# Patient Record
Sex: FEMALE | Race: WHITE | NOT HISPANIC OR LATINO | Employment: FULL TIME | URBAN - METROPOLITAN AREA
[De-identification: names, ages, dates, MRNs, and addresses within clinical notes are randomized per-mention and may not be internally consistent; named-entity substitution may affect disease eponyms.]

---

## 2017-03-16 ENCOUNTER — THERAPY VISIT (OUTPATIENT)
Dept: CHIROPRACTIC MEDICINE | Facility: CLINIC | Age: 47
End: 2017-03-16
Payer: COMMERCIAL

## 2017-03-16 DIAGNOSIS — M54.2 CERVICALGIA: Primary | ICD-10-CM

## 2017-03-16 DIAGNOSIS — M99.02 THORACIC SEGMENT DYSFUNCTION: ICD-10-CM

## 2017-03-16 PROCEDURE — 99203 OFFICE O/P NEW LOW 30 MIN: CPT | Mod: 25 | Performed by: CHIROPRACTOR

## 2017-03-16 PROCEDURE — 98941 CHIROPRACT MANJ 3-4 REGIONS: CPT | Mod: AT | Performed by: CHIROPRACTOR

## 2017-03-16 PROCEDURE — 97810 ACUP 1/> WO ESTIM 1ST 15 MIN: CPT | Performed by: CHIROPRACTOR

## 2017-03-16 PROCEDURE — 97110 THERAPEUTIC EXERCISES: CPT | Performed by: CHIROPRACTOR

## 2017-03-16 NOTE — PROGRESS NOTES
Chiropractic Clinic Visit    PCP: Unknown, Provider    Laya Zuleta is a 46 year old female who is seen  as a self referral presenting with chronic on/off neck pain for 5+ years. States 2 weeks ago having an increase in neck pain after exercising and lifting weights . Patient reports that the onset was gradual and does not recall any trauma or injury.  When asked, patient denies:, falling, slipping, bending and reaching or sleeping awkwardly. Patient reports she can have occasional tingling into her hands, pain radiates to the right upper trapezius area. States having frequent mild headaches.  Prior to onset, the patient was able to drive without neck pain for and sit at computer 2 hours with increasing neck. Patient notes that due to symptoms, they can only sit at computer an hour. Laya Zuleta notes   computer work 4/10 painful and prior to this incident it was 1/10.    Injury: No history trauma    Location of Pain: right lower to upper cervical spine, mid to upper thoracic spine at the following level(s) C5 , C6 , T2 , T3  and T4   Duration of Pain: 5+ years, worse over last 2 weeks  Rating of Pain at worst: 6/10  Rating of Pain Currently: 4/10  Symptoms are better with: Rest  Symptoms are worse with: weight lifting, prolonged sitting, computer work  Additional Features: denies UE radicular weakness.       Health History  as reported by the patient:    How does the patient rate their own health:   Good    Current or past medical history:   No red flags identified    Medical allergies  None    Past Traumas/Surgeries  Other:  Appendix, sinus    Family History  This patient has no significant family history    Medications:  None    Occupation:      Primary job tasks:   Computer work and Prolonged sitting    Barriers as home/work:   none    Additional health Issues:     NA      Review of Systems  Musculoskeletal: as above  Remainder of review of systems is negative including  constitutional, CV, pulmonary, GI, Skin and Neurologic except as noted in HPI or medical history.    Past Medical History   Diagnosis Date     Acne 11/29/2010     CARDIOVASCULAR SCREENING; LDL GOAL LESS THAN 160 11/29/2010     Past Surgical History   Procedure Laterality Date     Sinus surgery       1/10     Laparoscopic appendectomy child       1994       Objective  There were no vitals taken for this visit.    GENERAL APPEARANCE: healthy, alert and no distress   GAIT: NORMAL  SKIN: no suspicious lesions or rashes  NEURO: Normal strength and tone, mentation intact and speech normal  PSYCH:  mentation appears normal and affect normal/bright    Laya was asked to complete the Neck Disability Index, today in the office. NDI Disability score: 28%; pain severity scale: 4/10..    Cervical Spine Exam    Range of Motion:         Slight reduction in active and passive ROM forward flexion, extension, lateral rotation, lateral flexion. Neck pain on flexion and right rotation, lateral flexion.    Inspection:         No visible deformity        normal lordotic curvature maintained    Tender:        upper border of trapezius       scalenes    Non-Tender:        remainder of cervical spine area    Muscle strength:       C5 (shoulder abduction) symmetric 5/5       C6 (elbow flexion) symmetric 5/5       C7 (elbow extension) symmetric 5/5       C8 (finger abduction, thumb flexion) symmetric 5/5    Reflexes:        C5 (biceps) symmetric normal       C6 (supinator) symmetric normal       C7 (triceps) symmetric normal    Sensation:       grossly intact througout bilateral upper extremities    Special Tests:       Cervical compression-Neg, foraminal compression right-produced neck pain, left-Neg  Distraction - negative and Singleton Chavez - negative    Lymphatics:        no edema noted in the upper extremities       Segmental spinal dysfunction/restrictions found at:  :  C5 Right rotation restricted  C6 Right rotation restricted  T3 Right  rotation restricted  T4 Right rotation restricted.      The following soft tissue hypotonicities were observed:Traps: ache and stiff, referred pain: no  Sub-occiput: stiff, referred pain: no    Trigger points were found in:Sub-occipital and Traps    Muscle spasm found in:Sub-occipital, T-spine paraspinal and Traps      Radiology:  none    Assessment:    No diagnosis found.    RX ordered/plan of care  Anticipated outcomes  Possible risks and side effects    After discussing the risk and benefits of care, patient consented to treatment    Patient's condition:  Patient had restrictions pre-manipulation and Patient had decreased motion prior to manipulation    Treatment effectiveness:  Post manipulation there is better intersegmental movement and Patient claims to feel looser post manipulation    Plan:    Procedures:    Evaluation and Management:  83293 Moderate level exam 30 min    CMT:  17844 Chiropractic manipulative treatment 1-2 regions performed   Cervical: Diversified, C5 , C6, Supine  Thoracic: Diversified, T4, T5, Prone    Modalities:  97291: Acupuncture:  Points: for neck and upper back pain-patient prone 15 min    Therapeutic procedures:  Stretches - Pictures and instructions for home exercise program as well as in-office session of a minimum of 8 minutes of the stretching was done today.   Wall stretch, pectoralis stretch, cat/cow, scapular retraction    Response to Treatment  Patient tolerated the treatment well today.    Prognosis: Good      Treatment plan and goals:  Goals:  DRIVE: the patient specific goal is to attain pre-inury status of driving for  2 hours comfortably  USING A COMPUTER: the patient specific goals is to attain his pre-injury status of 2 hours    Frequency of care  Duration of care is estimated to be 6 weeks, from the initial treatment.  It is estimated that the patient will need a total of 3-5 visits to resolve this episode.  For the initial therapeutic trial of care, the frequency is  recommended at 2-3 X a month, once daily.  A reevaluation would be clinically appropriate in 6 visits, to determine progress and further course of care.    In-Office Treatment  Evaluation  Spinal Chiropractic Manipulative Therapy:  C5, C6, T4, T5  Acupuncture:  For chronic neck and upper to mid-back pain-patient prone-gown, 15 min prone.  Therapeutic Exercises: cat/cow, pectoralis stretch, wall-posture stretch, scapular retraction      Recommendations:    Instructions:mindful of good posture    Follow-up:  Return to care in 1-2 weeks.     Disclaimer: This note consists of symbols derived from keyboarding, dictation and/or voice recognition software. As a result, there may be errors in the script that have gone undetected. Please consider this when interpreting information found in this chart.

## 2017-03-31 ENCOUNTER — THERAPY VISIT (OUTPATIENT)
Dept: CHIROPRACTIC MEDICINE | Facility: CLINIC | Age: 47
End: 2017-03-31
Payer: COMMERCIAL

## 2017-03-31 DIAGNOSIS — M99.02 THORACIC SEGMENT DYSFUNCTION: ICD-10-CM

## 2017-03-31 DIAGNOSIS — M99.03 SOMATIC DYSFUNCTION OF LUMBAR REGION: ICD-10-CM

## 2017-03-31 DIAGNOSIS — M54.2 CERVICALGIA: Primary | ICD-10-CM

## 2017-03-31 PROCEDURE — 98941 CHIROPRACT MANJ 3-4 REGIONS: CPT | Mod: AT | Performed by: CHIROPRACTOR

## 2017-03-31 PROCEDURE — 97810 ACUP 1/> WO ESTIM 1ST 15 MIN: CPT | Performed by: CHIROPRACTOR

## 2017-03-31 NOTE — PROGRESS NOTES
Visit #:  2 of 6 based on treatment plan 4-6    Subjective:  Laya Zuleta is a 46 year old female who is seen in f/u up for:     Data Unavailable.     Since last visit on 3/16/2017,  Laya Zuleta reports the following changes: Pain immediately after last treatment: 4/10 and their pain level today 4/10. Sitting rated at a 4/10 painful, difficult and prior to initial visit 5/10 and prior to this incident it was 0/10. Patient returned from traveling and states sleeping in a different bed and the flight has made her more stiff/achy in the last 1-2 days. Lower back is achy/sore today after her flight.     Area of chief complaint:  Cervical and Thoracic :  Symptoms are graded at 4/10. The quality is described as stiff, achey, dull.  Motion has remained about the same, no improvement. Patient feels that they have not improved and feel the same.     Since last visit the patient feels that they are 0-10 percent  improved from last visit.       Objective:  The following was observed:    P: pain elicited on palpation, C1, C6, T5    A: static palpation demonstrates intersegmental asymmetry , C0, C1, C6, T4    R: motion palpation notes restricted motion    T: muscle spasm at level(s): Lumbar erector spine, Sub-occipital, T-spine paraspinal and Traps Bilaterally      Assessment:    Segmental spinal dysfunction/restrictions found at:  Occiput  C1   C5   C6   T5  T6  L5  Sacrum    Diagnoses:    No diagnosis found.    Patient's condition:  Patient had restrictions pre-manipulation and Patient had decreased motion prior to manipulation    Treatment effectiveness:  Post manipulation there is better intersegmental movement and Patient claims to feel looser post manipulation      Procedures:  CMT:  84428 Chiropractic manipulative treatment 3-4 regions performed   Cervical: Diversified, C1 , C6, Supine  Thoracic: Diversified, T5, Prone  Lumbar: Diversified, L1, L2, Prone    Modalities:  15301: Acupuncture, for 15 minutes:   Points: for neck pain, upper back, and lower back pts-patient prone15 min    Therapeutic procedures:  None      Prognosis: Good    Progress towards Goals: Patient has not made progress towards goal of DRIVE: the patient specific goal is to attain pre-inury status of driving for  2 hours comfortably  USING A COMPUTER: the patient specific goals is to attain his pre-injury status of 2 hours.     Response to Treatment:   Patient tolerated the treatment well today.      Recommendations:    Instructions:ice 20 minutes every other hour as needed    Follow-up:  Return to care in 1-2 weeks.

## 2017-04-21 ENCOUNTER — OFFICE VISIT (OUTPATIENT)
Dept: PEDIATRICS | Facility: CLINIC | Age: 47
End: 2017-04-21
Payer: COMMERCIAL

## 2017-04-21 VITALS
OXYGEN SATURATION: 98 % | TEMPERATURE: 98.6 F | SYSTOLIC BLOOD PRESSURE: 120 MMHG | DIASTOLIC BLOOD PRESSURE: 68 MMHG | HEIGHT: 68 IN | HEART RATE: 72 BPM | WEIGHT: 181.1 LBS | BODY MASS INDEX: 27.45 KG/M2

## 2017-04-21 DIAGNOSIS — M25.522 LEFT ELBOW PAIN: ICD-10-CM

## 2017-04-21 DIAGNOSIS — Z01.818 PREOP GENERAL PHYSICAL EXAM: Primary | ICD-10-CM

## 2017-04-21 DIAGNOSIS — Z23 NEED FOR VACCINATION: ICD-10-CM

## 2017-04-21 LAB — BETA HCG QUAL IFA URINE: NEGATIVE

## 2017-04-21 PROCEDURE — 99214 OFFICE O/P EST MOD 30 MIN: CPT | Mod: 25 | Performed by: NURSE PRACTITIONER

## 2017-04-21 PROCEDURE — 90715 TDAP VACCINE 7 YRS/> IM: CPT | Performed by: NURSE PRACTITIONER

## 2017-04-21 PROCEDURE — 84703 CHORIONIC GONADOTROPIN ASSAY: CPT | Performed by: NURSE PRACTITIONER

## 2017-04-21 PROCEDURE — 90471 IMMUNIZATION ADMIN: CPT | Performed by: NURSE PRACTITIONER

## 2017-04-21 NOTE — MR AVS SNAPSHOT
After Visit Summary   4/21/2017    Laya Zuleta    MRN: [id number]           Patient Information     Date Of Birth          1970        Visit Information        Provider Department      4/21/2017 10:00 AM Suzanna Smith APRN CNP [facility]        Today's Diagnoses     Preop general physical exam    -  1    Need for vaccination        Left elbow pain          Care Instructions      Before Your Surgery      Call your surgeon if there is any change in your health. This includes signs of a cold or flu (such as a sore throat, runny nose, cough, rash or fever).    Do not smoke, drink alcohol or take over the counter medicine (unless your surgeon or primary care doctor tells you to) for the 24 hours before and after surgery.    If you take prescribed drugs: Follow your doctor s orders about which medicines to take and which to stop until after surgery.    Eating and drinking prior to surgery: follow the instructions from your surgeon    Take a shower or bath the night before surgery. Use the soap your surgeon gave you to gently clean your skin. If you do not have soap from your surgeon, use your regular soap. Do not shave or scrub the surgery site.  Wear clean pajamas and have clean sheets on your bed.         Follow-ups after your visit        Who to contact     If you have questions or need follow up information about today's clinic visit or your schedule please contact [facility] directly at [phone].  Normal or non-critical lab and imaging results will be communicated to you by MyChart, letter or phone within 4 business days after the clinic has received the results. If you do not hear from us within 7 days, please contact the clinic through MyChart or phone. If you have a critical or abnormal lab result, we will notify you by phone as soon as possible.  Submit refill requests through [employer] or call your pharmacy and they will forward the refill request to  "us. Please allow 3 business days for your refill to be completed.          Additional Information About Your Visit        [employer] Information     [employer] gives you secure access to your electronic health record. If you see a primary care provider, you can also send messages to your care team and make appointments. If you have questions, please call your primary care clinic.  If you do not have a primary care provider, please call [phone] and they will assist you.        Care EveryWhere ID     This is your Care EveryWhere ID. This could be used by other organizations to access your [facility] medical records  [id number]        Your Vitals Were     Pulse Temperature Height Pulse Oximetry BMI (Body Mass Index)       72 98.6  F (37  C) (Tympanic) 5' 7.75\" (1.721 m) 98% 27.74 kg/m2        Blood Pressure from Last 3 Encounters:   04/21/17 120/68   06/23/16 108/80   02/17/16 112/72    Weight from Last 3 Encounters:   04/21/17 181 lb 1.6 oz (82.1 kg)   06/23/16 173 lb 6.4 oz (78.7 kg)   02/17/16 180 lb 9.6 oz (81.9 kg)              We Performed the Following     ADMIN 1st VACCINE     Beta HCG qual IFA urine     TDAP VACCINE (ADACEL)          Today's Medication Changes          These changes are accurate as of: 4/21/17 11:35 AM.  If you have any questions, ask your nurse or doctor.               Start taking these medicines.        Dose/Directions    order for DME   Used for:  Left elbow pain   Started by:  Suzanna Smith APRN CNP        Equipment being ordered: tennis elbow brace   Quantity:  1 each   Refills:  0            Where to get your medicines      Some of these will need a paper prescription and others can be bought over the counter.  Ask your nurse if you have questions.     Bring a paper prescription for each of these medications     order for DME                Primary Care Provider Office Phone # Fax #    FLORENCE Zhu [phone]       [facility] [address] [place] [address]        Thank you!     Thank you for choosing [facility]  for your care. Our goal is always to provide you with excellent care. Hearing back from our patients is one way we can continue to improve our services. Please take a few minutes to complete the written survey that you may receive in the mail after your visit with us. Thank you!             Your Updated Medication List - Protect others around you: Learn how to safely use, store and throw away your medicines at www.disposemymeds.org.          This list is accurate as of: 4/21/17 11:35 AM.  Always use your most recent med list.                   Brand Name Dispense Instructions for use    buPROPion 150 MG 24 hr tablet    WELLBUTRIN XL    90 tablet    Take 1 tablet (150 mg) by mouth every morning       calcium 600 + D 600-400 MG-UNIT per tablet   Generic drug:  calcium-vitamin D     100 tablet    Take 1 tablet by mouth 2 times daily.       citalopram 10 MG tablet    celeXA    90 tablet    Take 1 tablet (10 mg) by mouth daily       ibuprofen 400 MG tablet    ADVIL/MOTRIN    120 tablet    Take 800 mg by mouth every 6 hours as needed for moderate pain Reported on 4/21/2017       order for DME     1 each    Equipment being ordered: tennis elbow brace       SPIRONOLACTONE PO      Take 75 mg by mouth

## 2017-04-21 NOTE — NURSING NOTE
"Chief Complaint   Patient presents with     Pre-Op Exam     cataract surg       Initial /68 (Cuff Size: Adult Regular)  Pulse 72  Temp 98.6  F (37  C) (Tympanic)  Ht 5' 7.75\" (1.721 m)  Wt 181 lb 1.6 oz (82.1 kg)  SpO2 98%  BMI 27.74 kg/m2 Estimated body mass index is 27.74 kg/(m^2) as calculated from the following:    Height as of this encounter: 5' 7.75\" (1.721 m).    Weight as of this encounter: 181 lb 1.6 oz (82.1 kg).  Medication Reconciliation: complete   Alexa Schwarz CMA    "

## 2017-04-21 NOTE — PROGRESS NOTES
[facility]  [address]  [address]  [place] [address] [phone]  Dept: [phone]    PRE-OP EVALUATION:  Today's date: 2017    Laya Zuleta (: 1970) presents for pre-operative evaluation assessment as requested by Dr. Tonio Del Rio.  She requires evaluation and anesthesia risk assessment prior to undergoing surgery/procedure for treatment of cataracts .  Proposed procedure: cataract removal -right.    Date of Surgery/ Procedure: 17  Time of Surgery/ Procedure: [facility]  Hospital/Surgical Facility: [facility]   Fax number for surgical facility: [phone]  Primary Physician: Suzanna Smith  Type of Anesthesia Anticipated: to be determined    Patient has a Health Care Directive or Living Will:  YES - not on file with [facility]    Preop Questions 2017   1.  Do you have a history of heart attack, stroke, stent, bypass or surgery on an artery in the head, neck, heart or legs? No   2.  Do you ever have any pain or discomfort in your chest? No   3.  Do you have a history of  Heart Failure? No   4.   Are you troubled by shortness of breath when:  walking on a level surface, or up a slight hill, or at night? No   5.  Do you currently have a cold, bronchitis or other respiratory infection? No   6.  Do you have a cough, shortness of breath, or wheezing? No   7.  Do you sometimes get pains in the calves of your legs when you walk? No   8. Do you or anyone in your family have previous history of blood clots? No   9.  Do you or does anyone in your family have a serious bleeding problem such as prolonged bleeding following surgeries or cuts? No   10. Have you ever had problems with anemia or been told to take iron pills? No   11. Have you had any abnormal blood loss such as black, tarry or bloody stools, or abnormal vaginal bleeding? No   12. Have you ever had a blood transfusion? No   13. Have you or any of your relatives ever had problems  with anesthesia? No   14. Do you have sleep apnea, excessive snoring or daytime drowsiness? No   15. Do you have any prosthetic heart valves? No   16. Do you have prosthetic joints? No   17. Is there any chance that you may be pregnant? No         HPI:                                                      Brief HPI related to upcoming procedure: Cataract    Also reports unknown duration of discomfort to left elbow. Has been doing exercises at home that she researched on the internet with mild improvement. Believes it is related to overuse secondary to consistent left-handed computer mouse use. Patient is a  and uses computer mouse all day with left hand. She trained herself to use mouse left-handed because of carpal tunnel in her right extremity.     See problem list for active medical problems.  Problems all longstanding and stable, except as noted/documented.  See ROS for pertinent symptoms related to these conditions.                                                                                                  .    MEDICAL HISTORY:                                                      Patient Active Problem List    Diagnosis Date Noted     Family history of breast cancer 04/03/2015     Priority: Medium     CARDIOVASCULAR SCREENING; LDL GOAL LESS THAN 160 11/29/2010     Priority: Medium     Chronic maxillary sinusitis 11/29/2010     Priority: Medium     Acne 11/29/2010     Priority: Medium      Past Medical History:   Diagnosis Date     Acne 11/29/2010     CARDIOVASCULAR SCREENING; LDL GOAL LESS THAN 160 11/29/2010     Past Surgical History:   Procedure Laterality Date     LAPAROSCOPIC APPENDECTOMY CHILD      1994     SINUS SURGERY      1/10     Current Outpatient Prescriptions   Medication Sig Dispense Refill     buPROPion (WELLBUTRIN XL) 150 MG 24 hr tablet Take 1 tablet (150 mg) by mouth every morning 90 tablet 3     citalopram (CELEXA) 10 MG tablet Take 1 tablet (10 mg) by mouth daily 90  "tablet 3     SPIRONOLACTONE PO Take 75 mg by mouth       Calcium Carbonate-Vitamin D (CALCIUM 600 + D) 600-400 MG-UNIT tablet Take 1 tablet by mouth 2 times daily. 100 tablet 3     ibuprofen (ADVIL,MOTRIN) 400 MG tablet Take 800 mg by mouth every 6 hours as needed for moderate pain Reported on 4/21/2017 120 tablet      OTC products: None, except as noted above and herbals and vitamins - Biotin    No Known Allergies   Latex Allergy: NO    Social History   Substance Use Topics     Smoking status: Never Smoker     Smokeless tobacco: Never Used     Alcohol use Yes      Comment: 2 glasses of wine weekly     History   Drug Use No       REVIEW OF SYSTEMS:                                                    C: NEGATIVE for fever, chills, change in weight  I: NEGATIVE for worrisome rashes, moles or lesions  E: NEGATIVE for vision changes or irritation other than affected eye.  E/M: NEGATIVE mouth and throat problems. Recent fullness and itchiness felt in right ear.  R: NEGATIVE for significant cough or SOB  CV: NEGATIVE for chest pain, palpitations or peripheral edema  GI: NEGATIVE for nausea, abdominal pain, heartburn, or change in bowel habits  : NEGATIVE for frequency, dysuria, or hematuria  M: NEGATIVE for significant arthralgias or myalgia  N: NEGATIVE for weakness, dizziness or paresthesias  E: NEGATIVE for temperature intolerance, skin/hair changes  H: NEGATIVE for bleeding problems  P: NEGATIVE for changes in mood or affect    EXAM:                                                    /68 (Cuff Size: Adult Regular)  Pulse 72  Temp 98.6  F (37  C) (Tympanic)  Ht 5' 7.75\" (1.721 m)  Wt 181 lb 1.6 oz (82.1 kg)  SpO2 98%  BMI 27.74 kg/m2  GENERAL APPEARANCE: healthy, alert and no distress  HENT: ear canals and TM's normal and nose and mouth without ulcers or lesions  RESP: lungs clear to auscultation - no rales, rhonchi or wheezes  CV: regular rate and rhythm, normal S1 S2, no S3 or S4 and no murmur, click or " rub   ABDOMEN: soft, nontender, no HSM or masses and bowel sounds normal  MSK: full ROM noted to left elbow. Tenderness on palpation to left lateral epicondyle and extensor carpi radialis brevis tendon.   NEURO: Normal strength and tone, sensory exam grossly normal, mentation intact and speech normal    DIAGNOSTICS:                                                    No labs or EKG required for low risk surgery (cataract, skin procedure, breast biopsy, etc)    Recent Labs   Lab Test  02/04/16   1118  05/13/13   0916  01/15/13   1500   HGB  14.8   --    --    POTASSIUM   --   4.4  4.1        IMPRESSION:                                                    Reason for surgery/procedure: Cataract    The proposed surgical procedure is considered LOW risk.    REVISED CARDIAC RISK INDEX  The patient has the following serious cardiovascular risks for perioperative complications such as (MI, PE, VFib and 3  AV Block):  No serious cardiac risks    The patient has the following additional risks for perioperative complications:  No identified additional risks      ICD-10-CM    1. Preop general physical exam Z01.818    2. Need for vaccination Z23 TDAP VACCINE (ADACEL)     ADMIN 1st VACCINE       RECOMMENDATIONS:                                                      (Z01.818) Preop general physical exam  (primary encounter diagnosis)  Comment: Patient is to take all scheduled medications on the day of surgery EXCEPT for modifications listed below. APPROVAL GIVEN to proceed with proposed procedure, without further diagnostic evaluation  Plan:        - Beta HCG qual IFA urine, negative            (Z23) Need for vaccination  Comment: routine  Plan:        - TDAP VACCINE (ADACEL), ADMIN 1st VACCINE         (M25.522) Left elbow pain  Comment: reports ongoing left elbow pain with an unknown duration. This is likely a result of overuse from consistently using computer mouse with left hand as . Has tried home exercises  found on internet with mild effect. Consider history and physical exam, I suspect this is tennis elbow and recommend wearing a brace consistently for a few weeks. If no improvement, will consider referral to PT. Educated patient on some helpful home exercises and strengthening she can try.  Plan:        - order for DME       - Strengthening exercises at home.       - Wear brace 10 hrs/day, may remove at night.    Follow-up: return to clinic if symptoms fail to improve or worsen with above treatment plan.     Su De La O RN, FNP-DNP Student  [facility]       Signed Electronically by: FLORENCE Zhu CNP    Copy of this evaluation report is provided to requesting physician.    [facility] Preop Guidelines

## 2017-06-14 DIAGNOSIS — F32.81 PMDD (PREMENSTRUAL DYSPHORIC DISORDER): ICD-10-CM

## 2017-06-14 NOTE — TELEPHONE ENCOUNTER
CITALOPRAM 10MG     Last Written Prescription Date: 6/23/2016  Last Fill Quantity: 90, # refills: 3  Last Office Visit with G primary care provider:  4/21/2017        Last PHQ-9 score on record= No flowsheet data found.

## 2017-06-16 RX ORDER — CITALOPRAM HYDROBROMIDE 10 MG/1
10 TABLET ORAL DAILY
Qty: 90 TABLET | Refills: 0 | Status: SHIPPED | OUTPATIENT
Start: 2017-06-16 | End: 2017-07-13

## 2017-06-16 NOTE — TELEPHONE ENCOUNTER
Medication is being filled for 1 time refill only due to:  Patient needs to be seen because due for annual physical.       Prescription approved per [facility] Refill Protocol.    Lary MARQUEZ RN, BSN, PHN  [facility] RN

## 2017-06-16 NOTE — TELEPHONE ENCOUNTER
LM on VM to call back. Needs to schedule for annual physical.     Lary MARQUEZ RN, BSN, PHN  [facility] RN

## 2017-07-03 DIAGNOSIS — F32.81 PMDD (PREMENSTRUAL DYSPHORIC DISORDER): ICD-10-CM

## 2017-07-03 NOTE — TELEPHONE ENCOUNTER
buPROPion (WELLBUTRIN XL) 150 MG 24 hr tablet       Last Written Prescription Date: 6/23/2016  Last Fill Quantity: 90; # refills: 3  Last Office Visit with [facility], [facility] or [facility] prescribing provider:  4/21/2017   Next 5 appointments (look out 90 days)     Jul 13, 2017 11:20 AM CDT   PHYSICAL with FLORENCE Zhu CNP   [facility] ([facility]    [address] [place] [address]  [address] [place] [id number] [phone]                   Last PHQ-9 score on record= No flowsheet data found.    No results found for: AST  No results found for: ALT

## 2017-07-05 RX ORDER — BUPROPION HYDROCHLORIDE 150 MG/1
150 TABLET ORAL EVERY MORNING
Qty: 30 TABLET | Refills: 0 | Status: SHIPPED | OUTPATIENT
Start: 2017-07-05 | End: 2017-07-13

## 2017-07-05 NOTE — TELEPHONE ENCOUNTER
Routing refill request to provider for review/approval because:  R/t diagnosis and no phq9 on file

## 2017-07-13 ENCOUNTER — OFFICE VISIT (OUTPATIENT)
Dept: PEDIATRICS | Facility: CLINIC | Age: 47
End: 2017-07-13
Payer: COMMERCIAL

## 2017-07-13 VITALS
DIASTOLIC BLOOD PRESSURE: 62 MMHG | TEMPERATURE: 97.6 F | SYSTOLIC BLOOD PRESSURE: 102 MMHG | BODY MASS INDEX: 27.89 KG/M2 | OXYGEN SATURATION: 99 % | HEIGHT: 68 IN | HEART RATE: 79 BPM | WEIGHT: 184 LBS

## 2017-07-13 DIAGNOSIS — Z00.00 ROUTINE HEALTH MAINTENANCE: Primary | ICD-10-CM

## 2017-07-13 DIAGNOSIS — F32.81 PMDD (PREMENSTRUAL DYSPHORIC DISORDER): ICD-10-CM

## 2017-07-13 PROCEDURE — 99396 PREV VISIT EST AGE 40-64: CPT | Performed by: NURSE PRACTITIONER

## 2017-07-13 PROCEDURE — G0145 SCR C/V CYTO,THINLAYER,RESCR: HCPCS | Performed by: NURSE PRACTITIONER

## 2017-07-13 PROCEDURE — 87624 HPV HI-RISK TYP POOLED RSLT: CPT | Performed by: NURSE PRACTITIONER

## 2017-07-13 RX ORDER — BUPROPION HYDROCHLORIDE 150 MG/1
150 TABLET ORAL EVERY MORNING
Qty: 90 TABLET | Refills: 3 | Status: SHIPPED | OUTPATIENT
Start: 2017-07-13 | End: 2018-08-28

## 2017-07-13 RX ORDER — CITALOPRAM HYDROBROMIDE 10 MG/1
10 TABLET ORAL DAILY
Qty: 90 TABLET | Refills: 3 | Status: SHIPPED | OUTPATIENT
Start: 2017-07-13 | End: 2018-08-25

## 2017-07-13 NOTE — NURSING NOTE
"Chief Complaint   Patient presents with     Physical       Initial /62 (Cuff Size: Adult Large)  Pulse 79  Temp 97.6  F (36.4  C) (Tympanic)  Ht 5' 7.75\" (1.721 m)  Wt 184 lb (83.5 kg)  SpO2 99%  BMI 28.18 kg/m2 Estimated body mass index is 28.18 kg/(m^2) as calculated from the following:    Height as of this encounter: 5' 7.75\" (1.721 m).    Weight as of this encounter: 184 lb (83.5 kg).  Medication Reconciliation: complete   Alexa Schwarz CMA    "

## 2017-07-13 NOTE — PATIENT INSTRUCTIONS
-Mammogram  -Fasting lab appointment.         Preventive Health Recommendations  Female Ages 40 to 49    Yearly exam:     See your health care provider every year in order to  1. Review health changes.   2. Discuss preventive care.    3. Review your medicines if your doctor prescribed any.      Get a Pap test every three years (unless you have an abnormal result and your provider advises testing more often).      If you get Pap tests with HPV test, you only need to test every 5 years, unless you have an abnormal result. You do not need a Pap test if your uterus was removed (hysterectomy) and you have not had cancer.      You should be tested each year for STDs (sexually transmitted diseases), if you're at risk.       Ask your doctor if you should have a mammogram.      Have a colonoscopy (test for colon cancer) if someone in your family has had colon cancer or polyps before age 50.       Have a cholesterol test every 5 years.       Have a diabetes test (fasting glucose) after age 45. If you are at risk for diabetes, you should have this test every 3 years.    Shots: Get a flu shot each year. Get a tetanus shot every 10 years.     Nutrition:     Eat at least 5 servings of fruits and vegetables each day.    Eat whole-grain bread, whole-wheat pasta and brown rice instead of white grains and rice.    Talk to your provider about Calcium and Vitamin D.     Lifestyle    Exercise at least 150 minutes a week (an average of 30 minutes a day, 5 days a week). This will help you control your weight and prevent disease.    Limit alcohol to one drink per day.    No smoking.     Wear sunscreen to prevent skin cancer.    See your dentist every six months for an exam and cleaning.

## 2017-07-13 NOTE — MR AVS SNAPSHOT
After Visit Summary   7/13/2017    Laya Zuleta    MRN: [id number]           Patient Information     Date Of Birth          1970        Visit Information        Provider Department      7/13/2017 11:20 AM Suzanna Smith APRN [facility] [place]        Today's Diagnoses     Routine health maintenance    -  1    PMDD (premenstrual dysphoric disorder)          Care Instructions    -Mammogram  -Fasting lab appointment.         Preventive Health Recommendations  Female Ages 40 to 49    Yearly exam:     See your health care provider every year in order to  1. Review health changes.   2. Discuss preventive care.    3. Review your medicines if your doctor prescribed any.      Get a Pap test every three years (unless you have an abnormal result and your provider advises testing more often).      If you get Pap tests with HPV test, you only need to test every 5 years, unless you have an abnormal result. You do not need a Pap test if your uterus was removed (hysterectomy) and you have not had cancer.      You should be tested each year for STDs (sexually transmitted diseases), if you're at risk.       Ask your doctor if you should have a mammogram.      Have a colonoscopy (test for colon cancer) if someone in your family has had colon cancer or polyps before age 50.       Have a cholesterol test every 5 years.       Have a diabetes test (fasting glucose) after age 45. If you are at risk for diabetes, you should have this test every 3 years.    Shots: Get a flu shot each year. Get a tetanus shot every 10 years.     Nutrition:     Eat at least 5 servings of fruits and vegetables each day.    Eat whole-grain bread, whole-wheat pasta and brown rice instead of white grains and rice.    Talk to your provider about Calcium and Vitamin D.     Lifestyle    Exercise at least 150 minutes a week (an average of 30 minutes a day, 5 days a week). This will help you control your weight and prevent  "disease.    Limit alcohol to one drink per day.    No smoking.     Wear sunscreen to prevent skin cancer.    See your dentist every six months for an exam and cleaning.          Follow-ups after your visit        Future tests that were ordered for you today     Open Future Orders        Priority Expected Expires Ordered    *MA Screening Digital Bilateral Routine  7/13/2018 7/13/2017    GLUCOSE Routine  7/13/2018 7/13/2017    Lipid panel reflex to direct LDL Routine  7/13/2018 7/13/2017            Who to contact     If you have questions or need follow up information about today's clinic visit or your schedule please contact [facility] directly at [phone].  Normal or non-critical lab and imaging results will be communicated to you by Casual Stepshart, letter or phone within 4 business days after the clinic has received the results. If you do not hear from us within 7 days, please contact the clinic through Zave Networkst or phone. If you have a critical or abnormal lab result, we will notify you by phone as soon as possible.  Submit refill requests through GroundMetrics or call your pharmacy and they will forward the refill request to us. Please allow 3 business days for your refill to be completed.          Additional Information About Your Visit        Casual StepsharMeeVee Information     GroundMetrics gives you secure access to your electronic health record. If you see a primary care provider, you can also send messages to your care team and make appointments. If you have questions, please call your primary care clinic.  If you do not have a primary care provider, please call [phone] and they will assist you.        Care EveryWhere ID     This is your Care EveryWhere ID. This could be used by other organizations to access your [facility] medical records  [id number]        Your Vitals Were     Pulse Temperature Height Pulse Oximetry BMI (Body Mass Index)       79 97.6  F (36.4  C) (Tympanic) 5' 7.75\" (1.721 m) 99% 28.18 kg/m2        " Blood Pressure from Last 3 Encounters:   07/13/17 102/62   04/21/17 120/68   06/23/16 108/80    Weight from Last 3 Encounters:   07/13/17 184 lb (83.5 kg)   04/21/17 181 lb 1.6 oz (82.1 kg)   06/23/16 173 lb 6.4 oz (78.7 kg)              We Performed the Following     HPV High Risk Types DNA Cervical     Pap imaged thin layer screen with HPV - recommended age 30 - 65          Where to get your medicines      These medications were sent to [facility] - [place], [place] - [address]  [address], [facility] [address]     Phone:  [phone]     buPROPion 150 MG 24 hr tablet    citalopram 10 MG tablet          Primary Care Provider Office Phone # Fax #    FLORENCE Zhu [facility] [phone]       [facility] [address] [place] [address] [address] [place] [address]        Equal Access to Services     RICH SPENCER : Hadii ashley ku hadasho Soomaali, waaxda luqadaha, qaybta kaalmada adeegyada, waxay beccain haycecilia aburto . So [facility] [phone].    ATENCIÓN: Si habla español, tiene a chambers disposición servicios gratuitos de asistencia lingüística. Llame al [phone].    We comply with applicable federal civil rights laws and [place] laws. We do not discriminate on the basis of race, color, national origin, age, disability sex, sexual orientation or gender identity.            Thank you!     Thank you for choosing [facility]  for your care. Our goal is always to provide you with excellent care. Hearing back from our patients is one way we can continue to improve our services. Please take a few minutes to complete the written survey that you may receive in the mail after your visit with us. Thank you!             Your Updated Medication List - Protect others around you: Learn how to safely use, store and throw away your medicines at www.disposemymeds.org.          This list is accurate as of: 7/13/17 11:49 AM.  Always use your most recent med  list.                   Brand Name Dispense Instructions for use Diagnosis    buPROPion 150 MG 24 hr tablet    WELLBUTRIN XL    90 tablet    Take 1 tablet (150 mg) by mouth every morning    PMDD (premenstrual dysphoric disorder)       calcium 600 + D 600-400 MG-UNIT per tablet   Generic drug:  calcium-vitamin D     100 tablet    Take 1 tablet by mouth 2 times daily.        citalopram 10 MG tablet    celeXA    90 tablet    Take 1 tablet (10 mg) by mouth daily    PMDD (premenstrual dysphoric disorder)       ibuprofen 400 MG tablet    ADVIL/MOTRIN    120 tablet    Take 800 mg by mouth every 6 hours as needed for moderate pain Reported on 4/21/2017        order for DME     1 each    Equipment being ordered: tennis elbow brace    Left elbow pain       SPIRONOLACTONE PO      Take 75 mg by mouth

## 2017-07-13 NOTE — PROGRESS NOTES
SUBJECTIVE:   CC: Laya Zuleta is an 47 year old woman who presents for preventive health visit.     Physical   Annual:     Getting at least 3 servings of Calcium per day::  Yes    Bi-annual eye exam::  Yes    Dental care twice a year::  Yes    Sleep apnea or symptoms of sleep apnea::  None    Diet::  Regular (no restrictions)    Frequency of exercise::  2-3 days/week    Duration of exercise::  30-45 minutes    Taking medications regularly::  Yes    Medication side effects::  None    Additional concerns today::  No    Concerns today: med review of Celexa, Wellbutrin  Has not had a period since last summer    -------------------------------------    Today's PHQ-2 Score:   PHQ-2 ( 1999 Pfizer) 7/13/2017   Q1: Little interest or pleasure in doing things Not at all   Q2: Feeling down, depressed or hopeless Not at all   PHQ-2 Score 0       Abuse: Current or Past(Physical, Sexual or Emotional)- No  Do you feel safe in your environment - Yes    Social History   Substance Use Topics     Smoking status: Never Smoker     Smokeless tobacco: Never Used     Alcohol use Yes      Comment: 2 times per week, 2 per time     The patient does not drink >3 drinks per day nor >7 drinks per week.    Reviewed orders with patient.  Reviewed health maintenance and updated orders accordingly - Yes  Labs reviewed in EPIC    Patient under age 50, mutual decision reflected in health maintenance.      Pertinent mammograms are reviewed under the imaging tab.  History of abnormal Pap smear: NO - age 30-65 PAP every 5 years with negative HPV co-testing recommended    Reviewed and updated as needed this visit by clinical staff  Tobacco  Allergies  Meds  Med Hx  Surg Hx  Fam Hx  Soc Hx        Reviewed and updated as needed this visit by Provider            ROS:  C: NEGATIVE for fever, chills, change in weight  I: NEGATIVE for worrisome rashes, moles or lesions  E: NEGATIVE for vision changes or irritation  ENT: NEGATIVE for ear, mouth  "and throat problems  R: NEGATIVE for significant cough or SOB  B: NEGATIVE for masses, tenderness or discharge  CV: NEGATIVE for chest pain, palpitations or peripheral edema  GI: NEGATIVE for nausea, abdominal pain, heartburn, or change in bowel habits   female: Periods are irregular. No period since last August.   M: NEGATIVE for significant arthralgias or myalgia  N: NEGATIVE for weakness, dizziness or paresthesias  P: NEGATIVE for changes in mood or affect     OBJECTIVE:   /62 (Cuff Size: Adult Large)  Pulse 79  Temp 97.6  F (36.4  C) (Tympanic)  Ht 5' 7.75\" (1.721 m)  Wt 184 lb (83.5 kg)  SpO2 99%  BMI 28.18 kg/m2  EXAM:  GENERAL APPEARANCE: healthy, alert and no distress  EYES: Eyes grossly normal to inspection, PERRL and conjunctivae and sclerae normal  HENT: ear canals and TM's normal, nose and mouth without ulcers or lesions, oropharynx clear and oral mucous membranes moist  NECK: no adenopathy, no asymmetry, masses, or scars and thyroid normal to palpation  RESP: lungs clear to auscultation - no rales, rhonchi or wheezes  BREAST: normal without masses, tenderness or nipple discharge and no palpable axillary masses or adenopathy  CV: regular rate and rhythm, normal S1 S2, no S3 or S4, no murmur, click or rub, no peripheral edema and peripheral pulses strong  ABDOMEN: soft, nontender, no hepatosplenomegaly, no masses and bowel sounds normal   (female): normal female external genitalia, normal urethral meatus, vaginal mucosal atrophy noted, normal cervix, adnexae, and uterus without masses or abnormal discharge  MS: no musculoskeletal defects are noted and gait is age appropriate without ataxia  SKIN: no suspicious lesions or rashes  NEURO: Normal strength and tone, sensory exam grossly normal, mentation intact and speech normal  PSYCH: mentation appears normal and affect normal/bright    ASSESSMENT/PLAN:   1. Routine health maintenance  - GLUCOSE; Future  - Pap imaged thin layer screen with " "HPV - recommended age 30 - 65  - HPV High Risk Types DNA Cervical  - Lipid panel reflex to direct LDL; Future  - *MA Screening Digital Bilateral; Future    2. PMDD (premenstrual dysphoric disorder)  - buPROPion (WELLBUTRIN XL) 150 MG 24 hr tablet; Take 1 tablet (150 mg) by mouth every morning  Dispense: 90 tablet; Refill: 3  - citalopram (CELEXA) 10 MG tablet; Take 1 tablet (10 mg) by mouth daily  Dispense: 90 tablet; Refill: 3    COUNSELING:  Reviewed preventive health counseling, as reflected in patient instructions       reports that she has never smoked. She has never used smokeless tobacco.    Estimated body mass index is 28.18 kg/(m^2) as calculated from the following:    Height as of this encounter: 5' 7.75\" (1.721 m).    Weight as of this encounter: 184 lb (83.5 kg).   Weight management plan: Discussed healthy diet and exercise guidelines and patient will follow up in 12 months in clinic to re-evaluate.    Counseling Resources:  ATP IV Guidelines  Pooled Cohorts Equation Calculator  Breast Cancer Risk Calculator  FRAX Risk Assessment  ICSI Preventive Guidelines  Dietary Guidelines for Americans, 2010  USDA's MyPlate  ASA Prophylaxis  Lung CA Screening    FLORENCE Zhu [facility]  "

## 2017-07-18 LAB
COPATH REPORT: NORMAL
PAP: NORMAL

## 2017-07-20 LAB
FINAL DIAGNOSIS: NORMAL
HPV HR 12 DNA CVX QL NAA+PROBE: NEGATIVE
HPV16 DNA SPEC QL NAA+PROBE: NEGATIVE
HPV18 DNA SPEC QL NAA+PROBE: NEGATIVE
SPECIMEN DESCRIPTION: NORMAL

## 2017-08-23 ENCOUNTER — RADIANT APPOINTMENT (OUTPATIENT)
Dept: MAMMOGRAPHY | Facility: CLINIC | Age: 47
End: 2017-08-23
Payer: COMMERCIAL

## 2017-08-23 ENCOUNTER — DOCUMENTATION ONLY (OUTPATIENT)
Dept: PEDIATRICS | Facility: CLINIC | Age: 47
End: 2017-08-23

## 2017-08-23 DIAGNOSIS — Z00.00 ROUTINE HEALTH MAINTENANCE: ICD-10-CM

## 2017-08-23 LAB
CHOLEST SERPL-MCNC: 240 MG/DL
GLUCOSE SERPL-MCNC: 91 MG/DL (ref 70–99)
HDLC SERPL-MCNC: 53 MG/DL
LDLC SERPL CALC-MCNC: 150 MG/DL
NONHDLC SERPL-MCNC: 187 MG/DL
TRIGL SERPL-MCNC: 184 MG/DL

## 2017-08-23 PROCEDURE — G0202 SCR MAMMO BI INCL CAD: HCPCS | Mod: TC

## 2017-08-23 PROCEDURE — 77063 BREAST TOMOSYNTHESIS BI: CPT | Mod: TC

## 2017-08-23 PROCEDURE — 80061 LIPID PANEL: CPT | Performed by: NURSE PRACTITIONER

## 2017-08-23 PROCEDURE — 82947 ASSAY GLUCOSE BLOOD QUANT: CPT | Performed by: NURSE PRACTITIONER

## 2017-08-23 PROCEDURE — 36415 COLL VENOUS BLD VENIPUNCTURE: CPT | Performed by: NURSE PRACTITIONER

## 2017-08-23 NOTE — PROGRESS NOTES
Reason for Call:  Form, our goal is to have forms completed with 72 hours, however, some forms may require a visit or additional information.    Type of letter, form or note:  employer    Who is the form from?: Insurance comp    Where did the form come from: Patient or family brought in       What clinic location was the form placed at?: [place]    Where the form was placed: 's Box    What number is listed as a contact on the form?: [phone]       Additional comments: the pt would like it faxed to  [phone]     Call taken on 8/23/2017 at 9:38 AM by Heike Stewart

## 2017-08-24 NOTE — PROGRESS NOTES
Completed form faxed to [facility] [phone].  Called and spoke with patient - notified form was faxed.  Alexa Schwarz, CMA

## 2017-09-25 ENCOUNTER — OFFICE VISIT (OUTPATIENT)
Dept: PEDIATRICS | Facility: CLINIC | Age: 47
End: 2017-09-25
Payer: COMMERCIAL

## 2017-09-25 VITALS
HEIGHT: 68 IN | SYSTOLIC BLOOD PRESSURE: 110 MMHG | OXYGEN SATURATION: 99 % | TEMPERATURE: 98.1 F | BODY MASS INDEX: 28.81 KG/M2 | RESPIRATION RATE: 16 BRPM | WEIGHT: 190.1 LBS | DIASTOLIC BLOOD PRESSURE: 80 MMHG | HEART RATE: 75 BPM

## 2017-09-25 DIAGNOSIS — J01.01 ACUTE RECURRENT MAXILLARY SINUSITIS: ICD-10-CM

## 2017-09-25 DIAGNOSIS — J02.9 ACUTE PHARYNGITIS, UNSPECIFIED ETIOLOGY: Primary | ICD-10-CM

## 2017-09-25 LAB
BACTERIA SPEC CULT: NORMAL
DEPRECATED S PYO AG THROAT QL EIA: NORMAL
SPECIMEN SOURCE: NORMAL
SPECIMEN SOURCE: NORMAL

## 2017-09-25 PROCEDURE — 99213 OFFICE O/P EST LOW 20 MIN: CPT | Performed by: PHYSICIAN ASSISTANT

## 2017-09-25 PROCEDURE — 87081 CULTURE SCREEN ONLY: CPT | Performed by: PHYSICIAN ASSISTANT

## 2017-09-25 PROCEDURE — 87880 STREP A ASSAY W/OPTIC: CPT | Performed by: PHYSICIAN ASSISTANT

## 2017-09-25 NOTE — MR AVS SNAPSHOT
"              After Visit Summary   9/25/2017    Laya Zuleta    MRN: [id number]           Patient Information     Date Of Birth          1970        Visit Information        Provider Department      9/25/2017 1:50 PM Trey Alvarez PA-C [facility]        Today's Diagnoses     Acute pharyngitis, unspecified etiology    -  1    Acute recurrent maxillary sinusitis          Care Instructions    Begin antibiotics   Take with food            Follow-ups after your visit        Who to contact     If you have questions or need follow up information about today's clinic visit or your schedule please contact [facility] at [phone].  Normal or non-critical lab and imaging results will be communicated to you by MyChart, letter or phone within 4 business days after the clinic has received the results. If you do not hear from us within 7 days, please contact the clinic through Kudaromhart or phone. If you have a critical or abnormal lab result, we will notify you by phone as soon as possible.  Submit refill requests through PointsHound or call your pharmacy and they will forward the refill request to us. Please allow 3 business days for your refill to be completed.          Additional Information About Your Visit        MyChart Information     PointsHound gives you secure access to your electronic health record. If you see a primary care provider, you can also send messages to your care team and make appointments. If you have questions, please call your primary care clinic.  If you do not have a primary care provider, please call [phone] and they will assist you.        Care EveryWhere ID     This is your Care EveryWhere ID. This could be used by other organizations to access your [facility] medical records  [id number]        Your Vitals Were     Pulse Temperature Respirations Height Pulse Oximetry BMI (Body Mass Index)    75 98.1  F (36.7  C) (Oral) 16 5' 7.75\" (1.721 m) 99% " 29.12 kg/m2       Blood Pressure from Last 3 Encounters:   09/25/17 110/80   07/13/17 102/62   04/21/17 120/68    Weight from Last 3 Encounters:   09/25/17 190 lb 1.6 oz (86.2 kg)   07/13/17 184 lb (83.5 kg)   04/21/17 181 lb 1.6 oz (82.1 kg)              We Performed the Following     Beta strep group A culture     Strep, Rapid Screen          Today's Medication Changes          These changes are accurate as of: 9/25/17  2:28 PM.  If you have any questions, ask your nurse or doctor.               Start taking these medicines.        Dose/Directions    amoxicillin-clavulanate 875-125 MG per tablet   Commonly known as:  AUGMENTIN   Used for:  Acute recurrent maxillary sinusitis   Started by:  Trey Alvarez PA-C        Dose:  1 tablet   Take 1 tablet by mouth 2 times daily   Quantity:  20 tablet   Refills:  0            Where to get your medicines      These medications were sent to [facility] [place] - [address]   [address], [place] [address]     Phone:  [phone]     amoxicillin-clavulanate 875-125 MG per tablet                Primary Care Provider Office Phone # Fax #    FLORENCE Zhu [facility] [phone]       [facility] [place] [address]        Equal Access to Services     [facility] AH: Hadii aad ku hadasho Soomaali, waaxda luqadaha, qaybta kaalmada adeegyada, walter ansari. So [facility] [phone].    ATENCIÓN: Si habla español, tiene a chambers disposición servicios gratuitos de asistencia lingüística. Zohra al [phone].    We comply with applicable federal civil rights laws and [place] laws. We do not discriminate on the basis of race, color, national origin, age, disability sex, sexual orientation or gender identity.            Thank you!     Thank you for choosing [facility]  for your care. Our goal is always to provide you with excellent care. Hearing  back from our patients is one way we can continue to improve our services. Please take a few minutes to complete the written survey that you may receive in the mail after your visit with us. Thank you!             Your Updated Medication List - Protect others around you: Learn how to safely use, store and throw away your medicines at www.disposemymeds.org.          This list is accurate as of: 9/25/17  2:28 PM.  Always use your most recent med list.                   Brand Name Dispense Instructions for use Diagnosis    amoxicillin-clavulanate 875-125 MG per tablet    AUGMENTIN    20 tablet    Take 1 tablet by mouth 2 times daily    Acute recurrent maxillary sinusitis       buPROPion 150 MG 24 hr tablet    WELLBUTRIN XL    90 tablet    Take 1 tablet (150 mg) by mouth every morning    PMDD (premenstrual dysphoric disorder)       calcium 600 + D 600-400 MG-UNIT per tablet   Generic drug:  calcium-vitamin D     100 tablet    Take 1 tablet by mouth 2 times daily.        citalopram 10 MG tablet    celeXA    90 tablet    Take 1 tablet (10 mg) by mouth daily    PMDD (premenstrual dysphoric disorder)       ibuprofen 400 MG tablet    ADVIL/MOTRIN    120 tablet    Take 800 mg by mouth every 6 hours as needed for moderate pain Reported on 4/21/2017        order for DME     1 each    Equipment being ordered: tennis elbow brace    Left elbow pain       SPIRONOLACTONE PO      Take 75 mg by mouth

## 2017-09-25 NOTE — PROGRESS NOTES
"  SUBJECTIVE:   Laya Zuleta is a 47 year old female who presents to clinic today for the following health issues:    Acute Illness   Acute illness concerns: Sore throat, sinus headache  Onset: x 7 days    Fever: YES- low grade    Chills/Sweats: YES- chills body aches    Headache (location?): YES- sinus    Sinus Pressure:YES--max    PND present    Conjunctivitis:  no    Ear Pain: no    Rhinorrhea: YES    Congestion: YES    Sore Throat: YES     Cough: YES-non-productive    Wheeze: no     Decreased Appetite: no     Nausea: no     Vomiting: no     Diarrhea:  no     Dysuria/Freq.: no     Fatigue/Achiness: YES    Sick/Strep Exposure: YES - kids     Therapies Tried and outcome: dayquil helps with the fever, mucinex and tylenol helps a little bit.  S/p sinus surgery 2011.     ROS:  ROS otherwise negative    OBJECTIVE:                                                    /80 (BP Location: Right arm, Patient Position: Chair, Cuff Size: Adult Regular)  Pulse 75  Temp 98.1  F (36.7  C) (Oral)  Resp 16  Ht 5' 7.75\" (1.721 m)  Wt 190 lb 1.6 oz (86.2 kg)  SpO2 99%  BMI 29.12 kg/m2  Body mass index is 29.12 kg/(m^2).   GENERAL: alert, no distress  HENT: ear canals- normal; TMs- normal; Nose- normal; Mouth- no ulcers, no lesions' max sinus tenderness  NECK: no tenderness, no adenopathy  RESP: lungs clear to auscultation - no rales, no rhonchi, no wheezes  CV: regular rates and rhythm, normal S1 S2, no S3 or S4 and no murmur, no click or rub  ABDOMEN: soft, no tenderness  SKIN: no suspicious lesions, no rashes    Diagnostic test results:  Results for orders placed or performed in visit on 09/25/17 (from the past 24 hour(s))   Strep, Rapid Screen   Result Value Ref Range    Specimen Description Throat     Rapid Strep A Screen       NEGATIVE: No Group A streptococcal antigen detected by immunoassay, await culture report.          ASSESSMENT/PLAN:                                                    (J02.9) Acute " pharyngitis, unspecified etiology  (primary encounter diagnosis)  Comment: TC pending. Continue with symptomatic treatment.   Plan: Strep, Rapid Screen, Beta strep group A culture            (J01.01) Acute recurrent maxillary sinusitis  Comment: begin antibiotics. Increase fluid intake.  Plan: amoxicillin-clavulanate (AUGMENTIN) 875-125 MG         per tablet            See Patient Instructions    Trey Alvarez PA-C  [facility]AN

## 2017-09-25 NOTE — NURSING NOTE
"Chief Complaint   Patient presents with     Pharyngitis       Initial /80 (BP Location: Right arm, Patient Position: Chair, Cuff Size: Adult Regular)  Pulse 75  Temp 98.1  F (36.7  C) (Oral)  Resp 16  Ht 5' 7.75\" (1.721 m)  Wt 190 lb 1.6 oz (86.2 kg)  SpO2 99%  BMI 29.12 kg/m2 Estimated body mass index is 29.12 kg/(m^2) as calculated from the following:    Height as of this encounter: 5' 7.75\" (1.721 m).    Weight as of this encounter: 190 lb 1.6 oz (86.2 kg).  Medication Reconciliation: complete   Allie Hughes CMA (AAMA)    "

## 2018-01-15 ENCOUNTER — VIRTUAL VISIT (OUTPATIENT)
Dept: FAMILY MEDICINE | Facility: OTHER | Age: 48
End: 2018-01-15

## 2018-01-15 NOTE — PROGRESS NOTES
"Date:   Clinician: Ketan De Los Santos  Clinician NPI: [id number]  Patient: Laya Zuleta  Patient : 1970  Patient Address: [address] [place] [id number]  Patient Phone: [phone]  Visit Protocol: URI  Patient Summary:  Laya is a 47 year old ( : 1970 ) female who initiated a Visit for cold, sinus infection, or influenza. When asked the question \"Please sign me up to receive news, health information and promotions from [employer].\", Laya responded \"No\".    Laya states her symptoms started 1-2 days ago.   Her symptoms consist of myalgia, a headache, rhinitis, a sore throat, facial pain or pressure, nasal congestion, enlarged lymph nodes, malaise, chills, and a cough. Laya also feels feverish.   Symptom Details     Nasal secretions: The color of her mucus is green and yellow.    Cough: Laya coughs a few times an hour and her cough is not more bothersome at night. Phlegm comes into her throat when she coughs. She believes the phlegm causes the cough. The color of the phlegm is green.     Sore throat: Laya reports having moderate throat pain, does not have exudate on her tonsils, and is able to swallow liquids. The lymph nodes in her neck are enlarged. She states that rashes have not appeared on the skin since the sore throat started.     Temperature: Her current temperature is 100.6 degrees Fahrenheit. Laya has had a temperature over 100 degrees Fahrenheit for 1-2 days.     Facial pain or pressure: The facial pain or pressure feels worse when bending over or leaning forward.     Headache: She states the headache is moderate.      Laya denies having teeth pain, wheezing, dyspnea, and ear pain. She also denies taking antibiotic medication for the symptoms and having recent facial or sinus surgery in the past 60 days.   Within the past week, Laya has not been exposed to someone with strep throat. She has not recently been exposed to someone " with influenza. Laya has not been in close contact with any high risk individuals.   Laya had 3 sinus infections within the past year.   Weight: 180 lbs   Laya does not smoke or use smokeless tobacco.   She denies pregnancy and denies breastfeeding. She does not menstruate.   MEDICATIONS:  Acetaminophen (Tylenol), ibuprofen (Advil, Motrin), and guaifenesin + dextromethorphan (Robitussin DM, Mytussin DM, Mucinex DM)   Patient free text response: Spirnolactine 75mg for acne  , ALLERGIES:  NKDA   Clinician Response:  Dear Laya,  Based on the information you have provided, you likely have influenza, otherwise known as 'the flu.'  I am prescribing oseltamivir (Tamiflu) 75 mg. Take one tablet by mouth 2 times a day for 5 days. There are no refills with this prescription.   Because the flu is easily spread, other members of your family or close personal contacts should consider taking steps to prevent it. The best way to protect yourself and others from the flu is to get a flu shot before each flu season.  You will feel better faster if you take care of yourself by getting more rest and drinking plenty of liquids, especially water.  Remember to wash your hands often and stay home while you are sick to decrease the chance you will spread your infection to others.  Try the following to help with your throat pain and discomfort:     Use throat lozenges    Gargle with warm salt water (1/4 teaspoon of salt per 8 ounce glass of water)    Suck on frozen items such as popsicles or ice cubes     Call 911 or go to the emergency room if you feel that your throat is closing off, you suddenly develop a rash, you are unable to swallow fluids, you are drooling, or you are having difficulty breathing.  Follow up with your primary care provider if your symptoms are not improving in 3-4 days.   Diagnosis: Influenza  Diagnosis ICD: J11.1  Prescription: oseltamivir (Tamiflu) 75 mg oral tablet 10 tablets, 5 days supply.  Take one tablet by mouth 2 times a day for 5 days. Refills: 0, Refill as needed: no, Allow substitutions: yes  Pharmacy: [id number] - [phone] - [address] [place] [address]

## 2018-08-23 ENCOUNTER — OFFICE VISIT (OUTPATIENT)
Dept: PEDIATRICS | Facility: CLINIC | Age: 48
End: 2018-08-23
Payer: COMMERCIAL

## 2018-08-23 VITALS
OXYGEN SATURATION: 98 % | DIASTOLIC BLOOD PRESSURE: 76 MMHG | HEIGHT: 68 IN | SYSTOLIC BLOOD PRESSURE: 114 MMHG | WEIGHT: 187.9 LBS | BODY MASS INDEX: 28.48 KG/M2 | TEMPERATURE: 98.2 F | HEART RATE: 73 BPM

## 2018-08-23 DIAGNOSIS — K92.1 BLOOD IN STOOL: ICD-10-CM

## 2018-08-23 DIAGNOSIS — Z00.00 HEALTH CARE MAINTENANCE: Primary | ICD-10-CM

## 2018-08-23 PROCEDURE — 99396 PREV VISIT EST AGE 40-64: CPT | Performed by: NURSE PRACTITIONER

## 2018-08-23 ASSESSMENT — ENCOUNTER SYMPTOMS
DIZZINESS: 0
WEAKNESS: 0
HEMATURIA: 0
BREAST MASS: 0
SORE THROAT: 0
HEARTBURN: 0
ABDOMINAL PAIN: 0
FREQUENCY: 0
JOINT SWELLING: 0
MYALGIAS: 0
HEMATOCHEZIA: 1
PARESTHESIAS: 0
CONSTIPATION: 0
HEADACHES: 0
PALPITATIONS: 0
COUGH: 0
EYE PAIN: 0
NERVOUS/ANXIOUS: 0
DIARRHEA: 0
SHORTNESS OF BREATH: 0
DYSURIA: 0
NAUSEA: 0
CHILLS: 0
ARTHRALGIAS: 0
FEVER: 0

## 2018-08-23 NOTE — PROGRESS NOTES
SUBJECTIVE:   CC: Laya Zuleta is an 48 year old woman who presents for preventive health visit.     Physical   Annual:     Getting at least 3 servings of Calcium per day:  Yes    Bi-annual eye exam:  Yes    Dental care twice a year:  Yes    Sleep apnea or symptoms of sleep apnea:  None    Diet:  Regular (no restrictions)    Frequency of exercise:  2-3 days/week    Duration of exercise:  30-45 minutes    Taking medications regularly:  Yes    Medication side effects:  None    Additional concerns today:  YES    Concerns today: blood in stool last week - wants colonoscopy - dad had colon cancer  Weight loss  Mammogram scheduled for tomorrow    Today's PHQ-2 Score:   PHQ-2 ( 1999 [employer]) 8/23/2018   Q1: Little interest or pleasure in doing things 0   Q2: Feeling down, depressed or hopeless 0   PHQ-2 Score 0   Q1: Little interest or pleasure in doing things Not at all   Q2: Feeling down, depressed or hopeless Not at all   PHQ-2 Score 0     Abuse: Current or Past(Physical, Sexual or Emotional)- No  Do you feel safe in your environment - Yes    Social History   Substance Use Topics     Smoking status: Never Smoker     Smokeless tobacco: Never Used     Alcohol use Yes      Comment: 2 times per week, 2 per time     Alcohol Use 8/23/2018   If you drink alcohol do you typically have greater than 3 drinks per day OR greater than 7 drinks per week? No   No flowsheet data found.    Reviewed orders with patient.  Reviewed health maintenance and updated orders accordingly - Yes  Labs reviewed in EPIC    Patient under age 50, mutual decision reflected in health maintenance.      Pertinent mammograms are reviewed under the imaging tab.  History of abnormal Pap smear: NO - age 30-65 PAP every 5 years with negative HPV co-testing recommended  PAP / HPV Latest Ref Rng & Units 7/13/2017 6/12/2014   PAP - NIL NIL   HPV 16 DNA NEG Negative -   HPV 18 DNA NEG Negative -   OTHER HR HPV NEG Negative -     Reviewed and updated as  "needed this visit by clinical staff  Tobacco  Allergies  Meds  Med Hx  Surg Hx  Fam Hx  Soc Hx        Reviewed and updated as needed this visit by Provider            Review of Systems   Constitutional: Negative for chills and fever.   HENT: Negative for congestion, ear pain, hearing loss and sore throat.    Eyes: Negative for pain and visual disturbance.   Respiratory: Negative for cough and shortness of breath.    Cardiovascular: Negative for chest pain, palpitations and peripheral edema.   Gastrointestinal: Positive for hematochezia. Negative for abdominal pain, constipation, diarrhea, heartburn and nausea.   Breasts:  Negative for tenderness, breast mass and discharge.   Genitourinary: Negative for dysuria, frequency, genital sores, hematuria, pelvic pain, urgency, vaginal bleeding and vaginal discharge.   Musculoskeletal: Negative for arthralgias, joint swelling and myalgias.   Skin: Negative for rash.   Neurological: Negative for dizziness, weakness, headaches and paresthesias.   Psychiatric/Behavioral: Negative for mood changes. The patient is not nervous/anxious.           OBJECTIVE:   /76 (BP Location: Right arm, Cuff Size: Adult Regular)  Pulse 73  Temp 98.2  F (36.8  C) (Tympanic)  Ht 5' 7.6\" (1.717 m)  Wt 187 lb 14.4 oz (85.2 kg)  SpO2 98%  BMI 28.91 kg/m2  Physical Exam  GENERAL: healthy, alert and no distress  EYES: Eyes grossly normal to inspection, PERRL and conjunctivae and sclerae normal  HENT: ear canals and TM's normal, nose and mouth without ulcers or lesions  NECK: no adenopathy, no asymmetry, masses, or scars and thyroid normal to palpation  RESP: lungs clear to auscultation - no rales, rhonchi or wheezes  BREAST: normal without masses, tenderness or nipple discharge and no palpable axillary masses or adenopathy  CV: regular rate and rhythm, normal S1 S2, no S3 or S4, no murmur, click or rub, no peripheral edema and peripheral pulses strong  ABDOMEN: soft, nontender, no " "hepatosplenomegaly, no masses and bowel sounds normal  MS: no gross musculoskeletal defects noted, no edema  SKIN: no suspicious lesions or rashes  NEURO: Normal strength and tone, mentation intact and speech normal  PSYCH: mentation appears normal, affect normal/bright    Diagnostic Test Results:  none     ASSESSMENT/PLAN:   1. Health care maintenance  - Lipid panel reflex to direct LDL Fasting  - Glucose  - CBC with platelets differential  - GASTROENTEROLOGY ADULT REF PROCEDURE ONLY    2. Blood in stool  Patient with 1 episode of blood in her stool last week, not associated with weight loss or abdominal pain. Patient with father having colon CA < age 60 so she qualifies for a screening colonoscopy. Asked her to do this within the next 2 weeks.   - GASTROENTEROLOGY ADULT REF PROCEDURE ONLY    COUNSELING:  Reviewed preventive health counseling, as reflected in patient instructions    BP Readings from Last 1 Encounters:   08/23/18 114/76     Estimated body mass index is 28.91 kg/(m^2) as calculated from the following:    Height as of this encounter: 5' 7.6\" (1.717 m).    Weight as of this encounter: 187 lb 14.4 oz (85.2 kg).      Weight management plan: Discussed healthy diet and exercise guidelines and patient will follow up in 12 months in clinic to re-evaluate.     reports that she has never smoked. She has never used smokeless tobacco.      Counseling Resources:  ATP IV Guidelines  Pooled Cohorts Equation Calculator  Breast Cancer Risk Calculator  FRAX Risk Assessment  ICSI Preventive Guidelines  Dietary Guidelines for Americans, 2010  USDA's MyPlate  ASA Prophylaxis  Lung CA Screening    Suzanna Smith, [place] [facility] CARLOS  "

## 2018-08-23 NOTE — MR AVS SNAPSHOT
After Visit Summary   8/23/2018    Laya Zuleta    MRN: [id number]           Patient Information     Date Of Birth          1970        Visit Information        Provider Department      8/23/2018 10:20 AM Suzanna Smith APRN [facility]    -  1    Blood in stool          Care Instructions    1. Schedule lab visit fasting (8-10 hours)  2. Colonoscopy people will call you        Preventive Health Recommendations  Female Ages 40 to 49    Yearly exam:     See your health care provider every year in order to  1. Review health changes.   2. Discuss preventive care.    3. Review your medicines if your doctor prescribed any.      Get a Pap test every three years (unless you have an abnormal result and your provider advises testing more often).      If you get Pap tests with HPV test, you only need to test every 5 years, unless you have an abnormal result. You do not need a Pap test if your uterus was removed (hysterectomy) and you have not had cancer.      You should be tested each year for STDs (sexually transmitted diseases), if you're at risk.     Ask your doctor if you should have a mammogram.      Have a colonoscopy (test for colon cancer) if someone in your family has had colon cancer or polyps before age 50.       Have a cholesterol test every 5 years.       Have a diabetes test (fasting glucose) after age 45. If you are at risk for diabetes, you should have this test every 3 years.    Shots: Get a flu shot each year. Get a tetanus shot every 10 years.     Nutrition:     Eat at least 5 servings of fruits and vegetables each day.    Eat whole-grain bread, whole-wheat pasta and brown rice instead of white grains and rice.    Get adequate Calcium and Vitamin D.      Lifestyle    Exercise at least 150 minutes a week (an average of 30 minutes a day, 5 days a week). This will help you control your weight and prevent  "disease.    Limit alcohol to one drink per day.    No smoking.     Wear sunscreen to prevent skin cancer.    See your dentist every six months for an exam and cleaning.          Follow-ups after your visit        Additional Services     GASTROENTEROLOGY ADULT REF PROCEDURE ONLY       Last Lab Result: No results found for: CR  Body mass index is 28.91 kg/(m^2).      Patient will be contacted to schedule procedure.     Please be aware that coverage of these services is subject to the terms and limitations of your health insurance plan.  Call member services at your health plan with any benefit or coverage questions.  Any procedures must be performed at a [place] facility OR coordinated by your clinic's referral office.    Please bring the following with you to your appointment:    (1) Any X-Rays, CTs or MRIs which have been performed.  Contact the facility where they were done to arrange for  prior to your scheduled appointment.    (2) List of current medications   (3) This referral request   (4) Any documents/labs given to you for this referral                  Your next 10 appointments already scheduled     Aug 24, 2018 11:00 AM CDT   MA SCREENING BILATERAL W/ EMILIE with EAMA1   [facility] ([facility])    [address] ,[address]  [facility] [place] [facility] [address] [phone]           Three-dimensional (3D) mammograms are available at [place] locations in [facility], [facility], [facility], and [place]. [facility] locations include [place] and [facility] in [place]. Benefits of 3D mammograms include: - Improved rate of cancer detection - Decreases your chance of having to go back for more tests, which means fewer: - \"False-positive\" results (This means that there is an abnormal area but it isn't cancer.) - Invasive testing procedures, such as a biopsy or surgery - Can provide clearer images of the breast if you have dense " "breast tissue. 3D mammography is an optional exam that anyone can have with a 2D mammogram. It doesn't replace or take the place of a 2D mammogram. 2D mammograms remain an effective screening test for all women.  Not all insurance companies cover the cost of a 3D mammogram. Check with your insurance.              Who to contact     If you have questions or need follow up information about today's clinic visit or your schedule please contact [facility] [place] directly at [phone].  Normal or non-critical lab and imaging results will be communicated to you by [employer], letter or phone within 4 business days after the clinic has received the results. If you do not hear from us within 7 days, please contact the clinic through [employer] or phone. If you have a critical or abnormal lab result, we will notify you by phone as soon as possible.  Submit refill requests through [employer] or call your pharmacy and they will forward the refill request to us. Please allow 3 business days for your refill to be completed.          Additional Information About Your Visit        [employer] Information     [employer] gives you secure access to your electronic health record. If you see a primary care provider, you can also send messages to your care team and make appointments. If you have questions, please call your primary care clinic.  If you do not have a primary care provider, please call [phone] and they will assist you.        Care EveryWhere ID     This is your Care EveryWhere ID. This could be used by other organizations to access your [facility] medical records  [id number]        Your Vitals Were     Pulse Temperature Height Pulse Oximetry BMI (Body Mass Index)       73 98.2  F (36.8  C) (Tympanic) 5' 7.6\" (1.717 m) 98% 28.91 kg/m2        Blood Pressure from Last 3 Encounters:   08/23/18 114/76   09/25/17 110/80   07/13/17 102/62    Weight from Last 3 Encounters:   08/23/18 187 lb 14.4 oz (85.2 kg)   09/25/17 190 lb 1.6 oz " (86.2 kg)   07/13/17 184 lb (83.5 kg)              We Performed the Following     CBC with platelets differential     GASTROENTEROLOGY ADULT REF PROCEDURE ONLY     Glucose     Lipid panel reflex to direct LDL Fasting        Primary Care Provider Office Phone # Fax #    Suzanna Smith APRLIBRA OSORIO [phone] [address] [place] [address]        Equal Access to Services     [facility]: Hadii aad ku hadasho Soomaali, waaxda luqadaha, qaybta kaalmada adeegyada, waxay idiin hayaan adeeg kharash la'aan . So [facility] [phone].    ATENCIÓN: Si habla español, tiene a chambers disposición servicios gratuitos de asistencia lingüística. [facility] [phone].    We comply with applicable federal civil rights laws and [place] laws. We do not discriminate on the basis of race, color, national origin, age, disability, sex, sexual orientation, or gender identity.            Thank you!     [facility]. Our goal is always to provide you with excellent care. Hearing back from our patients is one way we can continue to improve our services. Please take a few minutes to complete the written survey that you may receive in the mail after your visit with us. Thank you!             Your Updated Medication List - Protect others around you: Learn how to safely use, store and throw away your medicines at www.disposemymeds.org.          This list is accurate as of 8/23/18 11:00 AM.  Always use your most recent med list.                   Brand Name Dispense Instructions for use Diagnosis    BIOTIN PO           buPROPion 150 MG 24 hr tablet    WELLBUTRIN XL    90 tablet    Take 1 tablet (150 mg) by mouth every morning    PMDD (premenstrual dysphoric disorder)       calcium 600 + D 600-400 MG-UNIT per tablet   Generic drug:  calcium-vitamin D     100 tablet    Take 1 tablet by mouth 2 times daily.        citalopram 10 MG tablet    celeXA    90 tablet    Take  1 tablet (10 mg) by mouth daily    PMDD (premenstrual dysphoric disorder)       ibuprofen 400 MG tablet    ADVIL/MOTRIN    120 tablet    Take 800 mg by mouth every 6 hours as needed for moderate pain Reported on 4/21/2017        PROBIOTIC PO           SPIRONOLACTONE PO      Take 75 mg by mouth

## 2018-08-23 NOTE — PATIENT INSTRUCTIONS
1. Schedule lab visit fasting (8-10 hours)  2. Colonoscopy people will call you        Preventive Health Recommendations  Female Ages 40 to 49    Yearly exam:     See your health care provider every year in order to  1. Review health changes.   2. Discuss preventive care.    3. Review your medicines if your doctor prescribed any.      Get a Pap test every three years (unless you have an abnormal result and your provider advises testing more often).      If you get Pap tests with HPV test, you only need to test every 5 years, unless you have an abnormal result. You do not need a Pap test if your uterus was removed (hysterectomy) and you have not had cancer.      You should be tested each year for STDs (sexually transmitted diseases), if you're at risk.     Ask your doctor if you should have a mammogram.      Have a colonoscopy (test for colon cancer) if someone in your family has had colon cancer or polyps before age 50.       Have a cholesterol test every 5 years.       Have a diabetes test (fasting glucose) after age 45. If you are at risk for diabetes, you should have this test every 3 years.    Shots: Get a flu shot each year. Get a tetanus shot every 10 years.     Nutrition:     Eat at least 5 servings of fruits and vegetables each day.    Eat whole-grain bread, whole-wheat pasta and brown rice instead of white grains and rice.    Get adequate Calcium and Vitamin D.      Lifestyle    Exercise at least 150 minutes a week (an average of 30 minutes a day, 5 days a week). This will help you control your weight and prevent disease.    Limit alcohol to one drink per day.    No smoking.     Wear sunscreen to prevent skin cancer.    See your dentist every six months for an exam and cleaning.

## 2018-08-24 ENCOUNTER — RADIANT APPOINTMENT (OUTPATIENT)
Dept: MAMMOGRAPHY | Facility: CLINIC | Age: 48
End: 2018-08-24
Attending: NURSE PRACTITIONER
Payer: COMMERCIAL

## 2018-08-24 DIAGNOSIS — Z12.31 VISIT FOR SCREENING MAMMOGRAM: ICD-10-CM

## 2018-08-24 PROCEDURE — 77067 SCR MAMMO BI INCL CAD: CPT | Mod: TC

## 2018-08-24 PROCEDURE — 77063 BREAST TOMOSYNTHESIS BI: CPT | Mod: TC

## 2018-08-25 DIAGNOSIS — F32.81 PMDD (PREMENSTRUAL DYSPHORIC DISORDER): ICD-10-CM

## 2018-08-28 RX ORDER — CITALOPRAM HYDROBROMIDE 10 MG/1
TABLET ORAL
Qty: 90 TABLET | Refills: 3 | Status: SHIPPED | OUTPATIENT
Start: 2018-08-28 | End: 2019-09-19

## 2018-08-28 RX ORDER — BUPROPION HYDROCHLORIDE 150 MG/1
150 TABLET ORAL EVERY MORNING
Qty: 90 TABLET | Refills: 3 | Status: SHIPPED | OUTPATIENT
Start: 2018-08-28 | End: 2019-09-27

## 2018-08-28 NOTE — TELEPHONE ENCOUNTER
PHQ-9 score:  No flowsheet data found.  Taking for: PMDD (premenstrual dysphoric disorder) [F32.81]    Last physical on 8/23/18    Prescription approved per [facility] Refill Protocol.  Ludivina Howard RN  Message handled by Nurse Triage.

## 2018-09-17 DIAGNOSIS — Z00.00 HEALTH CARE MAINTENANCE: ICD-10-CM

## 2018-09-17 LAB
BASOPHILS # BLD AUTO: 0.1 10E9/L (ref 0–0.2)
BASOPHILS NFR BLD AUTO: 1.7 %
DIFFERENTIAL METHOD BLD: NORMAL
EOSINOPHIL # BLD AUTO: 0.3 10E9/L (ref 0–0.7)
EOSINOPHIL NFR BLD AUTO: 3.5 %
ERYTHROCYTE [DISTWIDTH] IN BLOOD BY AUTOMATED COUNT: 12.8 % (ref 10–15)
HCT VFR BLD AUTO: 42.9 % (ref 35–47)
HGB BLD-MCNC: 14.1 G/DL (ref 11.7–15.7)
LYMPHOCYTES # BLD AUTO: 2.2 10E9/L (ref 0.8–5.3)
LYMPHOCYTES NFR BLD AUTO: 28.8 %
MCH RBC QN AUTO: 30.1 PG (ref 26.5–33)
MCHC RBC AUTO-ENTMCNC: 32.9 G/DL (ref 31.5–36.5)
MCV RBC AUTO: 92 FL (ref 78–100)
MONOCYTES # BLD AUTO: 0.6 10E9/L (ref 0–1.3)
MONOCYTES NFR BLD AUTO: 7.7 %
NEUTROPHILS # BLD AUTO: 4.5 10E9/L (ref 1.6–8.3)
NEUTROPHILS NFR BLD AUTO: 58.3 %
PLATELET # BLD AUTO: 269 10E9/L (ref 150–450)
RBC # BLD AUTO: 4.69 10E12/L (ref 3.8–5.2)
WBC # BLD AUTO: 7.8 10E9/L (ref 4–11)

## 2018-09-17 PROCEDURE — 36415 COLL VENOUS BLD VENIPUNCTURE: CPT | Performed by: NURSE PRACTITIONER

## 2018-09-17 PROCEDURE — 82947 ASSAY GLUCOSE BLOOD QUANT: CPT | Performed by: NURSE PRACTITIONER

## 2018-09-17 PROCEDURE — 80061 LIPID PANEL: CPT | Performed by: NURSE PRACTITIONER

## 2018-09-17 PROCEDURE — 85025 COMPLETE CBC W/AUTO DIFF WBC: CPT | Performed by: NURSE PRACTITIONER

## 2018-09-18 LAB
CHOLEST SERPL-MCNC: 197 MG/DL
GLUCOSE SERPL-MCNC: 91 MG/DL (ref 70–99)
HDLC SERPL-MCNC: 43 MG/DL
LDLC SERPL CALC-MCNC: 107 MG/DL
NONHDLC SERPL-MCNC: 154 MG/DL
TRIGL SERPL-MCNC: 233 MG/DL

## 2018-11-19 ENCOUNTER — HOSPITAL ENCOUNTER (OUTPATIENT)
Facility: CLINIC | Age: 48
Discharge: HOME | End: 2018-11-19
Attending: INTERNAL MEDICINE | Admitting: INTERNAL MEDICINE
Payer: COMMERCIAL

## 2018-11-19 ENCOUNTER — SURGERY (OUTPATIENT)
Age: 48
End: 2018-11-19

## 2018-11-19 VITALS
OXYGEN SATURATION: 95 % | SYSTOLIC BLOOD PRESSURE: 118 MMHG | WEIGHT: 183 LBS | DIASTOLIC BLOOD PRESSURE: 83 MMHG | RESPIRATION RATE: 16 BRPM | BODY MASS INDEX: 27.74 KG/M2 | HEIGHT: 68 IN

## 2018-11-19 DIAGNOSIS — Z80.0 FAMILY HISTORY OF COLON CANCER: Primary | ICD-10-CM

## 2018-11-19 LAB — COLONOSCOPY: NORMAL

## 2018-11-19 PROCEDURE — G0500 MOD SEDAT ENDO SERVICE >5YRS: HCPCS | Performed by: INTERNAL MEDICINE

## 2018-11-19 PROCEDURE — G0121 COLON CA SCRN NOT HI RSK IND: HCPCS | Mod: 74 | Performed by: INTERNAL MEDICINE

## 2018-11-19 PROCEDURE — 45378 DIAGNOSTIC COLONOSCOPY: CPT | Performed by: INTERNAL MEDICINE

## 2018-11-19 PROCEDURE — 25000128 H RX IP 250 OP 636: Performed by: INTERNAL MEDICINE

## 2018-11-19 PROCEDURE — G0105 COLORECTAL SCRN; HI RISK IND: HCPCS | Mod: 74 | Performed by: INTERNAL MEDICINE

## 2018-11-19 RX ORDER — NALOXONE HYDROCHLORIDE 0.4 MG/ML
.1-.4 INJECTION, SOLUTION INTRAMUSCULAR; INTRAVENOUS; SUBCUTANEOUS
Status: DISCONTINUED | OUTPATIENT
Start: 2018-11-19 | End: 2018-11-19 | Stop reason: HOSPADM

## 2018-11-19 RX ORDER — ONDANSETRON 2 MG/ML
4 INJECTION INTRAMUSCULAR; INTRAVENOUS
Status: DISCONTINUED | OUTPATIENT
Start: 2018-11-19 | End: 2018-11-19 | Stop reason: HOSPADM

## 2018-11-19 RX ORDER — FLUMAZENIL 0.1 MG/ML
0.2 INJECTION, SOLUTION INTRAVENOUS
Status: DISCONTINUED | OUTPATIENT
Start: 2018-11-19 | End: 2018-11-19 | Stop reason: HOSPADM

## 2018-11-19 RX ORDER — FENTANYL CITRATE 50 UG/ML
INJECTION, SOLUTION INTRAMUSCULAR; INTRAVENOUS PRN
Status: DISCONTINUED | OUTPATIENT
Start: 2018-11-19 | End: 2018-11-19 | Stop reason: HOSPADM

## 2018-11-19 RX ORDER — ONDANSETRON 4 MG/1
4 TABLET, ORALLY DISINTEGRATING ORAL EVERY 6 HOURS PRN
Status: DISCONTINUED | OUTPATIENT
Start: 2018-11-19 | End: 2018-11-19 | Stop reason: HOSPADM

## 2018-11-19 RX ORDER — ONDANSETRON 2 MG/ML
4 INJECTION INTRAMUSCULAR; INTRAVENOUS EVERY 6 HOURS PRN
Status: DISCONTINUED | OUTPATIENT
Start: 2018-11-19 | End: 2018-11-19 | Stop reason: HOSPADM

## 2018-11-19 RX ORDER — LIDOCAINE 40 MG/G
CREAM TOPICAL
Status: DISCONTINUED | OUTPATIENT
Start: 2018-11-19 | End: 2018-11-19 | Stop reason: HOSPADM

## 2018-11-19 RX ADMIN — FENTANYL CITRATE 50 MCG: 50 INJECTION, SOLUTION INTRAMUSCULAR; INTRAVENOUS at 09:36

## 2018-11-19 RX ADMIN — MIDAZOLAM 1 MG: 1 INJECTION INTRAMUSCULAR; INTRAVENOUS at 09:32

## 2018-11-19 RX ADMIN — MIDAZOLAM 1 MG: 1 INJECTION INTRAMUSCULAR; INTRAVENOUS at 09:34

## 2018-11-19 RX ADMIN — MIDAZOLAM 2 MG: 1 INJECTION INTRAMUSCULAR; INTRAVENOUS at 09:28

## 2018-11-19 RX ADMIN — FENTANYL CITRATE 100 MCG: 50 INJECTION, SOLUTION INTRAMUSCULAR; INTRAVENOUS at 09:27

## 2018-11-19 NOTE — PROGRESS NOTES
PRE-PROCEDURE H&P    CHIEF COMPLAINT / REASON FOR PROCEDURE:  screening    PERTINENT HISTORY :    Past Medical History:   Diagnosis Date     Acne 2010     CARDIOVASCULAR SCREENING; LDL GOAL LESS THAN 160 2010      Past Surgical History:   Procedure Laterality Date     LAPAROSCOPIC APPENDECTOMY CHILD      1994     SINUS SURGERY      1/10         Bleeding tendencies:  No    Relevant Family History:  NONE     Relevant Social History:  NONE      A relevant review of systems was performed and was negative    Current symptoms include: none    ALLERGIES/SENSITIVITIES: No Known Allergies    CURRENT MEDICATIONS:   Prior to Admission Medications   Prescriptions Last Dose Informant Patient Reported? Taking?   BIOTIN PO Past Week  Yes Yes   Calcium Carbonate-Vitamin D (CALCIUM 600 + D) 600-400 MG-UNIT tablet   Yes No   Sig: Take 1 tablet by mouth 2 times daily.   Probiotic Product (PROBIOTIC PO) Past Week  Yes Yes   SPIRONOLACTONE PO 2018  Yes Yes   Sig: Take 75 mg by mouth   buPROPion (WELLBUTRIN XL) 150 MG 24 hr tablet 2018  No Yes   Sig: Take 1 tablet (150 mg) by mouth every morning   citalopram (CELEXA) 10 MG tablet 2018  No Yes   Sig: TAKE 1 TABLET BY MOUTH EVERY DAY   ibuprofen (ADVIL,MOTRIN) 400 MG tablet   Yes No   Sig: Take 800 mg by mouth every 6 hours as needed for moderate pain Reported on 2017      Facility-Administered Medications: None        PRE-SEDATION ASSESSMENT:    Lung Exam:  normal  Heart Exam:  normal  Airway Exam: normal  Previous reaction to anesthesia/sedation:   No  Sedation plan based on assessment: moderate sedation  ASA Classification:  2 - Mild systemic disease    Comments: father  from colon cancer, sister with polyps    IMPRESSION:  screening    PLAN:  colonoscopy     Stacia Cm MD  [facility]  Office: [phone]

## 2018-11-19 NOTE — LETTER
August 27, 2018      Laya Zuleta  [address]  [place] [address]        Thank you for choosing [facility]. You are scheduled for the following service.     Date:  9/19/2018 Wednesday             Procedure:  COLONOSCOPY  Doctor:        Dr. Tonio Vu   Arrival Time:  8:30 AM  *Check in at Emergency/Endoscopy desk*  Procedure Time:  9:00 AM    Location:   [facility]        Endoscopy Department, First Floor (Enter through ER Doors) *        [address] [place], [place] [address]      [phone] or [phone] () to reschedule      MIRALAX -GATORADE  PREP  Colonoscopy is the most accurate test to detect colon polyps and colon cancer; and the only test where polyps can be removed. During this procedure, a doctor examines the lining of your large intestine and rectum through a flexible tube.     Transportation  Arrange for a ride for the day of your procedure with a responsible adult.  A taxi ride is not an option unless you are accompanied by a responsible adult. If you fail to arrange transportation with a responsible adult, your procedure will be cancelled and rescheduled.    Purchase the  following supplies at your local pharmacy:  - 2 (two) bisacodyl tablets: each tablet contains 5 mg.  (Dulcolax  laxative NOT Dulcolax  stool softener)   - 1 (one) 8.3 oz bottle of Polyethylene Glycol (PEG) 3350 Powder   (MiraLAX , Smooth LAX , ClearLAX  or equivalent)  - 64 oz Gatorade    Regular Gatorade, Gatorade G2 , Powerade , Powerade Zero  or Pedialyte  is acceptable. Red colored flavors are not allowed; all other colors (yellow, green, orange, purple and blue) are okay. It is also okay to buy two 2.12 oz packets of powdered Gatorade that can be mixed with water to a total volume of 64 oz of liquid.  - 1 (one) 10 oz bottle of Magnesium Citrate (Red colored flavors are not allowed)  It is also okay for you to use a 0.5 oz package of powdered  magnesium citrate (17 g) mixed with 10 oz of water.  *Do not purchase or take the Magnesium Citrate if you have history of kidney disease.    PREPARATION FOR COLONOSCOPY    7 days before:    Discontinue fiber supplements and medications containing iron. This includes Metamucil  and Fibercon ; and multivitamins with iron.  3 days before:    Begin a low-fiber diet. A low-fiber diet helps making the cleanout more effective.     Examples of a low-fiber diet include (but are not limited to): white bread, white rice, pasta, crackers, fish, chicken, eggs, ground beef, creamy peanut butter, cooked/steamed/boiled vegetables, canned fruit, bananas, melons, milk, plain yogurt cheese, salad dressing and other condiments.     The following are not allowed on a low-fiber diet: seeds, nuts, popcorn, bran, whole wheat, corn, quinoa, raw fruits and vegetables, berries and dried fruit, beans and lentils.    For additional details on low-fiber diet, please refer to the table on the last page.  2 days before:    Continue the low-fiber diet.     Drink at least 8 glasses of water throughout the day.     Stop eating solid foods at 11:45 pm.  1 day before:    In the morning: begin a clear liquid diet (liquids you can see through).     Examples of a clear liquid diet include: water, clear broth or bouillon, Gatorade, Pedialyte or Powerade, carbonated and non-carbonated soft drinks (Sprite , 7-Up , ginger ale), strained fruit juices without pulp (apple, white grape, white cranberry), Jell-O  and popsicles.     The following are not allowed on a clear liquid diet: red liquids, alcoholic beverages, dairy products (milk, creamer, and yogurt), protein shakes, creamy broths, juice with pulp and chewing tobacco.    At noon: take 2 (two) bisacodyl tablets     At 4 (and no later than 6pm): start drinking the Miralax-Gatorade preparation (8.3 oz of Miralax mixed with 64 oz of Gatorade in a large pitcher). Drink 1(one) 8 oz glass every 15 minutes  thereafter, until the mixture is gone.    COLON CLEANSING TIPS: drink adequate amounts of fluids before and after your colon cleansing to prevent dehydration. Stay near a toilet because you will have diarrhea. Even if you are sitting on the toilet, continue to drink the cleansing solution every 15 minutes. If you feel nauseous or vomit, rinse your mouth with water, take a 15 to 30-minute-break and then continue drinking the solution. You will be uncomfortable until the stool has flushed from your colon (in about 2 to 4 hours). You may feel chilled.    Day of your procedure  You may take all of your morning medications including blood pressure medications, blood thinners (if you have not been instructed to stop these by our office), methadone, anti-seizure medications with sips of water 3 hours prior to your procedure or earlier. Do not take insulin or vitamins prior to your procedure. Continue the clear liquid diet.   4 hours prior: drink 10 oz of magnesium citrate. It may be easier to drink it with a straw.    STOP consuming all liquids after that.     Do not take anything by mouth during this time.     Allow extra time to travel to your procedure as you may need to stop and use a restroom along the way.  You are ready for the procedure, if you followed all instructions and your stool is no longer formed, but clear or yellow liquid. If you are unsure whether your colon is clean, please call our office at [phone] before you leave for your appointment.  Bring the following to your procedure:  - Insurance Card/Photo ID.   - List of current medications including over-the-counter medications and supplements.   - Your rescue inhaler if you currently use one to control asthma.      Canceling or rescheduling your appointment:   If you must cancel or reschedule your appointment, please call [phone] as soon as possible.      COLONOSCOPY PRE-PROCEDURE CHECKLIST  If you have diabetes, ask your regular doctor for diet  and medication restrictions.  If you take an anticoagulant or anti-platelet medication (such as Coumadin , Lovenox , Pradaxa , Xarelto , Eliquis , etc.), please call your primary doctor for advice on holding this medication.  If you take aspirin you may continue to do so.  If you are or may be pregnant, please discuss the risks and benefits of this procedure with your doctor.          What happens during a colonoscopy?    Plan to spend up to two hours, starting at registration time, at the endoscopy center the day of your procedure. The colonoscopy takes an average of 15 to 30 minutes. Recovery time is about 30 minutes.    Before the exam:    You will change into a gown.    Your medical history and medication list will be reviewed with you, unless that has been done over the phone prior to the procedure.     A nurse will insert an intravenous (IV) line into your hand or arm.    The doctor will meet with you and will give you a consent form to sign.    During the exam:     Medicine will be given through the IV line to help you relax.     Your heart rate and oxygen levels will be monitored. If your blood pressure is low, you may be given fluids through the IV line.     The doctor will insert a flexible hollow tube, called a colonoscope, into your rectum. The scope will be advanced slowly through the large intestine (colon).    You may have a feeling of fullness or pressure.     If an abnormal tissue or a polyp is found, the doctor may remove it through the endoscope for closer examination, or biopsy. Tissue removal is painless    After the exam:           Any tissue samples removed during the exam will be sent to a lab for evaluation. It may take 5-7 working days for you to be notified of the results.     A nurse will provide you with complete discharge instructions before you leave the endoscopy center. Be sure to ask the nurse for specific instructions if you take blood thinners such as Aspirin, Coumadin or Plavix.      The doctor will prepare a full report for you and for the physician who referred you for the procedure.     Your doctor will talk with you about the initial results of your exam.      Medication given during the exam will prohibit you from driving for the rest of the day.     Following the exam, you may resume your normal diet. Your first meal should be light, no greasy foods. Avoid alcohol until the next day.     You may resume your regular activities the day after the procedure.     LOW-FIBER DIET    Foods RECOMMENDED Foods to AVOID   Breads, Cereal, Rice and Pasta:   White bread, rolls, biscuits, croissant and rocio toast.   Waffles, Arabic toast and pancakes.   White rice, noodles, pasta, macaroni and peeled cooked potatoes.   Plain crackers and saltines.   Cooked cereals: farina, cream of rice.   Cold cereals: Puffed Rice , Rice Krispies , Corn Flakes  and Special K    Breads, Cereal, Rice and Pasta:   Breads or rolls with nuts, seeds or fruit.   Whole wheat, pumpernickel, rye breads and cornbread.   Potatoes with skin, brown or wild rice, and kasha (buckwheat).     Vegetables:   Tender cooked and canned vegetables without seeds: carrots, asparagus tips, green or wax beans, pumpkin, spinach, lima beans. Vegetables:   Raw or steamed vegetables.   Vegetables with seeds.   Sauerkraut.   Winter squash, peas, broccoli, Brussel sprouts, cabbage, onions, cauliflower, baked beans, peas and corn.   Fruits:   Strained fruit juice.   Canned fruit, except pineapple.   Ripe bananas and melon. Fruits:   Prunes and prune juice.   Raw fruits.   Dried fruits: figs, dates and raisins.   Milk/Dairy:   Milk: plain or flavored.   Yogurt, custard and ice cream.   Cheese and cottage cheese Milk/Dairy:     Meat and other proteins:   ground, well-cooked tender beef, lamb, ham, veal, pork, fish, poultry and organ meats.   Eggs.   Peanut butter without nuts. Meat and other proteins:   Tough, fibrous meats with gristle.   Dry beans,  peas and lentils.   Peanut butter with nuts.   Tofu.   Fats, Snack, Sweets, Condiments and Beverages:   Margarine, butter, oils, mayonnaise, sour cream and salad dressing, plain gravy.   Sugar, hard candy, clear jelly, honey and syrup.   Spices, cooked herbs, bouillon, broth and soups made with allowed vegetable, ketchup and mustard.   Coffee, tea and carbonated drinks.   Plain cakes, cookies and pretzels.   Gelatin, plain puddings, custard, ice cream, sherbet and popsicles. Fats, Snack, Sweets, Condiments and Beverages:   Nuts, seeds and coconut.   Jam, marmalade and preserves.   Pickles, olives, relish and horseradish.   All desserts containing nuts, seeds, dried fruit and coconut; or made from whole grains or bran.   Candy made with nuts or seeds.   Popcorn.                     DIRECTIONS TO THE ENDOSCOPY DEPARTMENT     From the north ([facility])  Take [address] South, exit on [address] [address]. Get into the left hand delmy, turn left (east), go one-half mile to [address] and turn left. Go north to the first stoplight, take a right on [address] and follow it to the Emergency entrance.    From the south ([facility])  Take 35N to the 35E split and exit on [address] [address]. On [address] , turn left (west) to [address]. Turn right (north) on [address]. Go north to the first stoplight, take a right on [address] and follow it to the Emergency entrance.    From the east via 35E ([facility])  Take [address] south to [address] [address] exit. Turn right on [address] . Go west to [address]. Turn right (north) on [address]. Go to the first stoplight, take a right and follow on [address] to the Emergency entrance.    From the east via [address] ([facility])  Take [address] to [address]. Turn left (south) on [address] to [address]. Turn left (east) on [address] and follow it to the Emergency entrance.    From the west via  [address] ([place], [place])  Take [address] east to [address]. Turn right (south) on [address] to [facility]. Turn left (east) on [facility] and follow it to the Emergency entrance.

## 2018-12-04 ENCOUNTER — MYC MEDICAL ADVICE (OUTPATIENT)
Dept: PEDIATRICS | Facility: CLINIC | Age: 48
End: 2018-12-04

## 2018-12-04 NOTE — TELEPHONE ENCOUNTER
Discussed with AM-colonoscopy with sedation best route but follow up with GI for their recommendations.  [place] Martínez RN

## 2018-12-21 ENCOUNTER — OFFICE VISIT (OUTPATIENT)
Dept: PEDIATRICS | Facility: CLINIC | Age: 48
End: 2018-12-21
Payer: COMMERCIAL

## 2018-12-21 VITALS
HEIGHT: 69 IN | DIASTOLIC BLOOD PRESSURE: 66 MMHG | TEMPERATURE: 97.6 F | OXYGEN SATURATION: 98 % | SYSTOLIC BLOOD PRESSURE: 108 MMHG | WEIGHT: 186.1 LBS | BODY MASS INDEX: 27.56 KG/M2 | HEART RATE: 78 BPM

## 2018-12-21 DIAGNOSIS — R39.89 PAIN IN URETHRA: ICD-10-CM

## 2018-12-21 DIAGNOSIS — N89.8 VAGINAL ITCHING: Primary | ICD-10-CM

## 2018-12-21 LAB
ALBUMIN UR-MCNC: NEGATIVE MG/DL
APPEARANCE UR: CLEAR
BILIRUB UR QL STRIP: NEGATIVE
COLOR UR AUTO: YELLOW
GLUCOSE UR STRIP-MCNC: NEGATIVE MG/DL
HGB UR QL STRIP: ABNORMAL
KETONES UR STRIP-MCNC: NEGATIVE MG/DL
LEUKOCYTE ESTERASE UR QL STRIP: NEGATIVE
NITRATE UR QL: NEGATIVE
NON-SQ EPI CELLS #/AREA URNS LPF: NORMAL /LPF
PH UR STRIP: 5.5 PH (ref 5–7)
RBC #/AREA URNS AUTO: NORMAL /HPF
SOURCE: ABNORMAL
SP GR UR STRIP: 1.01 (ref 1–1.03)
SPECIMEN SOURCE: NORMAL
UROBILINOGEN UR STRIP-ACNC: 0.2 EU/DL (ref 0.2–1)
WBC #/AREA URNS AUTO: NORMAL /HPF
WET PREP SPEC: NORMAL

## 2018-12-21 PROCEDURE — 87210 SMEAR WET MOUNT SALINE/INK: CPT | Performed by: NURSE PRACTITIONER

## 2018-12-21 PROCEDURE — 81001 URINALYSIS AUTO W/SCOPE: CPT | Performed by: NURSE PRACTITIONER

## 2018-12-21 PROCEDURE — 99213 OFFICE O/P EST LOW 20 MIN: CPT | Performed by: NURSE PRACTITIONER

## 2018-12-21 RX ORDER — CLOBETASOL PROPIONATE 0.5 MG/G
CREAM TOPICAL 2 TIMES DAILY
Qty: 60 G | Refills: 0 | Status: SHIPPED | OUTPATIENT
Start: 2018-12-21 | End: 2019-01-20

## 2018-12-21 ASSESSMENT — MIFFLIN-ST. JEOR: SCORE: 1525.58

## 2018-12-21 NOTE — PATIENT INSTRUCTIONS
I think you may simply have contact dermatitis (irritation from tide pods, dryer sheets) or possibly early lichen sclerosis.    Please wash all underwear in dreft-no dryer sheets. Avoid scented soap, etc.    Please start using clobetasol cream liberally twice a day for the next month, then we will move to every other day. You need to see dermatology in the next few weeks.

## 2018-12-21 NOTE — PROGRESS NOTES
"  SUBJECTIVE:   Laya Zuleta is a 48 year old female who presents to clinic today for the following health issues:    Vaginal Symptoms  External - not vaginal      Duration: 2 months    Description  itching and burning - external - around urethral area    Intensity:  Moderate to severe    Accompanying signs and symptoms (fever/dysuria/abdominal or back pain): None    History  Sexually active: yes, single partner, contraception - vasectomy  Possibility of pregnancy: No  Recent antibiotic use: no     Precipitating or alleviating factors: None  Therapies tried and outcome: Monistat  - twice: more than 3 weeks ago Outcome: Symptoms not alleviated    ROS: const/derm/gu/gyn otherwise negative     OBJECTIVE:  /66 (BP Location: Right arm, Cuff Size: Adult Large)   Pulse 78   Temp 97.6  F (36.4  C) (Tympanic)   Ht 1.74 m (5' 8.5\")   Wt 84.4 kg (186 lb 1.6 oz)   SpO2 98%   BMI 27.88 kg/m    CONSTITUTIONAL: Alert, well-nourished, well-groomed, NAD  RESP: Lungs CTA. No wheeze, rhonchi, rales.  CV: HRRR S1 S2 No MRG. No peripheral edema  : Labia majora with slightly bumpy appearance, only slightly erythematous but excoriated.     ASSESSMENT/PLAN:  (N89.8) Vaginal itching  (primary encounter diagnosis)  Comment: External labial itching. Wet prep negative. No signs of candidal dermatitis. Possible lichenification but no hypopigmentation. DDX includes contact dermatitis or lichen sclerosis.   Plan: Wet prep          Patient Instructions   I think you may simply have contact dermatitis (irritation from tide pods, dryer sheets) or possibly early lichen sclerosis.    Please wash all underwear in dreft-no dryer sheets. Avoid scented soap, etc.    Please start using clobetasol cream liberally twice a day for the next month, then we will move to every other day. You need to see dermatology in the next few weeks.         Suzanna Smith, FNP-DNP.        "

## 2019-01-29 ENCOUNTER — TRANSFERRED RECORDS (OUTPATIENT)
Dept: HEALTH INFORMATION MANAGEMENT | Facility: CLINIC | Age: 49
End: 2019-01-29

## 2019-06-13 ENCOUNTER — OFFICE VISIT (OUTPATIENT)
Dept: PEDIATRICS | Facility: CLINIC | Age: 49
End: 2019-06-13
Payer: COMMERCIAL

## 2019-06-13 VITALS
HEIGHT: 69 IN | OXYGEN SATURATION: 99 % | DIASTOLIC BLOOD PRESSURE: 80 MMHG | SYSTOLIC BLOOD PRESSURE: 120 MMHG | BODY MASS INDEX: 26.96 KG/M2 | TEMPERATURE: 97.8 F | WEIGHT: 182 LBS | HEART RATE: 68 BPM

## 2019-06-13 DIAGNOSIS — R22.9 SUBCUTANEOUS MASS: Primary | ICD-10-CM

## 2019-06-13 PROCEDURE — 99213 OFFICE O/P EST LOW 20 MIN: CPT | Mod: GE | Performed by: STUDENT IN AN ORGANIZED HEALTH CARE EDUCATION/TRAINING PROGRAM

## 2019-06-13 ASSESSMENT — MIFFLIN-ST. JEOR: SCORE: 1501.99

## 2019-06-13 NOTE — PATIENT INSTRUCTIONS
As we discussed, you have a soft tissue mass most likely cyst vs lipoma.  No associated concerning signs or symptoms.  Please avoid touching except once a week and if any new concerns or change in symptoms follow up for referral for soft tissue ultrasound and possible blood work.    Brian Dewitt MD

## 2019-06-13 NOTE — PROGRESS NOTES
Subjective     Laya Zuleta is a 49 year old female who presents to clinic today for the following health issues:    HPI   Mass/ lump       Duration: noticed yesterday     Description (location/character/radiation): under right arm     Intensity:  Mild, tender to palpation, 1/10    Accompanying signs and symptoms: tender to the touch, under the skin, dime size     History (similar episodes/previous evaluation): cyst in the past- more surface     Precipitating or alleviating factors: None    Therapies tried and outcome: None     First noticed yesterday.  No additional lumps or bumps noted.  Yearly mammograms with normal findings.  No fevers, chills, night sweats or weight loss.  No abdominal pain, change in bowel movements, diarrhea, or constipation; normal colonoscopy.  No illnesses in the last couple months.  No prior trauma or surgeries.  Premenopausal.  No current smoking history, remote in 90s.  No medical history.      Patient Active Problem List   Diagnosis     CARDIOVASCULAR SCREENING; LDL GOAL LESS THAN 160     Chronic maxillary sinusitis     Acne     Family history of breast cancer     Past Surgical History:   Procedure Laterality Date     COLONOSCOPY N/A 11/19/2018    Procedure: Colonoscopy;  Surgeon: Stacia Cm MD;  Location:  GI     LAPAROSCOPIC APPENDECTOMY CHILD      1994     SINUS SURGERY      1/10       Social History     Tobacco Use     Smoking status: Never Smoker     Smokeless tobacco: Never Used   Substance Use Topics     Alcohol use: Yes     Comment: 2 times per week, 2 per time     Family History   Problem Relation Age of Onset     Colon Cancer Father      Cancer Sister 23        cervical cancer     Cancer Sister 44        lymphoma     Family History Negative Daughter      Cancer Paternal Grandmother         lymphoma         Current Outpatient Medications   Medication Sig Dispense Refill     buPROPion (WELLBUTRIN XL) 150 MG 24 hr tablet Take 1 tablet (150 mg) by mouth  "every morning 90 tablet 3     Calcium Carbonate-Vitamin D (CALCIUM 600 + D) 600-400 MG-UNIT tablet Take 1 tablet by mouth 2 times daily. 100 tablet 3     citalopram (CELEXA) 10 MG tablet TAKE 1 TABLET BY MOUTH EVERY DAY 90 tablet 3     SPIRONOLACTONE PO Take 75 mg by mouth       No Known Allergies  BP Readings from Last 3 Encounters:   06/13/19 120/80   12/21/18 108/66   11/19/18 118/83    Wt Readings from Last 3 Encounters:   06/13/19 82.6 kg (182 lb)   12/21/18 84.4 kg (186 lb 1.6 oz)   11/19/18 83 kg (183 lb)            Reviewed and updated as needed this visit by Provider         Review of Systems   ROS COMP: Constitutional, HEENT, cardiovascular, pulmonary, gi and gu systems are negative, except as otherwise noted.      Objective    /80   Pulse 68   Temp 97.8  F (36.6  C) (Tympanic)   Ht 1.74 m (5' 8.5\")   Wt 82.6 kg (182 lb)   SpO2 99%   BMI 27.27 kg/m    Body mass index is 27.27 kg/m .  Physical Exam    GENERAL: healthy, alert and slightly anxious  EYES: Eyes grossly normal to inspection, PERRL and conjunctivae and sclerae normal  NECK: no cervical, supraclavicular, axillary or inguinal lymphadenopathy  RESP: lungs clear to auscultation - no rales, rhonchi or wheezes.  Normal rate and effort.    CV: regular rate and rhythm, normal S1 S2, no S3 or S4, no murmur, click or rub, no peripheral edema and peripheral pulses strong  ABDOMEN: soft, nontender, no hepatosplenomegaly, no masses and bowel sounds present  MS: no gross musculoskeletal defects noted, no edema  SKIN: Subcutaneous 1 cm in diameter superficial soft tissue mass, mobile, tender to palpation in right axilla.  no overlying suspicious lesions or rashes    Diagnostic Test Results:  none         Assessment & Plan   1. Subcutaneous mass  Most likely simple cyst vs lipoma.  No overlying skin changes, mobile, and tender to palpation.    - watchful monitoring and follow up for soft tissue US if new symptoms or concerning    See Patient " Instructions    Return in about 1 month (around 7/11/2019), or if symptoms worsen or fail to improve.     The patient was discussed with Dr. Bowles, the attending, who agrees with the plan as stated above.      Brian Dewitt MD  [facility] [place]    ===========  STAFF NOTE:  Patient discussed with resident physician today.  We discussed sigala portions of the visit and I participated in the evaluation and management of the patient today.     Tonio Bowles MD

## 2019-09-10 ENCOUNTER — ANCILLARY PROCEDURE (OUTPATIENT)
Dept: MAMMOGRAPHY | Facility: CLINIC | Age: 49
End: 2019-09-10
Payer: COMMERCIAL

## 2019-09-10 DIAGNOSIS — Z12.31 VISIT FOR SCREENING MAMMOGRAM: ICD-10-CM

## 2019-09-10 PROCEDURE — 77067 SCR MAMMO BI INCL CAD: CPT | Mod: TC

## 2019-09-10 PROCEDURE — 77063 BREAST TOMOSYNTHESIS BI: CPT | Mod: TC

## 2019-09-19 DIAGNOSIS — F32.81 PMDD (PREMENSTRUAL DYSPHORIC DISORDER): ICD-10-CM

## 2019-09-19 NOTE — TELEPHONE ENCOUNTER
"Requested Prescriptions   Pending Prescriptions Disp Refills     citalopram (CELEXA) 10 MG tablet [Pharmacy Med Name: CITALOPRAM 10MG  Last Written Prescription Date:  8-  Last Fill Quantity: 90 tablet,  # refills: 3   Last office visit: 6/13/2019 with prescribing provider:     Future Office Visit:   Next 5 appointments (look out 90 days)    Sep 27, 2019  4:00 PM CDT  Adult physical with Suzanna Smith, APRN CNP, EA EXAM ROOM 16  [facility] ([facility] [address] [place] [address]  [address]  [facility] [id number] [phone]          TABLETS] 90 tablet 0     Sig: TAKE 1 TABLET BY MOUTH EVERY DAY       SSRIs Protocol Failed - 9/19/2019  9:05 AM        Failed - PHQ-9 score less than 5 in past 6 months     Please review last PHQ-9 score.           Passed - Medication is active on med list        Passed - Patient is age 18 or older        Passed - No active pregnancy on record        Passed - No positive pregnancy test in last 12 months        Passed - Recent (6 mo) or future (30 days) visit within the authorizing provider's specialty     Patient had office visit in the last 6 months or has a visit in the next 30 days with authorizing provider or within the authorizing provider's specialty.  See \"Patient Info\" tab in inbasket, or \"Choose Columns\" in Meds & Orders section of the refill encounter.              "

## 2019-09-22 RX ORDER — CITALOPRAM HYDROBROMIDE 10 MG/1
TABLET ORAL
Qty: 90 TABLET | Refills: 0 | Status: SHIPPED | OUTPATIENT
Start: 2019-09-22 | End: 2019-09-27

## 2019-09-22 NOTE — TELEPHONE ENCOUNTER
Medication is being filled for 1 time refill only due to:  Patient needs to be seen because it has been more than one year since last visit.   Pt is scheduled.  Alondra Osei RN

## 2019-09-27 ENCOUNTER — OFFICE VISIT (OUTPATIENT)
Dept: PEDIATRICS | Facility: CLINIC | Age: 49
End: 2019-09-27
Payer: COMMERCIAL

## 2019-09-27 DIAGNOSIS — Z00.00 ROUTINE GENERAL MEDICAL EXAMINATION AT A HEALTH CARE FACILITY: Primary | ICD-10-CM

## 2019-09-27 DIAGNOSIS — R63.5 WEIGHT GAIN: ICD-10-CM

## 2019-09-27 DIAGNOSIS — F32.81 PMDD (PREMENSTRUAL DYSPHORIC DISORDER): ICD-10-CM

## 2019-09-27 PROCEDURE — 90682 RIV4 VACC RECOMBINANT DNA IM: CPT | Performed by: NURSE PRACTITIONER

## 2019-09-27 PROCEDURE — 99396 PREV VISIT EST AGE 40-64: CPT | Mod: 25 | Performed by: NURSE PRACTITIONER

## 2019-09-27 PROCEDURE — 90471 IMMUNIZATION ADMIN: CPT | Performed by: NURSE PRACTITIONER

## 2019-09-27 RX ORDER — BUPROPION HYDROCHLORIDE 150 MG/1
300 TABLET ORAL EVERY MORNING
Qty: 180 TABLET | Refills: 3 | Status: SHIPPED | OUTPATIENT
Start: 2019-09-27 | End: 2020-01-23

## 2019-09-27 RX ORDER — BUPROPION HYDROCHLORIDE 150 MG/1
150 TABLET ORAL EVERY MORNING
Qty: 90 TABLET | Refills: 3 | Status: SHIPPED | OUTPATIENT
Start: 2019-09-27 | End: 2019-09-27

## 2019-09-27 RX ORDER — CITALOPRAM HYDROBROMIDE 10 MG/1
10 TABLET ORAL DAILY
Qty: 90 TABLET | Refills: 3 | Status: SHIPPED | OUTPATIENT
Start: 2019-09-27 | End: 2020-09-18

## 2019-09-27 RX ORDER — TOPIRAMATE 25 MG/1
TABLET, FILM COATED ORAL
Qty: 318 TABLET | Refills: 0 | Status: SHIPPED | OUTPATIENT
Start: 2019-09-27 | End: 2020-01-23

## 2019-09-27 ASSESSMENT — ENCOUNTER SYMPTOMS
COUGH: 0
EYE PAIN: 0
PARESTHESIAS: 0
SORE THROAT: 0
WEAKNESS: 0
HEMATOCHEZIA: 0
SHORTNESS OF BREATH: 0
NAUSEA: 0
HEADACHES: 0
DIARRHEA: 0
CHILLS: 0
HEARTBURN: 0
DYSURIA: 0
NERVOUS/ANXIOUS: 0
ABDOMINAL PAIN: 0
ARTHRALGIAS: 0
PALPITATIONS: 0
JOINT SWELLING: 0
FREQUENCY: 0
FEVER: 0
MYALGIAS: 0
HEMATURIA: 0
CONSTIPATION: 0
BREAST MASS: 0
DIZZINESS: 0

## 2019-09-27 ASSESSMENT — MIFFLIN-ST. JEOR: SCORE: 1523.76

## 2019-09-27 NOTE — PROGRESS NOTES
SUBJECTIVE:   CC: Laya Zuleta is an 49 year old woman who presents for preventive health visit.     Healthy Habits:     Getting at least 3 servings of Calcium per day:  Yes    Bi-annual eye exam:  Yes    Dental care twice a year:  Yes    Sleep apnea or symptoms of sleep apnea:  None    Diet:  Regular (no restrictions)    Frequency of exercise:  2-3 days/week    Duration of exercise:  45-60 minutes    Taking medications regularly:  Yes    Medication side effects:  None    PHQ-2 Total Score: 0    Additional concerns today:  No    Concerns today: asking about shingles vaccine    -------------------------------------    Today's PHQ-2 Score:   PHQ-2 ( 1999 [employer]) 9/27/2019   Q1: Little interest or pleasure in doing things 0   Q2: Feeling down, depressed or hopeless 0   PHQ-2 Score 0   Q1: Little interest or pleasure in doing things Not at all   Q2: Feeling down, depressed or hopeless Not at all   PHQ-2 Score 0     Abuse: Current or Past(Physical, Sexual or Emotional)- No  Do you feel safe in your environment? Yes    Social History     Tobacco Use     Smoking status: Never Smoker     Smokeless tobacco: Never Used   Substance Use Topics     Alcohol use: Yes     Comment: 2 times per week, 2 per time       Alcohol Use 9/27/2019   Prescreen: >3 drinks/day or >7 drinks/week? No   Prescreen: >3 drinks/day or >7 drinks/week? -     Reviewed orders with patient.  Reviewed health maintenance and updated orders accordingly - Yes  Lab work is in process    Mammogram Screening: Patient over age 50, mutual decision to screen reflected in health maintenance.    Pertinent mammograms are reviewed under the imaging tab.  History of abnormal Pap smear: NO - age 30-65 PAP every 5 years with negative HPV co-testing recommended  PAP / HPV Latest Ref Rng & Units 7/13/2017 6/12/2014   PAP - NIL NIL   HPV 16 DNA NEG Negative -   HPV 18 DNA NEG Negative -   OTHER HR HPV NEG Negative -     Reviewed and updated as needed this visit by  "clinical staff  Tobacco  Allergies  Med Hx  Surg Hx  Fam Hx  Soc Hx        Reviewed and updated as needed this visit by Provider            Review of Systems   Constitutional: Negative for chills and fever.   HENT: Negative for congestion, ear pain, hearing loss and sore throat.    Eyes: Negative for pain and visual disturbance.   Respiratory: Negative for cough and shortness of breath.    Cardiovascular: Negative for chest pain, palpitations and peripheral edema.   Gastrointestinal: Negative for abdominal pain, constipation, diarrhea, heartburn, hematochezia and nausea.   Breasts:  Negative for tenderness, breast mass and discharge.   Genitourinary: Negative for dysuria, frequency, genital sores, hematuria, pelvic pain, urgency, vaginal bleeding and vaginal discharge.   Musculoskeletal: Negative for arthralgias, joint swelling and myalgias.   Skin: Negative for rash.   Neurological: Negative for dizziness, weakness, headaches and paresthesias.   Psychiatric/Behavioral: Negative for mood changes. The patient is not nervous/anxious.         OBJECTIVE:   /68 (BP Location: Right arm, Cuff Size: Adult Large)   Pulse 88   Temp 97.6  F (36.4  C) (Tympanic)   Ht 1.461 m (4' 9.5\")   Wt 86.3 kg (190 lb 4.8 oz)   SpO2 98%   BMI 40.47 kg/m    Physical Exam  GENERAL: healthy, alert and no distress  EYES: Eyes grossly normal to inspection, PERRL and conjunctivae and sclerae normal  HENT: ear canals and TM's normal, nose and mouth without ulcers or lesions  NECK: no adenopathy, no asymmetry, masses, or scars and thyroid normal to palpation  RESP: lungs clear to auscultation - no rales, rhonchi or wheezes  BREAST: normal without masses, tenderness or nipple discharge and no palpable axillary masses or adenopathy  CV: regular rate and rhythm, normal S1 S2, no S3 or S4, no murmur, click or rub, no peripheral edema and peripheral pulses strong  ABDOMEN: soft, nontender, no hepatosplenomegaly, no masses and bowel " "sounds normal  MS: no gross musculoskeletal defects noted, no edema  SKIN: no suspicious lesions or rashes  NEURO: Normal strength and tone, mentation intact and speech normal  PSYCH: mentation appears normal, affect normal/bright    Diagnostic Test Results:  Labs reviewed in Epic    ASSESSMENT/PLAN:   1. Routine general medical examination at a health care facility  - C RIV4 (FLUBLOK) VACCINE RECOMBINANT DNA PRSRV ANTIBIO FREE, IM [[id number]]  - HIV Screening; Future  - ADMIN 1st VACCINE  - Glucose; Future  - Lipid panel reflex to direct LDL Fasting; Future    2. PMDD (premenstrual dysphoric disorder)  Stable.   -She wishes to wean. Discussed weaning off Celexa first and how to do so.   - citalopram (CELEXA) 10 MG tablet; Take 1 tablet (10 mg) by mouth daily  Dispense: 90 tablet; Refill: 3  - buPROPion (WELLBUTRIN XL) 150 MG 24 hr tablet; Take 2 tablets (300 mg) by mouth every morning  Dispense: 180 tablet; Refill: 3    3. Weight gain  Reviewed options. Feels she has trouble controlling her snacking  -Discussed making smart choices at the grocery store. Don't keep snack food in the hosue  -Increase healthy fat and protein, decrease carbs.   - topiramate (TOPAMAX) 25 MG tablet; Take 1 tablet (25 mg) by mouth daily for 7 days, THEN 2 tablets (50 mg) daily for 7 days, THEN 3 tablets (75 mg) daily for 7 days, THEN 4 tablets (100 mg) daily.  Dispense: 318 tablet; Refill: 0  -Reviewed r/b/se and off label use  -F/U 3 months for weight check and to discuss dietary changes.     COUNSELING:  Reviewed preventive health counseling, as reflected in patient instructions    Estimated body mass index is 40.47 kg/m  as calculated from the following:    Height as of this encounter: 1.461 m (4' 9.5\").    Weight as of this encounter: 86.3 kg (190 lb 4.8 oz).    Weight management plan: as above     reports that she has never smoked. She has never used smokeless tobacco.      Counseling Resources:  ATP IV Guidelines  Pooled Cohorts " Equation Calculator  Breast Cancer Risk Calculator  FRAX Risk Assessment  ICSI Preventive Guidelines  Dietary Guidelines for Americans, 2010  USDA's MyPlate  ASA Prophylaxis  Lung CA Screening    Suzanna Smith, APRN CNP  [facility]

## 2019-09-27 NOTE — PATIENT INSTRUCTIONS
Take 1/2 tab celexa for a week then stop.  After 3 weeks increase Wellbutrin.  After 3 weeks start adding topiramate  Reduce processed carbs, potatoes, etc.     Preventive Health Recommendations  Female Ages 50 - 64    Yearly exam: See your health care provider every year in order to  o Review health changes.   o Discuss preventive care.    o Review your medicines if your doctor has prescribed any.      Get a Pap test every three years (unless you have an abnormal result and your provider advises testing more often).    If you get Pap tests with HPV test, you only need to test every 5 years, unless you have an abnormal result.     You do not need a Pap test if your uterus was removed (hysterectomy) and you have not had cancer.    You should be tested each year for STDs (sexually transmitted diseases) if you're at risk.     Have a mammogram every 1 to 2 years.    Have a colonoscopy at age 50, or have a yearly FIT test (stool test). These exams screen for colon cancer.      Have a cholesterol test every 5 years, or more often if advised.    Have a diabetes test (fasting glucose) every three years. If you are at risk for diabetes, you should have this test more often.     If you are at risk for osteoporosis (brittle bone disease), think about having a bone density scan (DEXA).    Shots: Get a flu shot each year. Get a tetanus shot every 10 years.    Nutrition:     Eat at least 5 servings of fruits and vegetables each day.    Eat whole-grain bread, whole-wheat pasta and brown rice instead of white grains and rice.    Get adequate Calcium and Vitamin D.     Lifestyle    Exercise at least 150 minutes a week (30 minutes a day, 5 days a week). This will help you control your weight and prevent disease.    Limit alcohol to one drink per day.    No smoking.     Wear sunscreen to prevent skin cancer.     See your dentist every six months for an exam and cleaning.    See your eye doctor every 1 to 2 years.

## 2019-09-30 VITALS
TEMPERATURE: 97.6 F | SYSTOLIC BLOOD PRESSURE: 112 MMHG | HEIGHT: 68 IN | WEIGHT: 190.3 LBS | DIASTOLIC BLOOD PRESSURE: 68 MMHG | OXYGEN SATURATION: 98 % | BODY MASS INDEX: 28.84 KG/M2 | HEART RATE: 88 BPM

## 2019-12-02 ENCOUNTER — OFFICE VISIT (OUTPATIENT)
Dept: BEHAVIORAL HEALTH | Facility: CLINIC | Age: 49
End: 2019-12-02
Attending: NURSE PRACTITIONER
Payer: COMMERCIAL

## 2019-12-02 DIAGNOSIS — F43.22 ADJUSTMENT DISORDER WITH ANXIOUS MOOD: Primary | ICD-10-CM

## 2019-12-02 PROCEDURE — 90791 PSYCH DIAGNOSTIC EVALUATION: CPT | Performed by: COUNSELOR

## 2019-12-02 ASSESSMENT — ANXIETY QUESTIONNAIRES
7. FEELING AFRAID AS IF SOMETHING AWFUL MIGHT HAPPEN: NOT AT ALL
2. NOT BEING ABLE TO STOP OR CONTROL WORRYING: NOT AT ALL
GAD7 TOTAL SCORE: 0
6. BECOMING EASILY ANNOYED OR IRRITABLE: NOT AT ALL
3. WORRYING TOO MUCH ABOUT DIFFERENT THINGS: NOT AT ALL
1. FEELING NERVOUS, ANXIOUS, OR ON EDGE: NOT AT ALL
5. BEING SO RESTLESS THAT IT IS HARD TO SIT STILL: NOT AT ALL

## 2019-12-02 ASSESSMENT — PATIENT HEALTH QUESTIONNAIRE - PHQ9
5. POOR APPETITE OR OVEREATING: NOT AT ALL
SUM OF ALL RESPONSES TO PHQ QUESTIONS 1-9: 0

## 2019-12-02 ASSESSMENT — COLUMBIA-SUICIDE SEVERITY RATING SCALE - C-SSRS
ATTEMPT PAST THREE MONTHS: NO
ATTEMPT LIFETIME: NO
1. IN THE PAST MONTH, HAVE YOU WISHED YOU WERE DEAD OR WISHED YOU COULD GO TO SLEEP AND NOT WAKE UP?: NO
1. IN THE PAST MONTH, HAVE YOU WISHED YOU WERE DEAD OR WISHED YOU COULD GO TO SLEEP AND NOT WAKE UP?: NO

## 2019-12-02 NOTE — Clinical Note
Ish Smith-Today I saw Laya for an initial therapy intake session. Laya presented with Adjustment Disorder with anxiety symptoms. Laya presented as very guarded during the intake. She has stated she would like to engage in therapy and we scheduled session for every other week. I look forward to collaborating as necessary. Thank you!

## 2019-12-02 NOTE — PROGRESS NOTES
Adult Intake Structured Interview  Standard Diagnostic Assessment      CLIENT'S NAME: Laya Zuleta  MRN:   [id number]  :   1970  ACCT. NUMBER: [id number]  DATE OF SERVICE: 19  VIDEO VISIT: No    Identifying Information:  Client is a 49 year old, ,  female. Client was referred for counseling by Dr. Smith at [facility] . Client is currently employed full time and reports she is able to function appropriately at work. Client reported she is a . Client attended the session alone. Client reported she resides with her  and daughter in [place], [place].       Client's Statement of Presenting Concern:  Client reports the reason for seeking therapy at this time as worry about her daughter who has been diagnosed with an illness. Client reported her daughter has been diagnosed with a bone disease. Client reported she become concerned two years ago with her daughters health but just recently they have found out about the diagnosis. Client reported the most recent and accurate diagnosis was given in August of this year. Client reported that they were concerned about Autism but went for an evaluation at the [facility] and they did not find evidence of this. Client reported that she gets stressed out. Client reported her daughter does not have a lot of interest in peer relationships. Client reported that her daughter has difficulty with participating in physical activity due to her bone issues as well.  Client stated that her symptoms have resulted in the following functional impairments: childcare / parenting, home life with family  and self-care. Client reported the symptoms impact parent via her patience level. Client reported that the symptoms impact home life as she and her  have differing opinions on  parenting. Client reported her symptoms impact self care as she feels she does not have time to practice these things as she is often caring for her daughter.       History of Presenting Concern:  Client reports that these problem(s) began in childhood dealing with parents divorce. Client reported dealing with family issues has impacted her and she wanted to talk with someone about that. Client reported she started taking medications due to symptoms during menstruation and found it was very helpful. Client has attempted to resolve these concerns in the past through counseling and medications, social, exercise . Client reports that other professional(s) are involved in providing support / services: PCP.       Social History:  Client reported she grew up in [place], [place]. Client reported she went to high school in [place] due to her mother living there at the time.  They were the second born of 2 children. Client reported an older sister. Client reported she has a step sister as well but they are not close.  Parents  41 years ago when the client was 8 years old. The client's mother did remarry 40 years ago The client's father did remarry 40 years ago. Client reported that her childhood was okay not the best. Client described her current relationships with family of origin as pretty close with her older sister. Client reported they see one another once a month and talk weekly.  Client reported that she was close with her father until he passed away two years ago.Client reported she is close with her step mother who was her fathers wife. Client reported she is not very close with her mother who lives in [place]. Client reported she will talk to her mother once a month and she has not seen her for a couple years.     Client reported a history of 1  marriage. Client has been  for 13 years. Client reported that her relationship with her  is pretty good and they have had ups and  downs.  Client reported having 1 children. Client identified some stable and meaningful social connections. Client reported she gets together with her friends once a month and then with the whole group a couple times a year. Client reported that she has friends from work and another she has been friends with for many years. Client reported she tries to see her friends about once a week. Client reported she is more social than her . Client reported when they moved to [place] from [place] it was more difficult to see friends for him. They moved to [place] 7 years ago. Client reported that she has not been involved with the legal system.  Client's highest education level was graduate school. Client did not identify any learning problems. There are no ethnic, cultural or Advent factors that may be relevant for therapy. Client identified her preferred language to be English. Client reported she does not need the assistance of an  or other support involved in therapy. Modifications will not be used to assist communication in therapy. Client did not serve in the .     Client reports family history includes Cancer in her paternal grandmother; Cancer (age of onset: 23) in her sister; Cancer (age of onset: 44) in her sister; Colon Cancer in her father; Family History Negative in her daughter.    Mental Health History:  Client reported no family history of mental health issues.  Client has not been previously diagnosed with a mental health diagnosis.  Client has received the following mental health services in the past: counseling and medication(s) from physician / PCP.  Hospitalizations: None.  Client is currently receiving the following services: medication(s) from physician / PCP.      Chemical Health History:  Client reported no family history of chemical health issues. Client has not received chemical dependency treatment in the past. Client is not currently receiving any chemical dependency  treatment. Client reports no problems as a result of their drinking / drug use.      Client Reports:  Client reports using alcohol 2 times per week and has 2 glasses of wine at a time. Patient first started drinking at age 15.  Patient reported date of last use was  .  Patient reports heaviest use is current use.  Client denies using tobacco.  Client denies using marijuana.  Client reports using caffeine 1 times per day and drinks 2 at a time. Patient started using caffeine at age childhood.  Client denies using street drugs.  Client denies the non-medical use of prescription or over the counter drugs.    CAGE: None of the patient's responses to the CAGE screening were positive / Negative CAGE score   Based on the negative Cage-Aid score and clinical interview there  are not indications of drug or alcohol abuse.    Discussed the general effects of drugs and alcohol on health and well-being. Therapist gave client printed information about the effects of chemical use on her health and well being.      Significant Losses / Trauma / Abuse / Neglect Issues:  There are indications or report of significant loss, trauma, abuse or neglect issues related to: death of father in 2017  and divorce / relational changes parents divorce when she was 8.    Issues of possible neglect are not present.      Medical Issues:  Client has had a physical exam to rule out medical causes for current symptoms. Date of last physical exam was within the past year. Client was encouraged to follow up with PCP if symptoms were to develop. The client has a [facility] Provider, who is named Suzanna Smith.. The client reports not having a psychiatrist. Client reports no current medical concerns. The client denies the presence of chronic or episodic pain. There are not significant nutritional concerns.     Patient reports current meds as:   Outpatient Medications Marked as Taking for the 12/2/19 encounter (Office Visit) with  Maritza Milton, [facility]   Medication Sig     buPROPion (WELLBUTRIN XL) 150 MG 24 hr tablet Take 2 tablets (300 mg) by mouth every morning     citalopram (CELEXA) 10 MG tablet Take 1 tablet (10 mg) by mouth daily       Client Allergies:  No Known Allergies  no allergies to medications    Medical History:  Past Medical History:   Diagnosis Date     Acne 11/29/2010     CARDIOVASCULAR SCREENING; LDL GOAL LESS THAN 160 11/29/2010         Medication Adherence:  Client reports taking prescribed medications as prescribed.    Client was provided recommendation to follow-up with prescribing physician.    Mental Status Assessment:  Appearance:   Appropriate   Eye Contact:   Good   Psychomotor Behavior: Normal   Attitude:   Guarded   Orientation:   All  Speech   Rate / Production: Normal    Volume:  Normal   Mood:    Irritable  Normal  Affect:    Flat   Thought Content:  Clear   Thought Form:  Coherent  Logical   Insight:    Fair       Review of Symptoms:  Depression: Irritability  Rebecca:  No symptoms  Psychosis: No symptoms  Anxiety: Worries irritability   Panic:  No symptoms  Post Traumatic Stress Disorder: No symptoms  Obsessive Compulsive Disorder: No symptoms  Eating Disorder: No symptoms  Oppositional Defiant Disorder: No symptoms  ADD / ADHD: No symptoms  Conduct Disorder: No symptoms      Safety Assessment:    History of Safety Concerns:   Client denied a history of suicidal ideation.    Client denied a history of suicide attempts.    Client denied a history of homicidal ideation.    Client denied a history of self-injurious ideation and behaviors.    Client denied a history of personal safety concerns.    Client denied a history of assaultive behaviors.        Current Safety Concerns:  Client denies current suicidal ideation.    Client denies current homicidal ideation and behaviors.  Client denies current self-injurious ideation and behaviors.    Client denies current concerns for personal safety.    Client reports  the following protective factors: positive relationships positive social network, sober network and positive family connections, forward/future oriented thinking, dedication to family/friends, safe and stable environment, regular sleep, effectively controls impulses, sense of belonging  , abstinence from substances, agreement to use safety plan, living with other people, daily obligations, structured day, effective problem-solving skills, committment to well-being, positive social skills, financial stability, strong sense of self-worth/esteem, sense of personal control or determination, access to a variety of clinical interventions and pets    Client reports there are firearms in the house. The firearms are secured in a locked space.     Plan for Safety and Risk Management:  Recommended that patient call 911 or go to the local ED should there be a change in any of these risk factors.    Client's Strengths and Limitations:  Client identified the following strengths or resources that will help her succeed in counseling: friends / good social support, family support, insight, intelligence, positive work environment, motivation, strong social skills and work ethic. Client identified the following supports: family and friends. Things that may interfere with the client's success in counseling include: none reported .      Diagnostic Criteria:  A. The development of emotional or behavioral symptoms in response to an identifiable stressor(s) occurring within 3 months of the onset of the stressor(s)  B. These symptoms or behaviors are clinically significant, as evidenced by one or both of the following:       - Marked distress that is out of proportion to the severity/intensity of the stressor (with consideration for external context & culture)       - Significant impairment in social, occupational, or other important areas of functioning  C. The stress-related disturbance does not meet criteria for another disorder & is not  not an exacerbation of another mental disorder  D. The symptoms do not represent normal bereavement  E. Once the stressor or its consequences have terminated, the symptoms do not persist for more than an additional 6 months       * Adjustment Disorder with Anxiety: The predominant manfestations are symptoms such as nervousness, worry, or jitteriness, or, in children separation anxiety from major attachment figures      Functional Status:  Client's symptoms are causing reduced functional status in the following areas: Activities of Daily Living - limited time due to care taking for daughter   Social / Relational - relationship with        DSM5 Diagnoses: (Sustained by DSM5 Criteria Listed Above)  Diagnoses: Adjustment Disorders  309.24 (F43.22) With anxiety  Psychosocial & Contextual Factors: daughters health, marriage, parenting   WHODAS 2.0 (12 item)            This questionnaire asks about difficulties due to health conditions. Health conditions  include  disease or illnesses, other health problems that may be short or long lasting,  injuries, mental health or emotional problems, and problems with alcohol or drugs.                     Think back over the past 30 days and answer these questions, thinking about how much  difficulty you had doing the following activities. For each question, please [place] only  one response.    S1 Standing for long periods such as 30 minutes? None =         1   S2 Taking care of household responsibilities? None =         1   S3 Learning a new task, for example, learning how to get to a new place? None =         1   S4 How much of a problem do you have joining community activities (for example, festivals, Hindu or other activities) in the same way as anyone else can? None =         1   S5 How much have you been emotionally affected by your health problems? Mild =           2     In the past 30 days, how much difficulty did you have in:   S6 Concentrating on doing something  for ten minutes? None =         1   S7 Walking a long distance such as a kilometer (or equivalent)? None =         1   S8 Washing your whole body? None =         1   S9 Getting dressed? None =         1   S10 Dealing with people you do not know? None =         1   S11 Maintaining a friendship? None =         1   S12 Your day to day work? None =         1     H1 Overall, in the past 30 days, how many days were these difficulties present? Record number of days 0   H2 In the past 30 days, for how many days were you totally unable to carry out your usual activities or work because of any health condition? Record number of days  0   H3 In the past 30 days, not counting the days that you were totally unable, for how many days did you cut back or reduce your usual activities or work because of any health condition? Record number of days 0     Attendance Agreement:  Client has signed Attendance Agreement:Yes      Collaboration:  Collaboration / coordination of treatment will be initiated with the following support professionals: primary care physician.      Preliminary Treatment Plan:  The client reports no currently identified Yazidism, ethnic or cultural issues relevant to therapy.     services are not indicated.    Modifications to assist communication are not indicated.    The concerns identified by the client will be addressed in therapy.    Initial Treatment will focus on: Adjustment Difficulties related to: family concerns.    As a preliminary treatment goal, client will develop coping/problem-solving skills to facilitate more adaptive adjustment.    The focus of initial interventions will be to alleviate anxiety, facilitate appropriate expression of feelings, increase ability to function adaptively, increase coping skills, provide homework to reinforce skill development, provide psychoeduction regarding adjustment, teach CBT skills, teach communication skills, teach emotional regulation, teach  mindfulness skills, teach relaxation strategies and teach stress mangement techniques.    Referral to another professional/service is not indicated at this time..    A Release of Information is not needed at this time.    Report to child / adult protection services was NA.    Patient will have open access to their mental health medical record.    Maritza Milton, [facility]  December 2, 2019

## 2019-12-03 ASSESSMENT — ANXIETY QUESTIONNAIRES: GAD7 TOTAL SCORE: 0

## 2020-01-10 ENCOUNTER — OFFICE VISIT (OUTPATIENT)
Dept: PSYCHOLOGY | Facility: CLINIC | Age: 50
End: 2020-01-10
Payer: COMMERCIAL

## 2020-01-10 DIAGNOSIS — F43.22 ADJUSTMENT DISORDER WITH ANXIETY: Primary | ICD-10-CM

## 2020-01-10 PROCEDURE — 90834 PSYTX W PT 45 MINUTES: CPT | Performed by: MARRIAGE & FAMILY THERAPIST

## 2020-01-10 NOTE — PROGRESS NOTES
Progress Note    Patient Name: Laya Zuleta  Date: January 10, 2020          Service Type: Individual  Video Visit: No     Session Start Time: 11:30  Session End Time: 12:15     Session Length: 45 min    Session #: 2 (1 with this writer)    Attendees: Client attended alone     Treatment Plan Last Reviewed: pending  PHQ-9 / ZAIRA-7: assessed regularly see flow sheets    DATA  Interactive Complexity: No  Crisis: No       Progress Since Last Session (Related to Symptoms / Goals / Homework):   Symptoms: No change .    Homework: n/a      Episode of Care Goals: Satisfactory progress - PREPARATION (Decided to change - considering how); Intervened by negotiating a change plan and determining options / strategies for behavior change, identifying triggers, exploring social supports, and working towards setting a date to begin behavior change     Current / Ongoing Stressors and Concerns:   Daughter's health   Relationship   parenting      Treatment Objective(s) Addressed in This Session:   Client interested in cbt interventions to reduce symptoms.     Intervention:   Completed diagnostic interview and discussed treatment options.         ASSESSMENT: Current Emotional / Mental Status (status of significant symptoms):   Risk status (Self / Other harm or suicidal ideation)   Patient denies current fears or concerns for personal safety.   Patient denies current or recent suicidal ideation or behaviors.   Patientdenies current or recent homicidal ideation or behaviors.   Patient denies current or recent self injurious behavior or ideation.   Patient denies other safety concerns.   Patient reports there has been no change in risk factors since their last session.     Patientreports there has been no change in protective factors since their last session.     Recommended that patient call 911 or go to the local ED should there be a change in any of these risk  factors.     Appearance:   Appropriate    Eye Contact:   Good    Psychomotor Behavior: Normal    Attitude:   Cooperative    Orientation:   All   Speech    Rate / Production: Normal     Volume:  Normal    Mood:    Anxious    Affect:    Appropriate    Thought Content:  Clear    Thought Form:  Coherent  Logical    Insight:    Good      Medication Review:   No changes to current psychiatric medication(s)     Medication Compliance:   Yes     Changes in Health Issues:   None reported     Chemical Use Review:   Substance Use: Chemical use reviewed, no active concerns identified      Tobacco Use: No current tobacco use.      Diagnosis:  Adjustment disorder with anxiety    Collateral Reports Completed:   Not Applicable    PLAN: (Patient Tasks / Therapist Tasks / Other)  Client will return to finalize treatment plan and begin interventions to reduce symptoms. Client will focus on behavioral activation through self-care.    Chung Martinez MA, [facility]                                                         ______________________________________________________________________

## 2020-01-23 ENCOUNTER — OFFICE VISIT (OUTPATIENT)
Dept: PEDIATRICS | Facility: CLINIC | Age: 50
End: 2020-01-23
Payer: COMMERCIAL

## 2020-01-23 VITALS
TEMPERATURE: 97.9 F | HEART RATE: 74 BPM | DIASTOLIC BLOOD PRESSURE: 72 MMHG | SYSTOLIC BLOOD PRESSURE: 108 MMHG | WEIGHT: 189.1 LBS | OXYGEN SATURATION: 100 % | HEIGHT: 68 IN | BODY MASS INDEX: 28.66 KG/M2

## 2020-01-23 DIAGNOSIS — Z01.818 PREOP GENERAL PHYSICAL EXAM: Primary | ICD-10-CM

## 2020-01-23 DIAGNOSIS — H26.9 CATARACT, UNSPECIFIED CATARACT TYPE, UNSPECIFIED LATERALITY: ICD-10-CM

## 2020-01-23 DIAGNOSIS — F32.81 PMDD (PREMENSTRUAL DYSPHORIC DISORDER): ICD-10-CM

## 2020-01-23 DIAGNOSIS — R63.5 WEIGHT GAIN: ICD-10-CM

## 2020-01-23 PROCEDURE — 99214 OFFICE O/P EST MOD 30 MIN: CPT | Mod: GC | Performed by: STUDENT IN AN ORGANIZED HEALTH CARE EDUCATION/TRAINING PROGRAM

## 2020-01-23 RX ORDER — BUPROPION HYDROCHLORIDE 150 MG/1
150 TABLET ORAL EVERY MORNING
Qty: 180 TABLET | Refills: 3
Start: 2020-01-23 | End: 2020-09-18

## 2020-01-23 ASSESSMENT — MIFFLIN-ST. JEOR: SCORE: 1518.31

## 2020-01-23 NOTE — PROGRESS NOTES
[facility]  [address]  [address]  [place] [address] [phone]  Dept: [phone]    PRE-OP EVALUATION:  Today's date: 2020    Laya Zuleta (: 1970) presents for pre-operative evaluation assessment as requested by Dr. Tonio Del Rio ([facility]). She requires evaluation and anesthesia risk assessment prior to undergoing surgery/procedure for treatment of Capsulotomy Right Eye.    Fax number for surgical facility: [phone]  Primary Physician: Suzanna Smith  Type of Anesthesia Anticipated: General    Patient has a Health Care Directive or Living Will:  YES     Preop Questions 2020   Who is doing your surgery? [facility]   What are you having done? capsulotomy right eye   Date of Surgery/Procedure: 2020    Facility or Hospital where procedure/surgery will be performed: [facility]   1.  Do you have a history of Heart attack, stroke, stent, coronary bypass surgery, or other heart surgery? No   2.  Do you ever have any pain or discomfort in your chest? No   3.  Do you have a history of  Heart Failure? No   4.   Are you troubled by shortness of breath when:  walking on a level surface, or up a slight hill, or at night? No   5.  Do you currently have a cold, bronchitis or other respiratory infection? No   6.  Do you have a cough, shortness of breath, or wheezing? No   7.  Do you sometimes get pains in the calves of your legs when you walk? No   8. Do you or anyone in your family have previous history of blood clots? No   9.  Do you or does anyone in your family have a serious bleeding problem such as prolonged bleeding following surgeries or cuts? No   10. Have you ever had problems with anemia or been told to take iron pills? No   11. Have you had any abnormal blood loss such as black, tarry or bloody stools, or abnormal vaginal bleeding? No   12. Have you ever had a blood  transfusion? No   13. Have you or any of your relatives ever had problems with anesthesia? No   14. Do you have sleep apnea, excessive snoring or daytime drowsiness? No   15. Do you have any prosthetic heart valves? No   16. Do you have prosthetic joints? No   17. Is there any chance that you may be pregnant? No     HPI:     HPI related to upcoming procedure:    - Morning of 2/13/2020  - Right cataract surgery    - Presumably had artifical lens placed in right eye 2-3 years ago  - No problems with anesthesia at that time    - 1.5 years ago started to develop cloudy vision out of right eye  - Cloudiness has gradually worsened since that time  - Patient denies any vision problems with left eye.    - Appendectomy in 1994  - Sinus surgery in 2010    MEDICAL HISTORY:     Patient Active Problem List    Diagnosis Date Noted     Family history of breast cancer 04/03/2015     Priority: Medium     CARDIOVASCULAR SCREENING; LDL GOAL LESS THAN 160 11/29/2010     Priority: Medium     Chronic maxillary sinusitis 11/29/2010     Priority: Medium     Acne 11/29/2010     Priority: Medium      Past Medical History:   Diagnosis Date     Acne 11/29/2010     CARDIOVASCULAR SCREENING; LDL GOAL LESS THAN 160 11/29/2010     Past Surgical History:   Procedure Laterality Date     COLONOSCOPY N/A 11/19/2018    Procedure: Colonoscopy;  Surgeon: Stacia Cm MD;  Location:  GI     LAPAROSCOPIC APPENDECTOMY CHILD      1994     SINUS SURGERY      1/10     Current Outpatient Medications   Medication Sig Dispense Refill     Calcium Carbonate-Vitamin D (CALCIUM 600 + D) 600-400 MG-UNIT tablet Take 1 tablet by mouth 2 times daily. 100 tablet 3     citalopram (CELEXA) 10 MG tablet Take 1 tablet (10 mg) by mouth daily 90 tablet 3     SPIRONOLACTONE PO Take 75 mg by mouth       buPROPion (WELLBUTRIN XL) 150 MG 24 hr tablet Take 2 tablets (300 mg) by mouth every morning 180 tablet 3     OTC products: None, except as noted above    No Known  "Allergies   Latex Allergy: NO    Social History     Tobacco Use     Smoking status: Never Smoker     Smokeless tobacco: Never Used   Substance Use Topics     Alcohol use: Yes     Comment: 2 times per week, 2 per time     History   Drug Use No     Social history: Works as . Lives at home with  and 13-year-old daughter.    REVIEW OF SYSTEMS:   Constitutional, neuro, ENT, endocrine, pulmonary, cardiac, gastrointestinal, genitourinary, musculoskeletal, integument and psychiatric systems are negative, except as otherwise noted.    EXAM:   /72 (BP Location: Right arm, Patient Position: Sitting, Cuff Size: Adult Large)   Pulse 74   Temp 97.9  F (36.6  C) (Oral)   Ht 1.715 m (5' 7.5\")   Wt 85.8 kg (189 lb 1.6 oz)   SpO2 100%   BMI 29.18 kg/m          GENERAL APPEARANCE: healthy, alert, no distress; overweight     EYES: EOEMi, pupils equally round     HENT: ear canals normal and nose and mouth without ulcers or lesions     NECK: no adenopathy, no asymmetry, masses, or scars and thyroid normal to palpation     RESP: lungs clear to auscultation - no rales, rhonchi or wheezes     CV: regular rates and rhythm, normal S1 S2, no S3 or S4 and no murmur, click or rub     ABDOMEN: overweight, soft, non-tender, no hepatosplenomegaly or masses and bowel sounds normal     MS: extremities normal - no gross deformities noted, no evidence of inflammation in joints     SKIN: no suspicious lesions or rashes     NEURO: Normal gait, mentation intact, speech normal     PSYCH: mentation appears normal, affect normal/bright     LYMPHATICS: No cervical adenopathy    DIAGNOSTICS:   No labs or EKG required for low risk surgery (cataract, skin procedure, breast biopsy, etc)    Recent Labs   Lab Test 09/17/18  1035 02/04/16  1118 05/13/13  0916 01/15/13  1500   HGB 14.1 14.8  --   --      --   --   --    POTASSIUM  --   --  4.4 4.1     IMPRESSION:   Reason for surgery/procedure: Right eye " cataract  Diagnosis/reason for consult: Pre-operative clearance    The proposed surgical procedure is considered LOW risk.    REVISED CARDIAC RISK INDEX  The patient has the following serious cardiovascular risks for perioperative complications such as (MI, PE, VFib and 3  AV Block):  No serious cardiac risks  INTERPRETATION: 0 risks: Class I (very low risk - 0.4% complication rate)    The patient has the following additional risks for perioperative complications:  No identified additional risks      ICD-10-CM    1. Preop general physical exam Z01.818      RECOMMENDATIONS:     -Patient is to take all scheduled medications on the day of surgery.    APPROVAL GIVEN to proceed with proposed procedure, without further diagnostic evaluation     Signed Electronically by: Jethro Minor MD    Copy of this evaluation report is provided to requesting physician.    [place] Preop Guidelines    Revised Cardiac Risk Index    Jethro Minor MD  PGY-2 Internal Medicine/Pediatrics  Pager [phone]  Thursday 01/23/2020    Patient staffed with the attending physician, Dr. Marck Robin  I have seen this patient and examined him in the presence of Dr. Minor.  I was present during the key components of the presenting complaints, physical exam, diagnosis, and plan, and fully concur with the plan as listed in the resident's note.    Marck Robin MD  Internal Medicine and Pediatrics

## 2020-01-23 NOTE — PATIENT INSTRUCTIONS
1. Do not eat or drink on the morning of surgery.  2. On the day of surgery, it is okay for you to take your normal medications with a small sip of water.    Before Your Surgery    Call your surgeon if there is any change in your health. This includes signs of a cold or flu (such as a sore throat, runny nose, cough, rash or fever).    Do not smoke, drink alcohol or take over the counter medicine (unless your surgeon or primary care doctor tells you to) for the 24 hours before and after surgery.    If you take prescribed drugs: Follow your doctor s orders about which medicines to take and which to stop until after surgery.    Eating and drinking prior to surgery: follow the instructions from your surgeon    Take a shower or bath the night before surgery. Use the soap your surgeon gave you to gently clean your skin. If you do not have soap from your surgeon, use your regular soap. Do not shave or scrub the surgery site.  Wear clean pajamas and have clean sheets on your bed.

## 2020-01-24 ENCOUNTER — OFFICE VISIT (OUTPATIENT)
Dept: PSYCHOLOGY | Facility: CLINIC | Age: 50
End: 2020-01-24
Payer: COMMERCIAL

## 2020-01-24 DIAGNOSIS — F43.22 ADJUSTMENT DISORDER WITH ANXIETY: Primary | ICD-10-CM

## 2020-01-24 PROCEDURE — 90834 PSYTX W PT 45 MINUTES: CPT | Performed by: MARRIAGE & FAMILY THERAPIST

## 2020-01-24 NOTE — PROGRESS NOTES
Progress Note    Patient Name: Laya Zuleta  Date: January 24, 2020          Service Type: Individual  Video Visit: No     Session Start Time: 9:30  Session End Time: 10:15     Session Length: 45 min    Session #: 3    Attendees: Client attended alone     Treatment Plan Last Reviewed: January 24, 2020   PHQ-9 / ZAIRA-7: assessed regularly see flow sheets    DATA  Interactive Complexity: No  Crisis: No       Progress Since Last Session (Related to Symptoms / Goals / Homework):   Symptoms: No change .    Homework: n/a      Episode of Care Goals: Satisfactory progress - PREPARATION (Decided to change - considering how); Intervened by negotiating a change plan and determining options / strategies for behavior change, identifying triggers, exploring social supports, and working towards setting a date to begin behavior change     Current / Ongoing Stressors and Concerns:   Daughter's health   Relationship   parenting      Treatment Objective(s) Addressed in This Session:   Client will engage in positive self-care.     Intervention:   Taught and established self-care goals to improve mood: nutrition, activity, socializing, sleep. Client reports her daughter is doing well and improving on new medication. She says she is concerned by the resentments she holds against others, how difficult it is for her to forgive and how this negative energy affects her mood. Discussed an example of a parent who took advantage of her generosity around her daughter's [employer]. Processed emotions in session, validated, supportive counseling.     ASSESSMENT: Current Emotional / Mental Status (status of significant symptoms):   Risk status (Self / Other harm or suicidal ideation)   Patient denies current fears or concerns for personal safety.   Patient denies current or recent suicidal ideation or behaviors.   Patientdenies current or recent homicidal ideation or behaviors.   Patient denies  current or recent self injurious behavior or ideation.   Patient denies other safety concerns.   Patient reports there has been no change in risk factors since their last session.     Patientreports there has been no change in protective factors since their last session.     Recommended that patient call 911 or go to the local ED should there be a change in any of these risk factors.     Appearance:   Appropriate    Eye Contact:   Good    Psychomotor Behavior: Normal    Attitude:   Cooperative    Orientation:   All   Speech    Rate / Production: Normal     Volume:  Normal    Mood:    Anxious    Affect:    Appropriate    Thought Content:  Clear    Thought Form:  Coherent  Logical    Insight:    Good      Medication Review:   No changes to current psychiatric medication(s)     Medication Compliance:   Yes     Changes in Health Issues:   None reported     Chemical Use Review:   Substance Use: Chemical use reviewed, no active concerns identified      Tobacco Use: No current tobacco use.      Diagnosis:  Adjustment disorder with anxiety    Collateral Reports Completed:   Not Applicable    PLAN: (Patient Tasks / Therapist Tasks / Other)  Client will achieve self-care goals to improveanxiiety.      Chung Martinez MA, [facility]                                                         ______________________________________________________________________                                                 Treatment Plan    Client's Name: Laya Zuleta  YOB: 1970    Date: January 24, 2020     DSM-V Diagnoses: 309.24 - Adjustment Disorder with Anxiety  ; V71.09 - No Diagnosis  Psychosocial / Contextual Factors: daughter with bone disease  WHODAS: 13    Referral / Collaboration:  Referral to another professional/service is not indicated at this time..    Anticipated number of session or this episode of care: ongoing    Measurable Treatment Goal(s) related to diagnosis / functional impairment(s)   I will know I've  met my goal when I learn tools to cope with and lower my anxiety.     Goal 1: Client will experience a significant reduction in ZAIRA-7 scores.     Objective #A   Client will learn and integrate DBT/CBT strategies to more effectively manage emotions and relationships.   Status: New - Date: January 24, 2020    Intervention(s)   Therapist will teach emotional regulation, distress tolerance, interpersonal effectiveness, and mindfulness skills. Meditation resources will be offered. Therapist will teach TIPP skills: Temperature, Intense exercise, Paced breathing, and Paired Muscle Relaxation.    Objective #B   Client will learn to identify negative thoughts that are present and use cbt strategies to diffuse anxiety.  Status: New - Date(s): January 24, 2020    Intervention(s)   Therapist will teach cognitive distortion identification and coping strategies.    Objective #C   Client will engage in positive self-care.  Status: New - Date: January 24, 2020    Intervention(s)   Therapist will teach self-care goals:  Maintain balance in schedule (time for self/others, relaxation/activities, leisure/tasks, home/out of the house), assert needs and set limits with others, challenge negative thoughts/use affirming and encouraging self-talk, engage with support people on regular basis, practice behavior activation behaviors (sleep hygiene, balanced eating, physical activity, medical needs, personal hygiene), acknowledge and accept your feelings.      Patient has reviewed and agreed to the above plan.    Rich Martinez MA, LMFT  January 24, 2020

## 2020-02-14 ENCOUNTER — OFFICE VISIT (OUTPATIENT)
Dept: PSYCHOLOGY | Facility: CLINIC | Age: 50
End: 2020-02-14
Payer: COMMERCIAL

## 2020-02-14 DIAGNOSIS — F43.22 ADJUSTMENT DISORDER WITH ANXIETY: Primary | ICD-10-CM

## 2020-02-14 PROCEDURE — 90834 PSYTX W PT 45 MINUTES: CPT | Performed by: MARRIAGE & FAMILY THERAPIST

## 2020-02-14 NOTE — PROGRESS NOTES
Progress Note    Patient Name: Laya Zuleta  Date: February 14, 2020        Service Type: Individual  Video Visit: No     Session Start Time: 9:30  Session End Time: 10:15     Session Length: 45 min    Session #: 4    Attendees: Client attended alone     Treatment Plan Last Reviewed: January 24, 2020   PHQ-9 / ZAIRA-7: assessed regularly see flow sheets    DATA  Interactive Complexity: No  Crisis: No       Progress Since Last Session (Related to Symptoms / Goals / Homework):   Symptoms: No change .    Homework: Achieved / completed to satisfaction with strong self-care performance.      Episode of Care Goals: Satisfactory progress - ACTION (Actively working towards change); Intervened by reinforcing change plan / affirming steps taken     Current / Ongoing Stressors and Concerns:   Daughter's bone diseasse   Relationship   parenting    Family of origin conflict     Treatment Objective(s) Addressed in This Session:   Client will learn to identify negative thoughts that are present and use cbt strategies to diffuse anxiety.     Intervention:   Taught cognitive distortion identification andd coping strategies. Client reports she had strong self-care performance, achieving goals regarding sleep, nutrition, social connections, and physical activity.She says she continues to be distressed over her daughter's bone disease, tough daughter seems to be doing well. She notes her worry about daughter's social connections, but was gratified to see her connect with friends when they attended school conferences this week. Processed emotions in session, validated, supportive counseling.     ASSESSMENT: Current Emotional / Mental Status (status of significant symptoms):   Risk status (Self / Other harm or suicidal ideation)   Patient denies current fears or concerns for personal safety.   Patient denies current or recent suicidal ideation or behaviors.   Patientdenies current or recent  homicidal ideation or behaviors.   Patient denies current or recent self injurious behavior or ideation.   Patient denies other safety concerns.   Patient reports there has been no change in risk factors since their last session.     Patientreports there has been no change in protective factors since their last session.     Recommended that patient call 911 or go to the local ED should there be a change in any of these risk factors.     Appearance:   Appropriate    Eye Contact:   Good    Psychomotor Behavior: Normal    Attitude:   Cooperative    Orientation:   All   Speech    Rate / Production: Normal     Volume:  Normal    Mood:    Anxious    Affect:    Appropriate    Thought Content:  Clear    Thought Form:  Coherent  Logical    Insight:    Good      Medication Review:   No changes to current psychiatric medication(s)     Medication Compliance:   Yes     Changes in Health Issues:   None reported     Chemical Use Review:   Substance Use: Chemical use reviewed, no active concerns identified      Tobacco Use: No current tobacco use.      Diagnosis:  Adjustment disorder with anxiety    Collateral Reports Completed:   Not Applicable    PLAN: (Patient Tasks / Therapist Tasks / Other)  Client will continue to achieve self-care goals to improve anxiiety.She will journal the cognitive distortions he notices, particularly catastrophizing.      Chung Martinez MA, LMFT                                                         ______________________________________________________________________                                                 Treatment Plan    Client's Name: Laya Zuleta  YOB: 1970    Date: January 24, 2020     DSM-V Diagnoses: 309.24 - Adjustment Disorder with Anxiety  ; V71.09 - No Diagnosis  Psychosocial / Contextual Factors: daughter with bone disease  WHODAS: 13    Referral / Collaboration:  Referral to another professional/service is not indicated at this time..    Anticipated  number of session or this episode of care: ongoing    Measurable Treatment Goal(s) related to diagnosis / functional impairment(s)   I will know I've met my goal when I learn tools to cope with and lower my anxiety.     Goal 1: Client will experience a significant reduction in ZAIRA-7 scores.     Objective #A   Client will learn and integrate DBT/CBT strategies to more effectively manage emotions and relationships.   Status: New - Date: January 24, 2020    Intervention(s)   Therapist will teach emotional regulation, distress tolerance, interpersonal effectiveness, and mindfulness skills. Meditation resources will be offered. Therapist will teach TIPP skills: Temperature, Intense exercise, Paced breathing, and Paired Muscle Relaxation.    Objective #B   Client will learn to identify negative thoughts that are present and use cbt strategies to diffuse anxiety.  Status: New - Date(s): January 24, 2020    Intervention(s)   Therapist will teach cognitive distortion identification and coping strategies.    Objective #C   Client will engage in positive self-care.  Status: New - Date: January 24, 2020    Intervention(s)   Therapist will teach self-care goals:  Maintain balance in schedule (time for self/others, relaxation/activities, leisure/tasks, home/out of the house), assert needs and set limits with others, challenge negative thoughts/use affirming and encouraging self-talk, engage with support people on regular basis, practice behavior activation behaviors (sleep hygiene, balanced eating, physical activity, medical needs, personal hygiene), acknowledge and accept your feelings.      Patient has reviewed and agreed to the above plan.    Rich Martinez MA, LMFT  January 24, 2020

## 2020-03-13 ENCOUNTER — OFFICE VISIT (OUTPATIENT)
Dept: PSYCHOLOGY | Facility: CLINIC | Age: 50
End: 2020-03-13
Payer: COMMERCIAL

## 2020-03-13 DIAGNOSIS — F43.22 ADJUSTMENT DISORDER WITH ANXIETY: Primary | ICD-10-CM

## 2020-03-13 PROCEDURE — 90834 PSYTX W PT 45 MINUTES: CPT | Performed by: MARRIAGE & FAMILY THERAPIST

## 2020-03-13 NOTE — PROGRESS NOTES
"                                           Progress Note    Patient Name: Laya Zuleta  Date: March 13, 2020         Service Type: Individual  Video Visit: No     Session Start Time: 12:30  Session End Time: 1:15     Session Length: 45 min    Session #: 5    Attendees: Client attended alone     Treatment Plan Last Reviewed: January 24, 2020   PHQ-9 / ZAIRA-7: assessed regularly see flow sheets    DATA  Interactive Complexity: No  Crisis: No       Progress Since Last Session (Related to Symptoms / Goals / Homework):   Symptoms: No change .    Homework: Achieved / completed to satisfaction with strong self-care performance.      Episode of Care Goals: Satisfactory progress - ACTION (Actively working towards change); Intervened by reinforcing change plan / affirming steps taken     Current / Ongoing Stressors and Concerns:   COVID-19 virus escalating   Daughter's bone diseasse   Relationship   parenting    Family of origin conflict     Treatment Objective(s) Addressed in This Session:   Client will learn to identify negative thoughts that are present and use cbt strategies to diffuse anxiety.     Intervention:   Taught cognitive distortion identification and coping strategies. Client reports she is noticing that under stress she has low self-esteem and \"beats myself up.\" Discussed the process of improving self-talk and self-judgement, starting with noticing exactly how she treats herself and talks to herself. Provided COVID-19 education and planning. Client reports anxiety about the pandemic. Distress tolerance skills taught. Processed emotions in session, validated, supportive counseling.     ASSESSMENT: Current Emotional / Mental Status (status of significant symptoms):   Risk status (Self / Other harm or suicidal ideation)   Patient denies current fears or concerns for personal safety.   Patient denies current or recent suicidal ideation or behaviors.   Patientdenies current or recent homicidal ideation or " behaviors.   Patient denies current or recent self injurious behavior or ideation.   Patient denies other safety concerns.   Patient reports there has been no change in risk factors since their last session.     Patientreports there has been no change in protective factors since their last session.     Recommended that patient call 911 or go to the local ED should there be a change in any of these risk factors.     Appearance:   Appropriate    Eye Contact:   Good    Psychomotor Behavior: Normal    Attitude:   Cooperative    Orientation:   All   Speech    Rate / Production: Normal     Volume:  Normal    Mood:    Anxious    Affect:    Appropriate    Thought Content:  Clear    Thought Form:  Coherent  Logical    Insight:    Good      Medication Review:   No changes to current psychiatric medication(s)     Medication Compliance:   Yes     Changes in Health Issues:   None reported     Chemical Use Review:   Substance Use: Chemical use reviewed, no active concerns identified      Tobacco Use: No current tobacco use.      Diagnosis:  Adjustment disorder with anxiety    Collateral Reports Completed:   Not Applicable    PLAN: (Patient Tasks / Therapist Tasks / Other)  Client will follow COVID 19 prevention protocols and notice her own self-talk.      Chung Martinez MA, LMFT                                                         ______________________________________________________________________                                                 Treatment Plan    Client's Name: Laya Zuleta  YOB: 1970    Date: January 24, 2020     DSM-V Diagnoses: 309.24 - Adjustment Disorder with Anxiety  ; V71.09 - No Diagnosis  Psychosocial / Contextual Factors: daughter with bone disease  WHODAS: 13    Referral / Collaboration:  Referral to another professional/service is not indicated at this time..    Anticipated number of session or this episode of care: ongoing    Measurable Treatment Goal(s) related to  diagnosis / functional impairment(s)   I will know I've met my goal when I learn tools to cope with and lower my anxiety.     Goal 1: Client will experience a significant reduction in ZAIRA-7 scores.     Objective #A   Client will learn and integrate DBT/CBT strategies to more effectively manage emotions and relationships.   Status: New - Date: January 24, 2020    Intervention(s)   Therapist will teach emotional regulation, distress tolerance, interpersonal effectiveness, and mindfulness skills. Meditation resources will be offered. Therapist will teach TIPP skills: Temperature, Intense exercise, Paced breathing, and Paired Muscle Relaxation.    Objective #B   Client will learn to identify negative thoughts that are present and use cbt strategies to diffuse anxiety.  Status: New - Date(s): January 24, 2020    Intervention(s)   Therapist will teach cognitive distortion identification and coping strategies.    Objective #C   Client will engage in positive self-care.  Status: New - Date: January 24, 2020    Intervention(s)   Therapist will teach self-care goals:  Maintain balance in schedule (time for self/others, relaxation/activities, leisure/tasks, home/out of the house), assert needs and set limits with others, challenge negative thoughts/use affirming and encouraging self-talk, engage with support people on regular basis, practice behavior activation behaviors (sleep hygiene, balanced eating, physical activity, medical needs, personal hygiene), acknowledge and accept your feelings.      Patient has reviewed and agreed to the above plan.    Rich Martinez MA, LMFT  January 24, 2020

## 2020-03-27 ENCOUNTER — VIRTUAL VISIT (OUTPATIENT)
Dept: PSYCHOLOGY | Facility: CLINIC | Age: 50
End: 2020-03-27
Payer: COMMERCIAL

## 2020-03-27 DIAGNOSIS — F43.22 ADJUSTMENT DISORDER WITH ANXIETY: Primary | ICD-10-CM

## 2020-03-27 PROCEDURE — 90832 PSYTX W PT 30 MINUTES: CPT | Mod: TEL | Performed by: MARRIAGE & FAMILY THERAPIST

## 2020-03-27 NOTE — PROGRESS NOTES
"                                           Progress Note    Patient Name: Laya Zuleta  Date: March 27, 2020         Service Type: Phone Visit  Video Visit: No     The patient has been notified of the following:    \"We have found that certain health care needs can be provided without the need for a face to face visit.  This service lets us provide the care you need with a phone conversation.    I will have full access to your [facility] medical record during this entire phone call.   I will be taking notes for your medical record.   Since this is like an office visit, we will bill your insurance company for this service.    There are potential benefits and risks of telephone visits (e.g. limits to patient confidentiality) that differ from in-person visits.?  Confidentiality still applies for telephone services, and nobody will record the visit.  It is important to be in a quiet, private space that is free of distractions (including cell phone or other devices) during the visit.??   If during the course of the call I believe a telephone visit is not appropriate, you will not be charged for this service\"  Consent has been obtained for this service by care team member: Yes      Session Start Time: 9:30  Session End Time: 10:00     Session Length: 30 min    Session #: 6    Attendees: Client attended alone     Treatment Plan Last Reviewed: January 24, 2020   PHQ-9 / ZAIRA-7: assessed regularly see flow sheets    DATA  Interactive Complexity: No  Crisis: No       Progress Since Last Session (Related to Symptoms / Goals / Homework):   Symptoms: No change .    Homework: Achieved / completed to satisfaction with strong self-care performance.      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     Intervention:   .. Checked for safety. Provided COVID-19 education and planning. Client reports anxiety about the pandemic. Cognitive distortion identification and coping strategies taught. \"PLEASE\" and \"ABC\" DBT skills taught. Processed emotions in session, validated, supportive counseling.     ASSESSMENT: Current Emotional / Mental Status (status of significant symptoms):   Risk status (Self / Other harm or suicidal ideation)   Patient denies current fears or concerns for personal safety.   Patient denies current or recent suicidal ideation or behaviors.   Patientdenies current or recent homicidal ideation or behaviors.   Patient denies current or recent self injurious behavior or ideation.   Patient denies other safety concerns.   Patient reports there has been no change in risk factors since their last session.     Patientreports there has been no change in protective factors since their last session.     Recommended that patient call 911 or go to the local ED should there be a change in any of these risk factors.     Appearance:   N/a: phone visit    Eye Contact:   N/a: phone visit    Psychomotor Behavior: N/a: phone visit    Attitude:   Cooperative    Orientation:   All   Speech    Rate / Production: Normal     Volume:  Normal    Mood:    Anxious    Affect:    Appropriate    Thought Content:  Clear    Thought Form:  Coherent  Logical    Insight:    Good      Medication Review:   No changes to current psychiatric medication(s)     Medication Compliance:   Yes     Changes in Health Issues:   None reported     Chemical Use Review:   Substance Use: Chemical use reviewed, no active concerns identified      Tobacco Use: No current tobacco use.      Diagnosis:  Adjustment disorder with anxiety    Collateral Reports Completed:   Not " "Applicable    PLAN: (Patient Tasks / Therapist Tasks / Other)  Client will stay educated about COVID-19 conditions and precautions. Client will reach out to at least one person per day for support and practice \"PLEASE\" and \"ABC\" DBT skills.      Chung Martinez MA, LMFT                                                         ______________________________________________________________________                                                 Treatment Plan    Client's Name: Laya Zuleta  YOB: 1970    Date: January 24, 2020     DSM-V Diagnoses: 309.24 - Adjustment Disorder with Anxiety  ; V71.09 - No Diagnosis  Psychosocial / Contextual Factors: daughter with bone disease  WHODAS: 13    Referral / Collaboration:  Referral to another professional/service is not indicated at this time..    Anticipated number of session or this episode of care: ongoing    Measurable Treatment Goal(s) related to diagnosis / functional impairment(s)   I will know I've met my goal when I learn tools to cope with and lower my anxiety.     Goal 1: Client will experience a significant reduction in ZAIRA-7 scores.     Objective #A   Client will learn and integrate DBT/CBT strategies to more effectively manage emotions and relationships.   Status: New - Date: January 24, 2020    Intervention(s)   Therapist will teach emotional regulation, distress tolerance, interpersonal effectiveness, and mindfulness skills. Meditation resources will be offered. Therapist will teach TIPP skills: Temperature, Intense exercise, Paced breathing, and Paired Muscle Relaxation.    Objective #B   Client will learn to identify negative thoughts that are present and use cbt strategies to diffuse anxiety.  Status: New - Date(s): January 24, 2020    Intervention(s)   Therapist will teach cognitive distortion identification and coping strategies.    Objective #C   Client will engage in positive self-care.  Status: New - Date: January 24, " 2020    Intervention(s)   Therapist will teach self-care goals:  Maintain balance in schedule (time for self/others, relaxation/activities, leisure/tasks, home/out of the house), assert needs and set limits with others, challenge negative thoughts/use affirming and encouraging self-talk, engage with support people on regular basis, practice behavior activation behaviors (sleep hygiene, balanced eating, physical activity, medical needs, personal hygiene), acknowledge and accept your feelings.      Patient has reviewed and agreed to the above plan.    Rich Martinez MA, LMFT  January 24, 2020

## 2020-05-08 ENCOUNTER — VIRTUAL VISIT (OUTPATIENT)
Dept: PSYCHOLOGY | Facility: CLINIC | Age: 50
End: 2020-05-08
Payer: COMMERCIAL

## 2020-05-08 DIAGNOSIS — F43.22 ADJUSTMENT DISORDER WITH ANXIETY: Primary | ICD-10-CM

## 2020-05-08 PROCEDURE — 90834 PSYTX W PT 45 MINUTES: CPT | Mod: GT | Performed by: MARRIAGE & FAMILY THERAPIST

## 2020-05-08 NOTE — PROGRESS NOTES
Progress Note    Patient Name: Laya Zuleta  Date: May 8, 2020          Service Type: Individual  Video Visit: Yes     Telemedicine Visit: The patient's condition can be safely assessed and treated via synchronous audio and visual telemedicine encounter.    Reason for Telemedicine Visit: Services only offered telehealth and due to COVID-19 restrictions  Originating Site (Patient Location): Patient's home  Distant Site (Provider Location): Provider Remote Setting  Consent:  The patient/guardian has verbally consented to: the potential risks and benefits of telemedicine (video visit) versus in person care; bill my insurance or make self-payment for services provided; and responsibility for payment of non-covered services.   Mode of Communication:  Video Conference via D[employer]  As the provider I attest to compliance with applicable laws and regulations related to telemedicine.      Session Start Time: 9:30  Session End Time: 10:00     Session Length: 30 min    Session #: 7    Attendees: Client attended alone     Treatment Plan Last Reviewed: May 8, 2020    PHQ-9 / ZAIRA-7: assessed regularly see flow sheets    DATA  Interactive Complexity: No  Crisis: No       Progress Since Last Session (Related to Symptoms / Goals / Homework):   Symptoms: No change .    Homework: Achieved / completed to satisfaction with strong self-care performance.      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     Intervention:   .. Checked for safety. Provided COVID-19 education and planning.  Client reports anxiety about the pandemic. Normalized and recommended strategies for typical  Sheltering in Place  symptoms such as weight gain, anxiety, trouble with sleep, strange dreams and cognitive fogginess. Processed emotions in session, validated, supportive counseling.     ASSESSMENT: Current Emotional / Mental Status (status of significant symptoms):   Risk status (Self / Other harm or suicidal ideation)   Patient denies current fears or concerns for personal safety.   Patient denies current or recent suicidal ideation or behaviors.   Patientdenies current or recent homicidal ideation or behaviors.   Patient denies current or recent self injurious behavior or ideation.   Patient denies other safety concerns.   Patient reports there has been no change in risk factors since their last session.     Patientreports there has been no change in protective factors since their last session.     Recommended that patient call 911 or go to the local ED should there be a change in any of these risk factors.     Appearance:   Appropriate    Eye Contact:   Good    Psychomotor Behavior: Normal    Attitude:   Cooperative    Orientation:   All   Speech    Rate / Production: Normal     Volume:  Normal    Mood:    Anxious    Affect:    Appropriate    Thought Content:  Clear    Thought Form:  Coherent  Logical    Insight:    Good      Medication Review:   No changes to current psychiatric medication(s)     Medication Compliance:   Yes     Changes in Health Issues:   None reported     Chemical Use Review:   Substance Use: Chemical use reviewed, no active concerns identified      Tobacco Use: No current tobacco use.      Diagnosis:  Adjustment disorder with anxiety    Collateral Reports Completed:   Not Applicable    PLAN: (Patient Tasks / Therapist Tasks / Other)  Client will stay educated about COVID-19 conditions and precautions. Client will reach out to at least one person per day for support and use self-care strategies to  mitigate the behavioral results of the pandemic.      Chung Martinez MA, LMFT                                                         ______________________________________________________________________                                                 Treatment Plan    Client's Name: Laya Zuleta  YOB: 1970    Date: May 8, 2020     DSM-V Diagnoses: 309.24 - Adjustment Disorder with Anxiety  ; V71.09 - No Diagnosis  Psychosocial / Contextual Factors: daughter with bone disease  WHODAS: 13    Referral / Collaboration:  Referral to another professional/service is not indicated at this time..    Anticipated number of session or this episode of care: ongoing    Measurable Treatment Goal(s) related to diagnosis / functional impairment(s)   I will know I've met my goal when I learn tools to cope with and lower my anxiety.     Goal 1: Client will experience a significant reduction in ZAIRA-7 scores.     Objective #A   Client will learn and integrate DBT/CBT strategies to more effectively manage emotions and relationships.   Status: Continued: May 8, 2020     Intervention(s)   Therapist will teach emotional regulation, distress tolerance, interpersonal effectiveness, and mindfulness skills. Meditation resources will be offered. Therapist will teach TIPP skills: Temperature, Intense exercise, Paced breathing, and Paired Muscle Relaxation.    Objective #B   Client will learn to identify negative thoughts that are present and use cbt strategies to diffuse anxiety.  Status: Continued: May 8, 2020     Intervention(s)   Therapist will teach cognitive distortion identification and coping strategies.    Objective #C   Client will engage in positive self-care.  Status: Continued: May 8, 2020     Intervention(s)   Therapist will teach self-care goals:  Maintain balance in schedule (time for self/others, relaxation/activities, leisure/tasks, home/out of the house), assert needs and set limits with others,  challenge negative thoughts/use affirming and encouraging self-talk, engage with support people on regular basis, practice behavior activation behaviors (sleep hygiene, balanced eating, physical activity, medical needs, personal hygiene), acknowledge and accept your feelings.      Patient has reviewed and agreed to the above plan.    Rich Martinez MA, LMFT  May 8, 2020

## 2020-05-22 ENCOUNTER — VIRTUAL VISIT (OUTPATIENT)
Dept: PSYCHOLOGY | Facility: CLINIC | Age: 50
End: 2020-05-22
Payer: COMMERCIAL

## 2020-05-22 DIAGNOSIS — F43.22 ADJUSTMENT DISORDER WITH ANXIETY: Primary | ICD-10-CM

## 2020-05-22 PROCEDURE — 90834 PSYTX W PT 45 MINUTES: CPT | Mod: GT | Performed by: MARRIAGE & FAMILY THERAPIST

## 2020-05-22 NOTE — PROGRESS NOTES
Progress Note    Patient Name: Laya Zuleta  Date: May 22, 2020          Service Type: Individual  Video Visit: Yes     Telemedicine Visit: The patient's condition can be safely assessed and treated via synchronous audio and visual telemedicine encounter.    Reason for Telemedicine Visit: Services only offered telehealth and due to COVID-19 restrictions  Originating Site (Patient Location): Patient's home  Distant Site (Provider Location): Provider Remote Setting  Consent:  The patient/guardian has verbally consented to: the potential risks and benefits of telemedicine (video visit) versus in person care; bill my insurance or make self-payment for services provided; and responsibility for payment of non-covered services.   Mode of Communication:  Video Conference via Doxy  As the provider I attest to compliance with applicable laws and regulations related to telemedicine.      Session Start Time: 9:30  Session End Time: 10:00     Session Length: 30 min    Session #: 8    Attendees: Client attended alone     Treatment Plan Last Reviewed: May 8, 2020    PHQ-9 / ZAIRA-7: assessed regularly see flow sheets    DATA  Interactive Complexity: No  Crisis: No       Progress Since Last Session (Related to Symptoms / Goals / Homework):   Symptoms: No change .    Homework: Achieved / completed to satisfaction with strong self-care performance.      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     Intervention:   .. Checked for safety. Provided COVID-19 education and  planning. Client reports anxiety about the pandemic. Client reports she is grieving a friendship with a neighbor who suddenly stopped the friendship and would not say why. Grief process and coping skills taught. Normalized and recommended strategies for typical  Sheltering in Place  symptoms such as weight gain, anxiety, trouble with sleep, strange dreams and cognitive fogginess. Processed emotions in session, validated, supportive counseling.     ASSESSMENT: Current Emotional / Mental Status (status of significant symptoms):   Risk status (Self / Other harm or suicidal ideation)   Patient denies current fears or concerns for personal safety.   Patient denies current or recent suicidal ideation or behaviors.   Patientdenies current or recent homicidal ideation or behaviors.   Patient denies current or recent self injurious behavior or ideation.   Patient denies other safety concerns.   Patient reports there has been no change in risk factors since their last session.     Patientreports there has been no change in protective factors since their last session.     Recommended that patient call 911 or go to the local ED should there be a change in any of these risk factors.     Appearance:   Appropriate    Eye Contact:   Good    Psychomotor Behavior: Normal    Attitude:   Cooperative    Orientation:   All   Speech    Rate / Production: Normal     Volume:  Normal    Mood:    Anxious  Grieving   Affect:    Appropriate    Thought Content:  Clear    Thought Form:  Coherent  Logical    Insight:    Good      Medication Review:   No changes to current psychiatric medication(s)     Medication Compliance:   Yes     Changes in Health Issues:   None reported     Chemical Use Review:   Substance Use: Chemical use reviewed, no active concerns identified      Tobacco Use: No current tobacco use.      Diagnosis:  Adjustment disorder with anxiety    Collateral Reports Completed:   Not Applicable    PLAN: (Patient Tasks / Therapist  Tasks / Other)  Client will stay educated about COVID-19 conditions and precautions. Client will reach out to at least one person per day for support and use self-care strategies to mitigate the behavioral results of the pandemic. Will use grief coping skills to deal with loss of neighbor's friendship.      Chung Martinez MA, LMFT                                                         ______________________________________________________________________                                                 Treatment Plan    Client's Name: Laya Zuleta  YOB: 1970    Date: May 8, 2020     DSM-V Diagnoses: 309.24 - Adjustment Disorder with Anxiety  ; V71.09 - No Diagnosis  Psychosocial / Contextual Factors: daughter with bone disease  WHODAS: 13    Referral / Collaboration:  Referral to another professional/service is not indicated at this time..    Anticipated number of session or this episode of care: ongoing    Measurable Treatment Goal(s) related to diagnosis / functional impairment(s)   I will know I've met my goal when I learn tools to cope with and lower my anxiety.     Goal 1: Client will experience a significant reduction in ZAIRA-7 scores.     Objective #A   Client will learn and integrate DBT/CBT strategies to more effectively manage emotions and relationships.   Status: Continued: May 8, 2020     Intervention(s)   Therapist will teach emotional regulation, distress tolerance, interpersonal effectiveness, and mindfulness skills. Meditation resources will be offered. Therapist will teach TIPP skills: Temperature, Intense exercise, Paced breathing, and Paired Muscle Relaxation.    Objective #B   Client will learn to identify negative thoughts that are present and use cbt strategies to diffuse anxiety.  Status: Continued: May 8, 2020     Intervention(s)   Therapist will teach cognitive distortion identification and coping strategies.    Objective #C   Client will engage in positive  self-care.  Status: Continued: May 8, 2020     Intervention(s)   Therapist will teach self-care goals:  Maintain balance in schedule (time for self/others, relaxation/activities, leisure/tasks, home/out of the house), assert needs and set limits with others, challenge negative thoughts/use affirming and encouraging self-talk, engage with support people on regular basis, practice behavior activation behaviors (sleep hygiene, balanced eating, physical activity, medical needs, personal hygiene), acknowledge and accept your feelings.      Patient has reviewed and agreed to the above plan.    Rich Martinez MA, LMFT  May 8, 2020

## 2020-06-19 ENCOUNTER — VIRTUAL VISIT (OUTPATIENT)
Dept: PSYCHOLOGY | Facility: CLINIC | Age: 50
End: 2020-06-19
Payer: COMMERCIAL

## 2020-06-19 DIAGNOSIS — F43.22 ADJUSTMENT DISORDER WITH ANXIETY: Primary | ICD-10-CM

## 2020-06-19 PROCEDURE — 90834 PSYTX W PT 45 MINUTES: CPT | Mod: GT | Performed by: MARRIAGE & FAMILY THERAPIST

## 2020-06-19 NOTE — PROGRESS NOTES
Progress Note    Patient Name: Laya Zuleta  Date: June 19, 2020        Service Type: Individual  Video Visit: Yes     Telemedicine Visit: The patient's condition can be safely assessed and treated via synchronous audio and visual telemedicine encounter.    Reason for Telemedicine Visit: Services only offered telehealth and due to COVID-19 restrictions  Originating Site (Patient Location): Patient's home  Distant Site (Provider Location): Provider Remote Setting  Consent:  The patient/guardian has verbally consented to: the potential risks and benefits of telemedicine (video visit) versus in person care; bill my insurance or make self-payment for services provided; and responsibility for payment of non-covered services.   Mode of Communication:  Video Conference via Doxy  As the provider I attest to compliance with applicable laws and regulations related to telemedicine.      Session Start Time: 9:30  Session End Time: 10:15     Session Length: 45 min    Session #: 10    Attendees: Client attended alone     Treatment Plan Last Reviewed: May 8, 2020    PHQ-9 / ZAIRA-7: assessed regularly see flow sheets    DATA  Interactive Complexity: No  Crisis: No       Progress Since Last Session (Related to Symptoms / Goals / Homework):   Symptoms: No change .    Homework: Achieved / completed to satisfaction with strong self-care performance.      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     Intervention:   .. Checked for safety. Reviewed COVID-19 safety precautions and  performance. Client reports anxiety about the pandemic for herself and family. Client reports she is having difficulty coping with friends and acquaintances who have drastically divergent political views in this time of unrest. Processed emotions in session, validated, supportive counseling.     ASSESSMENT: Current Emotional / Mental Status (status of significant symptoms):   Risk status (Self / Other harm or suicidal ideation)   Patient denies current fears or concerns for personal safety.   Patient denies current or recent suicidal ideation or behaviors.   Patientdenies current or recent homicidal ideation or behaviors.   Patient denies current or recent self injurious behavior or ideation.   Patient denies other safety concerns.   Patient reports there has been no change in risk factors since their last session.     Patientreports there has been no change in protective factors since their last session.     Recommended that patient call 911 or go to the local ED should there be a change in any of these risk factors.     Appearance:   Appropriate    Eye Contact:   Good    Psychomotor Behavior: Normal    Attitude:   Cooperative    Orientation:   All   Speech    Rate / Production: Normal     Volume:  Normal    Mood:    Anxious    Affect:    Appropriate    Thought Content:  Clear    Thought Form:  Coherent  Logical    Insight:    Good      Medication Review:   No changes to current psychiatric medication(s)     Medication Compliance:   Yes     Changes in Health Issues:   None reported     Chemical Use Review:   Substance Use: Chemical use reviewed, no active concerns identified      Tobacco Use: No current tobacco use.      Diagnosis:  Adjustment disorder with anxiety    Collateral Reports Completed:   Not Applicable    PLAN: (Patient Tasks / Therapist Tasks / Other)  Client will stay educated about COVID-19 conditions and precautions. Client will reach out to at least one person per day for support and use self-care  strategies to mitigate the behavioral results of the pandemic.     Chung Martinez MA, LMFT                                                         ______________________________________________________________________                                                 Treatment Plan    Client's Name: Laya Zuleta  YOB: 1970    Date: May 8, 2020     DSM-V Diagnoses: 309.24 - Adjustment Disorder with Anxiety  ; V71.09 - No Diagnosis  Psychosocial / Contextual Factors: daughter with bone disease  WHODAS: 13    Referral / Collaboration:  Referral to another professional/service is not indicated at this time..    Anticipated number of session or this episode of care: ongoing    Measurable Treatment Goal(s) related to diagnosis / functional impairment(s)   I will know I've met my goal when I learn tools to cope with and lower my anxiety.     Goal 1: Client will experience a significant reduction in ZAIRA-7 scores.     Objective #A   Client will learn and integrate DBT/CBT strategies to more effectively manage emotions and relationships.   Status: Continued: May 8, 2020     Intervention(s)   Therapist will teach emotional regulation, distress tolerance, interpersonal effectiveness, and mindfulness skills. Meditation resources will be offered. Therapist will teach TIPP skills: Temperature, Intense exercise, Paced breathing, and Paired Muscle Relaxation.    Objective #B   Client will learn to identify negative thoughts that are present and use cbt strategies to diffuse anxiety.  Status: Continued: May 8, 2020     Intervention(s)   Therapist will teach cognitive distortion identification and coping strategies.    Objective #C   Client will engage in positive self-care.  Status: Continued: May 8, 2020     Intervention(s)   Therapist will teach self-care goals:  Maintain balance in schedule (time for self/others, relaxation/activities, leisure/tasks, home/out of the house), assert needs and set limits with  others, challenge negative thoughts/use affirming and encouraging self-talk, engage with support people on regular basis, practice behavior activation behaviors (sleep hygiene, balanced eating, physical activity, medical needs, personal hygiene), acknowledge and accept your feelings.      Patient has reviewed and agreed to the above plan.    Rich Martinez MA, LMFT  May 8, 2020

## 2020-07-07 ENCOUNTER — TELEPHONE (OUTPATIENT)
Dept: PEDIATRICS | Facility: CLINIC | Age: 50
End: 2020-07-07

## 2020-07-07 ENCOUNTER — E-VISIT (OUTPATIENT)
Dept: PEDIATRICS | Facility: CLINIC | Age: 50
End: 2020-07-07
Payer: COMMERCIAL

## 2020-07-07 DIAGNOSIS — S00.269A INSECT BITE OF EYELID, UNSPECIFIED LATERALITY, INITIAL ENCOUNTER: Primary | ICD-10-CM
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
## 2020-07-07 PROCEDURE — 99421 OL DIG E/M SVC 5-10 MIN: CPT | Performed by: NURSE PRACTITIONER

## 2020-07-07 RX ORDER — PREDNISONE 20 MG/1
30 TABLET ORAL DAILY
Qty: 8 TABLET | Refills: 0 | Status: SHIPPED | OUTPATIENT
Start: 2020-07-07 | End: 2020-07-12

## 2020-07-07 NOTE — TELEPHONE ENCOUNTER
The Pt called in with concerns about an insect bite she has on her eyelid. She was bitten a day ago. She is   She did submit an E-visit for this about 20 minutes ago.     The Pt is concerned about the severity of the allergic reaction.   No trouble breathing, swallowing, no tongue swelling.   Her eyelid has some mild/moderate swelling.   It is slightly red. The swelling is not spreading. The Pt thinks it may have gotten a little worse since last night.   She did take a dose of Benadryl last night, nothing since then.     Advised her to take another dose of Benadryl today and await for reply from provider on E-visit. Advised against topical Benadryl due to close proximity to her eye.     Reviewed pictures. Swelling is mild/moderate. Because no signs or symptoms of anaphylaxis, and no spreading of swelling, advised the Pt to wait for response to Evisit.     Wilda Alvares, RN   [facility] -- Triage Nurse

## 2020-07-31 ENCOUNTER — VIRTUAL VISIT (OUTPATIENT)
Dept: PSYCHOLOGY | Facility: CLINIC | Age: 50
End: 2020-07-31
Payer: COMMERCIAL

## 2020-07-31 DIAGNOSIS — F43.22 ADJUSTMENT DISORDER WITH ANXIETY: Primary | ICD-10-CM

## 2020-07-31 PROCEDURE — 90834 PSYTX W PT 45 MINUTES: CPT | Mod: GT | Performed by: MARRIAGE & FAMILY THERAPIST

## 2020-07-31 NOTE — PROGRESS NOTES
Progress Note    Patient Name: Laya Zuleta  Date: July 31, 2020         Service Type: Individual  Video Visit: Yes     Telemedicine Visit: The patient's condition can be safely assessed and treated via synchronous audio and visual telemedicine encounter.    Reason for Telemedicine Visit: Services only offered telehealth and due to COVID-19 restrictions  Originating Site (Patient Location): Patient's home  Distant Site (Provider Location): Provider Remote Setting  Consent:  The patient/guardian has verbally consented to: the potential risks and benefits of telemedicine (video visit) versus in person care; bill my insurance or make self-payment for services provided; and responsibility for payment of non-covered services.   Mode of Communication:  Video Conference via Doxy  As the provider I attest to compliance with applicable laws and regulations related to telemedicine.      Session Start Time: 9:30  Session End Time: 10:15     Session Length: 45 min    Session #: 11    Attendees: Client attended alone     Treatment Plan Last Reviewed: May 8, 2020    PHQ-9 / ZAIRA-7: assessed regularly see flow sheets    DATA  Interactive Complexity: No  Crisis: No       Progress Since Last Session (Related to Symptoms / Goals / Homework):   Symptoms: No change .    Homework: Achieved / completed to satisfaction with strong self-care performance.      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     Intervention:   Taught/reviewed Interpersonal effectiveness and assertive communication skills. Client reports she is concerned about her daughter's social anxiety during the pandemic, when it is easier to isolate. She says her daughter has started a therapy group and individual therapy so she is hopeful. Client reports difficulty coping with a family friend with whom they spend time who continually brags about herself and her children. Role-played various scenarios. Processed emotions in session, validated, supportive counseling.     ASSESSMENT: Current Emotional / Mental Status (status of significant symptoms):   Risk status (Self / Other harm or suicidal ideation)   Patient denies current fears or concerns for personal safety.   Patient denies current or recent suicidal ideation or behaviors.   Patientdenies current or recent homicidal ideation or behaviors.   Patient denies current or recent self injurious behavior or ideation.   Patient denies other safety concerns.   Patient reports there has been no change in risk factors since their last session.     Patientreports there has been no change in protective factors since their last session.     Recommended that patient call 911 or go to the local ED should there be a change in any of these risk factors.     Appearance:   Appropriate    Eye Contact:   Good    Psychomotor Behavior: Normal    Attitude:   Cooperative    Orientation:   All   Speech    Rate / Production: Normal     Volume:  Normal    Mood:    Anxious    Affect:    Appropriate    Thought Content:  Clear    Thought Form:  Coherent  Logical    Insight:    Good      Medication Review:   No changes to current psychiatric medication(s)     Medication Compliance:   Yes     Changes in Health Issues:   None reported     Chemical Use Review:   Substance Use: Chemical use reviewed, no active concerns identified      Tobacco Use: No current tobacco use.      Diagnosis:  Adjustment disorder with  anxiety    Collateral Reports Completed:   Not Applicable    PLAN: (Patient Tasks / Therapist Tasks / Other)  Client will employ Interpersonal effectiveness and assertive communication skills with friends daily.      Chung Martinez MA, LMFT                                                         ______________________________________________________________________                                                 Treatment Plan    Client's Name: Laya Zuleta  YOB: 1970    Date: May 8, 2020     DSM-V Diagnoses: 309.24 - Adjustment Disorder with Anxiety  ; V71.09 - No Diagnosis  Psychosocial / Contextual Factors: daughter with bone disease  WHODAS: 13    Referral / Collaboration:  Referral to another professional/service is not indicated at this time..    Anticipated number of session or this episode of care: ongoing    Measurable Treatment Goal(s) related to diagnosis / functional impairment(s)   I will know I've met my goal when I learn tools to cope with and lower my anxiety.     Goal 1: Client will experience a significant reduction in ZAIRA-7 scores.     Objective #A   Client will learn and integrate DBT/CBT strategies to more effectively manage emotions and relationships.   Status: Continued: May 8, 2020     Intervention(s)   Therapist will teach emotional regulation, distress tolerance, interpersonal effectiveness, and mindfulness skills. Meditation resources will be offered. Therapist will teach TIPP skills: Temperature, Intense exercise, Paced breathing, and Paired Muscle Relaxation.    Objective #B   Client will learn to identify negative thoughts that are present and use cbt strategies to diffuse anxiety.  Status: Continued: May 8, 2020     Intervention(s)   Therapist will teach cognitive distortion identification and coping strategies.    Objective #C   Client will engage in positive self-care.  Status: Continued: May 8, 2020     Intervention(s)   Therapist will teach self-care  goals:  Maintain balance in schedule (time for self/others, relaxation/activities, leisure/tasks, home/out of the house), assert needs and set limits with others, challenge negative thoughts/use affirming and encouraging self-talk, engage with support people on regular basis, practice behavior activation behaviors (sleep hygiene, balanced eating, physical activity, medical needs, personal hygiene), acknowledge and accept your feelings.      Patient has reviewed and agreed to the above plan.    Rich Martinez MA, LMFT  May 8, 2020

## 2020-09-11 ENCOUNTER — VIRTUAL VISIT (OUTPATIENT)
Dept: PSYCHOLOGY | Facility: CLINIC | Age: 50
End: 2020-09-11
Payer: COMMERCIAL

## 2020-09-11 DIAGNOSIS — F43.22 ADJUSTMENT DISORDER WITH ANXIETY: Primary | ICD-10-CM

## 2020-09-11 PROCEDURE — 90834 PSYTX W PT 45 MINUTES: CPT | Mod: GT | Performed by: MARRIAGE & FAMILY THERAPIST

## 2020-09-11 NOTE — PROGRESS NOTES
"Laya Zuleta is a 50 year old female who is being evaluated via a billable video visit.      The patient has been notified of following:     \"This video visit will be conducted via a call between you and your physician/provider. We have found that certain health care needs can be provided without the need for an in-person physical exam.  This service lets us provide the care you need with a video conversation.  If a prescription is necessary we can send it directly to your pharmacy.  If lab work is needed we can place an order for that and you can then stop by our lab to have the test done at a later time.    Video visits are billed at different rates depending on your insurance coverage.  Please reach out to your insurance provider with any questions.    If during the course of the call the physician/provider feels a video visit is not appropriate, you will not be charged for this service.\"    Patient has given verbal consent for Video visit? Yes  How would you like to obtain your AVS? MyChart  If you are dropped from the video visit, the video invite should be resent to: Send to e-mail at: [email or web address]  Will anyone else be joining your video visit? No    Subjective     Laya Zuleta is a 50 year old female who presents today via video visit for the following health issues:    History of Present Illness        Mental Health Follow-up:  Patient presents to follow-up on Anxiety.    Patient's anxiety since last visit has been:  Good  The patient is not having other symptoms associated with anxiety.  Any significant life events: No  Patient is not feeling anxious or having panic attacks.  Patient has no concerns about alcohol or drug use.     Social History  Tobacco Use    Smoking status: Never Smoker    Smokeless tobacco: Never Used  Alcohol use: Yes    Comment: 2 times per week, 2 per time  Drug use: No      Today's PHQ-9         PHQ-9 Total Score:     (P) 0   PHQ-9 Q9 Thoughts of better off " dead/self-harm past 2 weeks :   (P) Not at all   Thoughts of suicide or self harm:      Self-harm Plan:        Self-harm Action:          Safety concerns for self or others:            Declined scheduling physical this year. Pended medication refill. Pt has visits scheduled for immunizations and mammogram next week.    Video Start Time: 1:55    Review of Systems   Constitutional, HEENT, cardiovascular, pulmonary, gi and gu systems are negative, except as otherwise noted.      Objective         Vitals:  No vitals were obtained today due to virtual visit.    Physical Exam     GENERAL: Healthy, alert and no distress  EYES: Eyes grossly normal to inspection.  No discharge or erythema, or obvious scleral/conjunctival abnormalities.  RESP: No audible wheeze, cough, or visible cyanosis.  No visible retractions or increased work of breathing.    SKIN: Visible skin clear. No significant rash, abnormal pigmentation or lesions.  NEURO: Cranial nerves grossly intact.  Mentation and speech appropriate for age.  PSYCH: Mentation appears normal, affect normal/bright, judgement and insight intact, normal speech and appearance well-groomed.      No results found for any visits on 09/18/20.        Assessment & Plan     PMDD (premenstrual dysphoric disorder)  Tried to come off the Celexa but developed anxiety. May have been situational  - citalopram (CELEXA) 10 MG tablet; Take 1 tablet (10 mg) by mouth daily  - buPROPion (WELLBUTRIN XL) 150 MG 24 hr tablet; Take 1 tablet (150 mg) by mouth every morning  -Avoid caffeine, increase exercise, focus on sleep  -If feeling good for 6-12 months, could consider another weaning trial. I would recommend 1/2 tab for 3 months then stop, as she didn't tolerate a quicker wean.     Weight gain  Continues to gain. No improvement with topamax. Suffers from snacking. 45 minute walk 3x per week.   - COMPREHENSIVE WEIGHT MANAGEMENT  - Lipid panel reflex to direct LDL Fasting; Future  - Glucose; Future  -  "Cortisol; Future  - **TSH with free T4 reflex FUTURE 1yr; Future  -Labs  -Increase walking frequency, even if only 10 minutes on the days she isn't doing a long walk  -Reduce carbs.  -See weight clinic    Healthcare maintenance  - Lipid panel reflex to direct LDL Fasting; Future  - Glucose; Future     BMI:   Estimated body mass index is 29.18 kg/m  as calculated from the following:    Height as of 1/23/20: 1.715 m (5' 7.5\").    Weight as of 1/23/20: 85.8 kg (189 lb 1.6 oz).   Weight management plan: Patient referred to endocrine and/or weight management specialty        See Patient Instructions    No follow-ups on file.    FLORENCE Zhu CNP  [facility]      Video-Visit Details    Type of service:  Video Visit    Video End Time:2:07 PM    Originating Location (pt. Location): Home    [facility]     Platform used for Video Visit: Health-Connected    Answers for HPI/ROS submitted by the patient on 9/18/2020   Chronic problems general questions HPI Form  PHQ9 TOTAL SCORE: 0  ZAIRA 7 TOTAL SCORE: 0    "

## 2020-09-11 NOTE — PROGRESS NOTES
"Pre-Visit Planning     Future Appointments   Date Time Provider Department [place]   9/11/2020  9:30 AM Chung Martinez CCEA CCEA   9/18/2020  1:50 PM Suzanna Smith APRN CNP EAFP EA   9/22/2020  3:00 PM EAMA1 EAMAMM EA   9/24/2020 10:30 AM EA NURSE AB [facility]    9/25/2020  9:30 AM Chung Martinez CCLULU CCEA   10/9/2020  9:30 AM Chung Martinez CCLULU CCEA   10/23/2020  9:30 AM Chung Martinez CC CCEA     Arrival Time for this Appointment:  1:35 PM   Appointment Notes for this encounter:   f/u meds     Questionnaires Reviewed/Assigned  No additional questionnaires are needed  {SCRIPTING IF PT ANSWERS \"Hi, my name is [your name] and I am calling on behalf of your provider's office at [facility].  I am calling to confirm and prep your upcoming appointment on [see above for date/time/place/provider].  I see that you are coming in for [see above for appt notes]. Are there any additional questions or concerns you'd like to review with your provider during your visit?\" (Optional):[id number]}  {SCRIPTING FOR VOICEMAIL \"Hello, this is [your name] and I am calling to get you prepped for an upcoming visit, which will help speed up your actual visit. Please call us back at [phone]\" (Optional) :[id number]}  Patient preferred phone number: [phone]    {[facility] Guide:[id number]}    "

## 2020-09-12 ASSESSMENT — ANXIETY QUESTIONNAIRES
2. NOT BEING ABLE TO STOP OR CONTROL WORRYING: NOT AT ALL
GAD7 TOTAL SCORE: 2
3. WORRYING TOO MUCH ABOUT DIFFERENT THINGS: SEVERAL DAYS
5. BEING SO RESTLESS THAT IT IS HARD TO SIT STILL: NOT AT ALL
IF YOU CHECKED OFF ANY PROBLEMS ON THIS QUESTIONNAIRE, HOW DIFFICULT HAVE THESE PROBLEMS MADE IT FOR YOU TO DO YOUR WORK, TAKE CARE OF THINGS AT HOME, OR GET ALONG WITH OTHER PEOPLE: SOMEWHAT DIFFICULT
6. BECOMING EASILY ANNOYED OR IRRITABLE: NOT AT ALL
7. FEELING AFRAID AS IF SOMETHING AWFUL MIGHT HAPPEN: NOT AT ALL
1. FEELING NERVOUS, ANXIOUS, OR ON EDGE: SEVERAL DAYS

## 2020-09-12 ASSESSMENT — PATIENT HEALTH QUESTIONNAIRE - PHQ9
SUM OF ALL RESPONSES TO PHQ QUESTIONS 1-9: 0
5. POOR APPETITE OR OVEREATING: NOT AT ALL

## 2020-09-12 NOTE — PROGRESS NOTES
Progress Note    Patient Name: Laya Zuleta  Date: September 11, 2020          Service Type: Individual  Video Visit: Yes     Telemedicine Visit: The patient's condition can be safely assessed and treated via synchronous audio and visual telemedicine encounter.    Reason for Telemedicine Visit: Services only offered telehealth and due to COVID-19 restrictions  Originating Site (Patient Location): Patient's home  Distant Site (Provider Location): Provider Remote Setting  Consent:  The patient/guardian has verbally consented to: the potential risks and benefits of telemedicine (video visit) versus in person care; bill my insurance or make self-payment for services provided; and responsibility for payment of non-covered services.   Mode of Communication:  Video Conference via Doxy  As the provider I attest to compliance with applicable laws and regulations related to telemedicine.      Session Start Time: 9:30  Session End Time: 10:15     Session Length: 45 min    Session #: 12    Attendees: Client attended alone     Treatment Plan Last Reviewed: September 11, 2020   PHQ-9 / ZAIRA-7: assessed regularly see flow sheets    DATA  Interactive Complexity: No  Crisis: No       Progress Since Last Session (Related to Symptoms / Goals / Homework):   Symptoms: No change .    Homework: Achieved / completed to satisfaction with strong self-care performance.      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     Intervention:   Taught/reviewed emotion regulation \"PLEASE\" behaviors. Client reports she has discovered a long time friend has a side to her behavior that client found distasteful when they were at a party together. Weighing Pros and Cons of various approaches to the friendship, including letting it fall away. Values clarification exercise. Processed emotions in session, validated, supportive counseling.     ASSESSMENT: Current Emotional / Mental Status (status of significant symptoms):   Risk status (Self / Other harm or suicidal ideation)   Patient denies current fears or concerns for personal safety.   Patient denies current or recent suicidal ideation or behaviors.   Patientdenies current or recent homicidal ideation or behaviors.   Patient denies current or recent self injurious behavior or ideation.   Patient denies other safety concerns.   Patient reports there has been no change in risk factors since their last session.     Patientreports there has been no change in protective factors since their last session.     Recommended that patient call 911 or go to the local ED should there be a change in any of these risk factors.     Appearance:   Appropriate    Eye Contact:   Good    Psychomotor Behavior: Normal    Attitude:   Cooperative    Orientation:   All   Speech    Rate / Production: Normal     Volume:  Normal    Mood:    Anxious    Affect:    Appropriate    Thought Content:  Clear    Thought Form:  Coherent  Logical    Insight:    Good      Medication Review:   No changes to current psychiatric medication(s)     Medication Compliance:   Yes     Changes in Health Issues:   None reported     Chemical Use Review:   Substance Use: Chemical use reviewed, no active concerns identified      Tobacco Use: No current tobacco use.      Diagnosis:  Adjustment disorder with anxiety    Collateral Reports Completed:   Not Applicable    PLAN: (Patient Tasks / Therapist Tasks / Other)  Client will " follow through with evaluating her choices with her long time friend, using values clarification data.        Chung Martinez MA, LMFT                                                         ______________________________________________________________________                                                 Treatment Plan    Client's Name: Laya Zuleta  YOB: 1970    Date: September 11, 2020     DSM-V Diagnoses: 309.24 - Adjustment Disorder with Anxiety  ; V71.09 - No Diagnosis  Psychosocial / Contextual Factors: daughter with bone disease  WHODAS: 13    Referral / Collaboration:  Referral to another professional/service is not indicated at this time..    Anticipated number of session or this episode of care: ongoing    Measurable Treatment Goal(s) related to diagnosis / functional impairment(s)   I will know I've met my goal when I learn tools to cope with and lower my anxiety.     Goal 1: Client will experience a significant reduction in ZAIRA-7 scores.     Objective #A   Client will learn and integrate DBT/CBT strategies to more effectively manage emotions and relationships.   Status: Continued: September 11, 2020     Intervention(s)   Therapist will teach emotional regulation, distress tolerance, interpersonal effectiveness, and mindfulness skills. Meditation resources will be offered. Therapist will teach TIPP skills: Temperature, Intense exercise, Paced breathing, and Paired Muscle Relaxation.    Objective #B   Client will learn to identify negative thoughts that are present and use cbt strategies to diffuse anxiety.  Status: Continued: September 11, 2020     Intervention(s)   Therapist will teach cognitive distortion identification and coping strategies.    Objective #C   Client will engage in positive self-care.  Status: Continued: September 11, 2020     Intervention(s)   Therapist will teach self-care goals:  Maintain balance in schedule (time for self/others, relaxation/activities,  leisure/tasks, home/out of the house), assert needs and set limits with others, challenge negative thoughts/use affirming and encouraging self-talk, engage with support people on regular basis, practice behavior activation behaviors (sleep hygiene, balanced eating, physical activity, medical needs, personal hygiene), acknowledge and accept your feelings.      Patient has reviewed and agreed to the above plan.    Rich Martinez MA, LMFT  September 11, 2020

## 2020-09-13 ASSESSMENT — ANXIETY QUESTIONNAIRES: GAD7 TOTAL SCORE: 2

## 2020-09-18 ENCOUNTER — VIRTUAL VISIT (OUTPATIENT)
Dept: PEDIATRICS | Facility: CLINIC | Age: 50
End: 2020-09-18
Payer: COMMERCIAL

## 2020-09-18 DIAGNOSIS — R63.5 WEIGHT GAIN: Primary | ICD-10-CM

## 2020-09-18 DIAGNOSIS — Z00.00 HEALTHCARE MAINTENANCE: ICD-10-CM

## 2020-09-18 DIAGNOSIS — F32.81 PMDD (PREMENSTRUAL DYSPHORIC DISORDER): ICD-10-CM

## 2020-09-18 PROCEDURE — 99213 OFFICE O/P EST LOW 20 MIN: CPT | Mod: GT | Performed by: NURSE PRACTITIONER

## 2020-09-18 RX ORDER — CITALOPRAM HYDROBROMIDE 10 MG/1
10 TABLET ORAL DAILY
Qty: 90 TABLET | Refills: 3 | Status: SHIPPED | OUTPATIENT
Start: 2020-09-18 | End: 2021-01-15

## 2020-09-18 RX ORDER — BUPROPION HYDROCHLORIDE 150 MG/1
150 TABLET ORAL EVERY MORNING
Qty: 180 TABLET | Refills: 3 | Status: SHIPPED | OUTPATIENT
Start: 2020-09-18 | End: 2021-09-28

## 2020-09-18 ASSESSMENT — PATIENT HEALTH QUESTIONNAIRE - PHQ9
SUM OF ALL RESPONSES TO PHQ QUESTIONS 1-9: 0
SUM OF ALL RESPONSES TO PHQ QUESTIONS 1-9: 0

## 2020-09-18 ASSESSMENT — ANXIETY QUESTIONNAIRES
1. FEELING NERVOUS, ANXIOUS, OR ON EDGE: NOT AT ALL
GAD7 TOTAL SCORE: 0
4. TROUBLE RELAXING: NOT AT ALL
GAD7 TOTAL SCORE: 0
5. BEING SO RESTLESS THAT IT IS HARD TO SIT STILL: NOT AT ALL
6. BECOMING EASILY ANNOYED OR IRRITABLE: NOT AT ALL
GAD7 TOTAL SCORE: 0
2. NOT BEING ABLE TO STOP OR CONTROL WORRYING: NOT AT ALL
3. WORRYING TOO MUCH ABOUT DIFFERENT THINGS: NOT AT ALL
7. FEELING AFRAID AS IF SOMETHING AWFUL MIGHT HAPPEN: NOT AT ALL
7. FEELING AFRAID AS IF SOMETHING AWFUL MIGHT HAPPEN: NOT AT ALL

## 2020-09-19 ASSESSMENT — PATIENT HEALTH QUESTIONNAIRE - PHQ9: SUM OF ALL RESPONSES TO PHQ QUESTIONS 1-9: 0

## 2020-09-19 ASSESSMENT — ANXIETY QUESTIONNAIRES: GAD7 TOTAL SCORE: 0

## 2020-09-29 ENCOUNTER — ALLIED HEALTH/NURSE VISIT (OUTPATIENT)
Dept: NURSING | Facility: CLINIC | Age: 50
End: 2020-09-29
Payer: COMMERCIAL

## 2020-09-29 DIAGNOSIS — Z00.00 HEALTHCARE MAINTENANCE: ICD-10-CM

## 2020-09-29 DIAGNOSIS — R63.5 WEIGHT GAIN: ICD-10-CM

## 2020-09-29 DIAGNOSIS — Z23 ENCOUNTER FOR IMMUNIZATION: ICD-10-CM

## 2020-09-29 DIAGNOSIS — Z23 NEED FOR PROPHYLACTIC VACCINATION AND INOCULATION AGAINST INFLUENZA: Primary | ICD-10-CM

## 2020-09-29 LAB — CORTIS SERPL-MCNC: 10.9 UG/DL (ref 4–22)

## 2020-09-29 PROCEDURE — 82533 TOTAL CORTISOL: CPT | Performed by: INTERNAL MEDICINE

## 2020-09-29 PROCEDURE — 84132 ASSAY OF SERUM POTASSIUM: CPT | Performed by: INTERNAL MEDICINE

## 2020-09-29 PROCEDURE — 82565 ASSAY OF CREATININE: CPT | Performed by: INTERNAL MEDICINE

## 2020-09-29 PROCEDURE — 90682 RIV4 VACC RECOMBINANT DNA IM: CPT

## 2020-09-29 PROCEDURE — 84443 ASSAY THYROID STIM HORMONE: CPT | Performed by: INTERNAL MEDICINE

## 2020-09-29 PROCEDURE — 90472 IMMUNIZATION ADMIN EACH ADD: CPT

## 2020-09-29 PROCEDURE — 90471 IMMUNIZATION ADMIN: CPT

## 2020-09-29 PROCEDURE — 82947 ASSAY GLUCOSE BLOOD QUANT: CPT | Performed by: INTERNAL MEDICINE

## 2020-09-29 PROCEDURE — 99207 ZZC NO CHARGE NURSE ONLY: CPT

## 2020-09-29 PROCEDURE — 90750 HZV VACC RECOMBINANT IM: CPT

## 2020-09-29 PROCEDURE — 36415 COLL VENOUS BLD VENIPUNCTURE: CPT | Performed by: INTERNAL MEDICINE

## 2020-09-29 PROCEDURE — 80061 LIPID PANEL: CPT | Performed by: INTERNAL MEDICINE

## 2020-09-30 LAB
CHOLEST SERPL-MCNC: 245 MG/DL
CREAT SERPL-MCNC: 0.82 MG/DL (ref 0.52–1.04)
GFR SERPL CREATININE-BSD FRML MDRD: 82 ML/MIN/{1.73_M2}
GLUCOSE SERPL-MCNC: 94 MG/DL (ref 70–99)
HDLC SERPL-MCNC: 44 MG/DL
LDLC SERPL CALC-MCNC: 149 MG/DL
NONHDLC SERPL-MCNC: 201 MG/DL
POTASSIUM SERPL-SCNC: 4.2 MMOL/L (ref 3.4–5.3)
TRIGL SERPL-MCNC: 261 MG/DL
TSH SERPL DL<=0.005 MIU/L-ACNC: 1.91 MU/L (ref 0.4–4)

## 2020-10-03 NOTE — PROGRESS NOTES
"Laya Zuleta is a 50 year old female who is being evaluated via a billable video visit.      The patient has been notified of following:     \"This video visit will be conducted via a call between you and your physician/provider. We have found that certain health care needs can be provided without the need for an in-person physical exam.  This service lets us provide the care you need with a video conversation.  If a prescription is necessary we can send it directly to your pharmacy.  If lab work is needed we can place an order for that and you can then stop by our lab to have the test done at a later time.    Video visits are billed at different rates depending on your insurance coverage.  Please reach out to your insurance provider with any questions.    If during the course of the call the physician/provider feels a video visit is not appropriate, you will not be charged for this service.\"    Patient has given verbal consent for Video visit? Yes  How would you like to obtain your AVS? MyChart  If you are dropped from the video visit, the video invite should be resent to: Text to cell phone: [phone]  Will anyone else be joining your video visit? No        Video-Visit Details    Type of service:  Video Visit    Video Start Time: 1:01 PM  Video End Time: 1:46 PM    Originating Location (pt. Location): Home    Distant Location (provider location):  [facility] [place]     Platform used for Video Visit: Suha Bahena MD          New Medical Weight Management Consult    PATIENT:  Laya Zuleta  MRN:         [id number]  :         1970  YOUSIF:         10/5/2020    Dear uSzanna Smith CNP,    I had the pleasure of seeing your patient, Laya Zuleta. Full intake/assessment was done to determine barriers to weight loss success and develop a treatment plan. Laya Zuleta is a 50 year old female interested in treatment of medical problems " "associated with excess weight. She has a height of 5' 7.5\"[pt reported[, a weight of 193 lbs 0 oz, and the calculated Body mass index is 29.78 kg/m .    ASSESSMENT/PLAN:    Overweight (BMI 25.0-29.9)  We discussed healthy habits to assist with weight loss. We discussed medication that may assist with weight loss. She will try planning her meals ahead of time and will start doing some weight training again.  Phentermine was prescribed as she did well on it in the past.. Risks/ benefits and possible side effects were discussed and questions were answered. Written information was given. We could add topamax if needed and she would also be a candidate for adding naltrexone to her wellbutrin.      Premenstrual dysphoric disorder  She is now in menopause. She feels her mood is stable. She may try going off of her celexa as for some people it can cause weight gain.      She has the following co-morbidities:       9/29/2020   I have the following health issues associated with obesity: None of the above   I have the following symptoms associated with obesity: Knee Pain       Patient Goals 9/29/2020   I am interested in having a healthier weight to diminish current health problems: Yes   I am interested in having a healthier weight in order to prevent future health problems: Yes   I am interested in having a healthier weight in order to have a future surgery: No       Referring Provider 9/29/2020   Please name the provider who referred you to Medical Weight Management.  If you do not know, please answer: \"I Don't Know\". Suzanna Smith, LANA       Weight History 9/29/2020   How concerned are you about your weight? Somewhat Concerned   Would you describe your weight gain as gradual? Yes   I became overweight: After Pregnancy   The following factors have contributed to my weight gain:  Eating Too Much, Lack of Exercise, Other   Please list the other factors.  Father dies, child illness   I have tried the following methods to lose " weight: Watching Portions or Calories, Exercise, Weight Watchers, Nutrisystem, Slim Fast or Other Liquid Diets, Medications, Fasting   Please list the medications you have tried.  Phentermine- helped in the past, she still takes it very sportadically, her appetite is decreased when she takes it, topiramate- only tried for a few days and didn't notice a response   My lowest weight since age 17 was: 136   My highest weight since age 17 was: 195   The most weight I have ever lost was: (lbs) 20   I have the following family history of obesity/being overweight:  One or more of my siblings are overweight   Has anyone in your family had weight loss surgery? No   How has your weight changed over the last year?  Gained   How many pounds? 10     Her father passed 3 years ago and she has struggled more since then.    Diet Recall Review with Patient 9/29/2020   Do you typically eat breakfast? Yes   If you do eat breakfast, what types of food do you eat? turkey sausage egg english muffin- Naseem Bi Delite sandwich   Do you typically eat lunch? Yes   If you do eat lunch, what types of food do you typically eat?  Snacks- kenia crackers and sugar free cool whip, piece of cheese, cottage cheese, mini chicken wontons   Do you typically eat supper? Yes   If you do eat supper, what types of food do you typically eat? salads, pasta once in awhile, chicken   Do you typically eat snacks? Yes   If you do snack, what types of food do you typically eat? pirates booty, sugar free pudding, fruit   Do you like vegetables?  Yes   Do you drink water? Yes- 2-3 glasses per day   How many glasses of juice do you drink in a typical day? 0   How many of glasses of milk do you drink in a typical day? 0   If you do drink milk, what type? N/A   How many 8oz glasses of sugar containing drinks such as Domo-Aid/sweet tea do you drink in a day? 0   How many cans/bottles of sugar pop/soda/tea/sports drinks do you drink in a day? 0   How many cans/bottles of  diet pop/soda/tea or sports drink do you drink in a day? 0   How often do you have a drink of alcohol? 2-3 TImes a Week, more lately   If you do drink, how many drinks might you have in a day? 1 or 2       Eating Habits 9/29/2020   Generally, my meals include foods like these: bread, pasta, rice, potatoes, corn, crackers, sweet dessert, pop, or juice. Once a Week   Generally, my meals include foods like these: fried meats, brats, burgers, french fries, pizza, cheese, chips, or ice cream. Once a Week   Eat fast food (like McDonalds, [employer], Taco Bell). Never   Eat at a buffet or sit-down restaurant. Never   Eat most of my meals in front of the TV or computer. Almost Everyday   Often skip meals, eat at random times, have no regular eating times. A Few Times a Week   Rarely sit down for a meal but snack or graze throughout.  Almost Everyday   Eat extra snacks between meals. Almost Everyday   Eat most of my food at the end of the day. A Few Times a Week   Eat in the middle of the night or wake up at night to eat. Never   Eat extra snacks to prevent or correct low blood sugar. Never   Eat to prevent acid reflux or stomach pain. Never   Worry about not having enough food to eat. Never   Have you been to the food shelf at least a few times this year? No   I eat when I am depressed. Less Than Weekly   I eat when I am stressed. Less Than Weekly   I eat when I am bored. A Few Times a Week   I eat when I am anxious. Never   I eat when I am happy or as a reward. Less Than Weekly   I feel hungry all the time even if I just have eaten. Never   Feeling full is important to me. Never   I finish all the food on my plate even if I am already full. A Few Times a Week   I can't resist eating delicious food or walk past the good food/smell. Less Than Weekly   I eat/snack without noticing that I am eating. Never   I eat when I am preparing the meal. A Few Times a Week   I eat more than usual when I see others eating. Never   I have  trouble not eating sweets, ice cream, cookies, or chips if they are around the house. A Few Times a Week   I think about food all day. Never   What foods, if any, do you crave? Sweets/Candy/Chocolate   Please list any other foods you crave? crackers, cheese     Meals per week not prepared at home per week: 2    Amount of Food 9/29/2020   I make myself vomit what I have eaten or use laxatives to get rid of food. Never   I eat a large amount of food, like a loaf of bread, a box of cookies, a pint/quart of ice cream, all at once. Never   I eat a large amount of food even when I am not hungry. Never   I eat alone because I feel embarrassed and do not want others to see how much I have eaten. Never   I eat until I am uncomfortably full. Monthly   I feel bad, disgusted, or guilty after I overeat. Never   I make myself vomit what I have eaten or use laxatives to get rid of food. Never       Activity/Exercise History 9/29/2020   How much of a typical 12 hour day do you spend sitting? Most of the Day   How much of a typical 12 hour day do you spend lying down? Less Than Half the Day   How much of a typical day do you spend walking/standing? Less Than Half the Day   How many hours (not including work) do you spend on the TV/Video Games/Computer/Tablet/Phone? 2-3 Hours   How many times a week are you active for the purpose of exercise? 4-5 TImes a Week: walks, belongs to the local community center   What keeps you from being more active? Lack of Time   How many total minutes do you spend doing some activity for the purpose of exercising when you exercise? 45 minutes       PAST MEDICAL HISTORY:  Past Medical History:   Diagnosis Date     Acne 11/29/2010     CARDIOVASCULAR SCREENING; LDL GOAL LESS THAN 160 11/29/2010       Work/Social History Reviewed With Patient 9/29/2020   My employment status is: Full-Time   My job is:    How much of your job is spent on the computer or phone? 100%   How many hours do you  spend commuting to work daily?  0   What is your marital status? /In a Relationship   If in a relationship, is your significant other overweight? Yes   Do you have children? Yes   If you have children, are they overweight? No   Who do you live with?  My  and daughter   Are they supportive of your health goals? Yes   Who does the food shopping?  me and        Mental Health History Reviewed With Patient 9/29/2020   Have you ever been physically or sexually abused? No   If yes, do you feel that the abuse is affecting your weight? N/A   If yes, would you like to talk to a counselor about the abuse? N/A   How often in the past 2 weeks have you felt little interest or pleasure in doing things? Not at all   Over the past 2 weeks how often have you felt down, depressed, or hopeless? Not at all       Sleep History Reviewed With Patient 9/29/2020   How many hours do you sleep at night? 9   Do you think that you snore loudly or has anybody ever heard you snore loudly (louder than talking or so loud it can be heard behind a shut door)? No   Has anyone seen or heard you stop breathing during your sleep? No   Do you often feel tired, fatigued, or sleepy during the day? No   Do you have a TV/Computer in your bedroom? No       MEDICATIONS:   Current Outpatient Medications   Medication Sig Dispense Refill     buPROPion (WELLBUTRIN XL) 150 MG 24 hr tablet Take 1 tablet (150 mg) by mouth every morning 180 tablet 3     Calcium Carbonate-Vitamin D (CALCIUM 600 + D) 600-400 MG-UNIT tablet Take 1 tablet by mouth 2 times daily. 100 tablet 3     citalopram (CELEXA) 10 MG tablet Take 1 tablet (10 mg) by mouth daily 90 tablet 3     SPIRONOLACTONE PO Take 75 mg by mouth       ROS  General  Fatigue: sometimes  HEENT  Hx of glaucoma: no  Vision changes: no  Cardiovascular  Chest Pain with Exertion: no  Palpitations: no  Hx of heart disease: no  Pulmonary  Shortness of breath at rest: no  Shortness of breath with exertion:  no  Stop-bang score: na  Trenton Score: na  Gastrointestinal  Heartburn: no  Psychiatric  Moods Stable: yes  Endocrine  Polydipsia: no  Polyuria: no  Neurologic:  Hx of seizures: no  Migraine headaches: no    ALLERGIES:   No Known Allergies    PHYSICAL EXAM:  GENERAL: Healthy, alert and no distress  EYES: Eyes grossly normal to inspection.  No discharge or erythema, or obvious scleral/conjunctival abnormalities.  RESP: No audible wheeze, cough, or visible cyanosis.  No visible retractions or increased work of breathing.    SKIN: Visible skin clear. No significant rash, abnormal pigmentation or lesions.  NEURO: Cranial nerves grossly intact.  Mentation and speech appropriate for age.  PSYCH: Mentation appears normal, affect normal/bright, judgement and insight intact, normal speech and appearance well-groomed.    FOLLOW-UP:   In 1 week with our dietician and in 3 months with myself.    TIME: 45 min spent on evaluation, management, counseling, education, & motivational interviewing with greater than 50 % of the total time was spent on counseling and coordinating care    Sincerely,    EFRAIN Bahena MD

## 2020-10-03 NOTE — PATIENT INSTRUCTIONS
Ish Asif. It was nice meeting you today. Please call [phone] to set up a virtual follow up visit with me.   Melyssa      Eat Better ? Move More ? Live Well    Eat 3 nutrient-rich meals each day     Don t skip meals--it will cause you to overeat later in the day!     Eating fiber (vegetables/fruits/whole grains) and protein with meals helps you stay full longer     Choose foods with less than 10 grams of sugar and 5 grams of fat per serving to prevent excess calories and weight re-gain   Eat around the same times each day to develop a routine eating schedule    Avoid snacking unless physically hungry.   Planned snacks: 1-2 times per day and no more than 150 calories    Eat protein first    Protein helps with healing, maintaining adequate muscle mass, reducing hunger and optimizing nutritional status    Aim for 60-80 grams of protein per day   Fill up on Fiber    Fiber comes from plants--fruits, veggies, whole grains, nuts/seeds and beans    Fiber is low in calories, high in phytonutrients and helps you stay full longer    Aim for 25-35 grams per day by eating fiber with meals and snacks  Eat [id number]    Take 20-30 minutes to eat each meal by taking small bites, chewing foods to applesauce consistency or 20-30 times before you swallow    Eating foods too fast can delay satiety/fullness signals and increase overeating   ? Slow down your eating by using toddler utensils, putting your fork/spoon down between bites and not watching TV or emailing during meals!   Keep a Journal          Writing down what you eat, how you feel and when you are active helps you identify new changes to work on from week to week          Look for ways to cut 100 calories from your current diet 2-3 times per day  Drink 64 ounces of 0-Calorie drinks between meals    Water    Zero calorie Propel  or Vitamin Water      SoBe Lifewater  Zero Calories    Crystal Light , Sugar-Free Domo-Aid , and other sugar-free lemonade or flavored  carson    Keep Caffeine to less than 300mg per day ie: 3-6oz cups coffee     Work up to 45-60 minutes of physical activity most days of the week    Helps with losing weight and prevent regaining those extra pounds!     Do a combo of cardio (walking/water exercises) and strength training (lifting weights/Vinyasa yoga)    Avoid Mindless Eating    Be present when you eat--take note of the smell, taste and quality of your food    Make a list of alternative activities you could do to prevent eating out of boredom/stress  ? Go for a walk, call a friend, chew gum, paint your nails, re-organize the garage, etc      LEAN PROTEIN SOURCES      Protein Source Portion Calories Grams of Protein                           Nonfat, plain Greek yogurt    (10 grams sugar or less) 3/4 cup (6 oz)  12-17   Light Yogurt (10 grams sugar or less) 3/4 cup (6 oz)  6-8   Protein Shake 1 shake 110-180 15-30   Skim/1% Milk or lactose-free milk 1 cup ( 8 oz)  8   Plain or light, flavored soymilk 1 cup  7-8   Plain or light, hemp milk 1 cup 110 6   Fat Free or 1% Cottage Cheese 1/2 cup 90 15   Part skim ricotta cheese 1/2 cup 100 14   Part skim or reduced fat cheese slices 1 ounce 65-80 8     Mozzarella String Cheese 1 80 8   Canned tuna, chicken, crab or salmon  (canned in water)  1/2 cup 100 15-20   White fish (broiled, grilled, baked) 3 ounces 100 21   Benavides/Tuna (broiled, grilled, baked) 3 ounces 150-180 21   Shrimp, Scallops, Lobster, Crab 3 ounces 100 21   Pork loin, Pork Tenderloin 3 ounces 150 21   Boneless, skinless chicken /turkey breast                          (broiled, grilled, baked) 3 ounces 120 21   [place], [place], [place], and Venison 3 ounces 120 21   Lean cuts of red meat and pork (sirloin,   round, tenderloin, flank, ground 93%-96%) 3 ounces 170 21   Lean or Extra Lean Ground Turkey 1/2 cup 150 20   90-95% Lean [place] Burger 1 ricarda 140-180 21   Low-fat casserole with lean meat 3/4 cup 200 17   Luncheon  Meats                                                        (turkey, lean ham, roast beef, chicken) 3 ounces 100 21   Egg (boiled, poached, scrambled) 1 Egg 60 7   Egg Substitute 1/2 cup 70 10   Nuts (limit to 1 serving per day)  3 Tbsp. 150 7   Nut [address] (peanut, almond)  Limit to 1 serving or less daily 1 Tbsp. 90 4   Soy Burger (varies) 1  15   Garbanzo, Black, Oneil Beans 1/2 cup 110 7   Refried Beans 1/2 cup 100 7   Kidney and Lima beans 1/2 cup 110 7   Tempeh 3 oz 175 18   Vegan crumbles 1/2 cup 100 14   Tofu 1/2 cup 110 14   Chili (beans and extra lean beef or turkey) 1 cup 200 23   Lentil Stew/Soup 1 cup 150 12   Black Bean Soup 1 cup 175 12

## 2020-10-05 ENCOUNTER — TELEPHONE (OUTPATIENT)
Dept: SURGERY | Facility: CLINIC | Age: 50
End: 2020-10-05

## 2020-10-05 ENCOUNTER — VIRTUAL VISIT (OUTPATIENT)
Dept: SURGERY | Facility: CLINIC | Age: 50
End: 2020-10-05
Payer: COMMERCIAL

## 2020-10-05 VITALS — WEIGHT: 193 LBS | HEIGHT: 68 IN | BODY MASS INDEX: 29.25 KG/M2

## 2020-10-05 DIAGNOSIS — E66.3 OVERWEIGHT (BMI 25.0-29.9): Primary | ICD-10-CM

## 2020-10-05 DIAGNOSIS — F32.81 PREMENSTRUAL DYSPHORIC DISORDER: ICD-10-CM

## 2020-10-05 PROCEDURE — 99204 OFFICE O/P NEW MOD 45 MIN: CPT | Mod: 95 | Performed by: FAMILY MEDICINE

## 2020-10-05 RX ORDER — PHENTERMINE HYDROCHLORIDE 37.5 MG/1
TABLET ORAL
Qty: 90 TABLET | Refills: 0 | Status: SHIPPED | OUTPATIENT
Start: 2020-10-05 | End: 2021-01-04

## 2020-10-05 ASSESSMENT — MIFFLIN-ST. JEOR: SCORE: 1531

## 2020-10-05 NOTE — TELEPHONE ENCOUNTER
Called pharmacy as directed.  TORB for above rx given to pharmacist Noah, read back for accuracy.  Angela Awad RN on 10/5/2020 at 3:10 PM

## 2020-10-05 NOTE — ASSESSMENT & PLAN NOTE
She is now in menopause. She feels her mood is stable. She may try going off of her celexa as for some people it can cause weight gain.

## 2020-10-05 NOTE — ASSESSMENT & PLAN NOTE
We discussed healthy habits to assist with weight loss. We discussed medication that may assist with weight loss. She will try planning her meals ahead of time and will start doing some weight training again.  Phentermine was prescribed as she did well on it in the past.. Risks/ benefits and possible side effects were discussed and questions were answered. Written information was given. We could add topamax if needed and she would also be a candidate for adding naltrexone to her wellbutrin.

## 2020-10-05 NOTE — Clinical Note
Dear Sarah, Suzanna Asif,    Thank you for referring Laya for medical bariatric consultation. I have enclosed my office note for your review. Please contact me if you have any questions or concerns.     Thank you,  EFRAIN Bahena MD

## 2020-10-09 ENCOUNTER — VIRTUAL VISIT (OUTPATIENT)
Dept: PSYCHOLOGY | Facility: CLINIC | Age: 50
End: 2020-10-09
Payer: COMMERCIAL

## 2020-10-09 DIAGNOSIS — F43.22 ADJUSTMENT DISORDER WITH ANXIETY: Primary | ICD-10-CM

## 2020-10-09 PROCEDURE — 90834 PSYTX W PT 45 MINUTES: CPT | Mod: GT | Performed by: MARRIAGE & FAMILY THERAPIST

## 2020-10-09 NOTE — PROGRESS NOTES
Progress Note    Patient Name: Laya Zuleta  Date: October 9, 2020          Service Type: Individual  Video Visit: Yes     Telemedicine Visit: The patient's condition can be safely assessed and treated via synchronous audio and visual telemedicine encounter.    Reason for Telemedicine Visit: Services only offered telehealth and due to COVID-19 restrictions  Originating Site (Patient Location): Patient's home  Distant Site (Provider Location): Provider Remote Setting  Consent:  The patient/guardian has verbally consented to: the potential risks and benefits of telemedicine (video visit) versus in person care; bill my insurance or make self-payment for services provided; and responsibility for payment of non-covered services.   Mode of Communication:  Video Conference via Doxy  As the provider I attest to compliance with applicable laws and regulations related to telemedicine.      Session Start Time: 9:30  Session End Time: 10:15     Session Length: 45 min    Session #: 13    Attendees: Client attended alone     Treatment Plan Last Reviewed: September 11, 2020     DATA  Interactive Complexity: No  Crisis: No       Progress Since Last Session (Related to Symptoms / Goals / Homework):   Symptoms: Improving .    Homework: Achieved / completed to satisfaction with strong self-care performance.      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     Intervention:   Navigating communicating and compliance with distance learning of her daughter. Client reports she is challenged by her daughter's lack of compliance with distance learning, all in the context of remote full time job, pandemic fears, and social distancing/isolation. She says she would like more support from her . Discussed her current attention to weight loss, enlisting help from her PCP and nutritionist. Processed emotions in session, validated, supportive counseling.    ASSESSMENT: Current Emotional / Mental Status (status of significant symptoms):   Risk status (Self / Other harm or suicidal ideation)   Patient denies current fears or concerns for personal safety.   Patient denies current or recent suicidal ideation or behaviors.   Patientdenies current or recent homicidal ideation or behaviors.   Patient denies current or recent self injurious behavior or ideation.   Patient denies other safety concerns.   Patient reports there has been no change in risk factors since their last session.     Patientreports there has been no change in protective factors since their last session.     Recommended that patient call 911 or go to the local ED should there be a change in any of these risk factors.     Appearance:   Appropriate    Eye Contact:   Good    Psychomotor Behavior: Normal    Attitude:   Cooperative    Orientation:   All   Speech    Rate / Production: Normal     Volume:  Normal    Mood:    Anxious    Affect:    Appropriate    Thought Content:  Clear    Thought Form:  Coherent  Logical    Insight:    Good      Medication Review:   No changes to current psychiatric medication(s)     Medication Compliance:   Yes     Changes in Health Issues:   None reported     Chemical Use Review:   Substance Use: Chemical use reviewed, no active concerns identified      Tobacco Use: No current tobacco use.      Diagnosis:  Adjustment disorder with anxiety    Collateral Reports  Completed:   Not Applicable    PLAN: (Patient Tasks / Therapist Tasks / Other)  Client will discussion getting more support from  and follow through with weight loss assistance.        Chung Martinez MA, LMFT                                                         ______________________________________________________________________                                                 Treatment Plan    Client's Name: Laya Zuleta  YOB: 1970    Date: September 11, 2020     DSM-V Diagnoses: 309.24 - Adjustment Disorder with Anxiety  ; V71.09 - No Diagnosis  Psychosocial / Contextual Factors: daughter with bone disease  WHODAS: 13    Referral / Collaboration:  Referral to another professional/service is not indicated at this time..    Anticipated number of session or this episode of care: ongoing    Measurable Treatment Goal(s) related to diagnosis / functional impairment(s)   I will know I've met my goal when I learn tools to cope with and lower my anxiety.     Goal 1: Client will experience a significant reduction in ZAIRA-7 scores.     Objective #A   Client will learn and integrate DBT/CBT strategies to more effectively manage emotions and relationships.   Status: Continued: September 11, 2020     Intervention(s)   Therapist will teach emotional regulation, distress tolerance, interpersonal effectiveness, and mindfulness skills. Meditation resources will be offered. Therapist will teach TIPP skills: Temperature, Intense exercise, Paced breathing, and Paired Muscle Relaxation.    Objective #B   Client will learn to identify negative thoughts that are present and use cbt strategies to diffuse anxiety.  Status: Continued: September 11, 2020     Intervention(s)   Therapist will teach cognitive distortion identification and coping strategies.    Objective #C   Client will engage in positive self-care.  Status: Continued: September 11, 2020     Intervention(s)   Therapist will teach self-care  goals:  Maintain balance in schedule (time for self/others, relaxation/activities, leisure/tasks, home/out of the house), assert needs and set limits with others, challenge negative thoughts/use affirming and encouraging self-talk, engage with support people on regular basis, practice behavior activation behaviors (sleep hygiene, balanced eating, physical activity, medical needs, personal hygiene), acknowledge and accept your feelings.      Patient has reviewed and agreed to the above plan.    Rich Martinez MA, LMFT  September 11, 2020

## 2020-10-13 ENCOUNTER — VIRTUAL VISIT (OUTPATIENT)
Dept: SURGERY | Facility: CLINIC | Age: 50
End: 2020-10-13
Payer: COMMERCIAL

## 2020-10-13 DIAGNOSIS — E66.3 OVERWEIGHT (BMI 25.0-29.9): ICD-10-CM

## 2020-10-13 PROCEDURE — 97802 MEDICAL NUTRITION INDIV IN: CPT | Mod: GT | Performed by: DIETITIAN, REGISTERED

## 2020-10-13 NOTE — PROGRESS NOTES
"Laya Zuleta is a 50 year old female who is being evaluated via a billable video visit.      The patient has been notified of following:     \"This video visit will be conducted via a call between you and your physician/provider. We have found that certain health care needs can be provided without the need for an in-person physical exam.  This service lets us provide the care you need with a video conversation.  If a prescription is necessary we can send it directly to your pharmacy.  If lab work is needed we can place an order for that and you can then stop by our lab to have the test done at a later time.    Video visits are billed at different rates depending on your insurance coverage.  Please reach out to your insurance provider with any questions.    If during the course of the call the physician/provider feels a video visit is not appropriate, you will not be charged for this service.\"    Patient has given verbal consent for Video visit? Yes  How would you like to obtain your AVS? MyChart  If you are dropped from the video visit, the video invite should be resent to: Text to cell phone: [phone]  Will anyone else be joining your video visit? No        Video-Visit Details    Type of service:  Video Visit    Video Start Time: 1:32pm  Video End Time: 2:05pm    Originating Location (pt. Location): Home    Distant Location (provider location):  [facility] [place]     Platform used for Video Visit: [facility] WEIGHT LOSS INITIAL EVALUATION  DIAGNOSIS:  Overweight    NUTRITION HISTORY:  Diet recall per MD visit on 10/5/2020 as follows:  Diet Recall Review with Patient 9/29/2020   Do you typically eat breakfast? Yes   If you do eat breakfast, what types of food do you eat? turkey sausage egg english muffin- Naseem Bi Delite sandwich   Do you typically eat lunch? Yes   If you do eat lunch, what types of food do you typically eat?  Snacks- kenia crackers and sugar " "free cool whip, piece of cheese, cottage cheese, mini chicken wontons   Do you typically eat supper? Yes   If you do eat supper, what types of food do you typically eat? salads, pasta once in awhile, chicken   Do you typically eat snacks? Yes   If you do snack, what types of food do you typically eat? pirates booty, sugar free pudding, fruit   Do you like vegetables?  Yes   Do you drink water? Yes- 2-3 glasses per day   How many glasses of juice do you drink in a typical day? 0   How many of glasses of milk do you drink in a typical day? 0   If you do drink milk, what type? N/A   How many 8oz glasses of sugar containing drinks such as Domo-Aid/sweet tea do you drink in a day? 0   How many cans/bottles of sugar pop/soda/tea/sports drinks do you drink in a day? 0   How many cans/bottles of diet pop/soda/tea or sports drink do you drink in a day? 0   How often do you have a drink of alcohol? 2-3 TImes a Week, more lately   If you do drink, how many drinks might you have in a day? 1 or 2       Other: Pt feels her biggest challenges are consistency with meals. Will occasionally skip meals or go too long between meals. Will be starting Phentermine.     ANTHROPOMETRICS:  Height: 5' 7.5\"   Weight: 193 lbs 0 oz   BMI:  Body mass index is 29.78 kg/m .  NUTRITION DIAGNOSIS:   Overweight, related to excess energy intake as evidence by BMI of  29.78 kg/m2.   NUTRITION INTERVENTIONS  Nutrition Prescription:  Recommend modified energy- nutrient intake  Implementation:  Nutrition Education (Content):    Discussed portion sizes and plate method    Provided handouts: Tips for Weight Loss and Weight Management, Protein Sources for Weight Loss, Tips for Increasing Fiber, Plate Method    Nutrition Education (Application):     Patient to practice goals as stated below    Patient verbalizes understanding of diet by stating she will eat balanced meals at regular intervals    Anticipate good compliance    Goals:  Eat 3 meals/day at regular " intervals  Include protein at each meal  Have a fruit and/or vegetable at each meal     FOLLOW UP AND MONITORING:    Other  - follow up in 4-8 weeks.     Ingrid Mendez RD, LD  Clinical Dietitian     TIME SPENT WITH PATIENT:   33 minutes

## 2020-10-14 ENCOUNTER — ANCILLARY PROCEDURE (OUTPATIENT)
Dept: MAMMOGRAPHY | Facility: CLINIC | Age: 50
End: 2020-10-14
Payer: COMMERCIAL

## 2020-10-14 VITALS — BODY MASS INDEX: 29.25 KG/M2 | WEIGHT: 193 LBS | HEIGHT: 68 IN

## 2020-10-14 DIAGNOSIS — Z12.31 VISIT FOR SCREENING MAMMOGRAM: ICD-10-CM

## 2020-10-14 PROCEDURE — 77067 SCR MAMMO BI INCL CAD: CPT | Performed by: RADIOLOGY

## 2020-10-14 PROCEDURE — 77063 BREAST TOMOSYNTHESIS BI: CPT | Performed by: RADIOLOGY

## 2020-10-14 ASSESSMENT — MIFFLIN-ST. JEOR: SCORE: 1531

## 2020-11-01 ENCOUNTER — VIRTUAL VISIT (OUTPATIENT)
Dept: FAMILY MEDICINE | Facility: OTHER | Age: 50
End: 2020-11-01
Payer: COMMERCIAL

## 2020-11-01 PROCEDURE — 99421 OL DIG E/M SVC 5-10 MIN: CPT | Performed by: NURSE PRACTITIONER

## 2020-11-01 NOTE — PROGRESS NOTES
"Date: 2020 10:06:32  Clinician: Tory Galdamez  Clinician NPI: [id number]  Patient: Laya Zuleta  Patient : 1970  Patient Address: [address] [place] [address]  Patient Phone: [phone]  Visit Protocol: URI  Patient Summary:  Laya is a 50 year old ( : 1970 ) female who initiated a [facility] Visit for COVID-19 (Coronavirus) evaluation and screening. When asked the question \"Please sign me up to receive news, health information and promotions from [facility].\", Laya responded \"Yes\".    When asked when her symptoms started, Laya reported that she does not have any symptoms.   She denies having recent facial or sinus surgery in the past 60 days and taking antibiotic medication in the past month.    Pertinent COVID-19 (Coronavirus) information  Laya does not work or volunteer as healthcare worker or a . In the past 14 days, Laya has not worked or volunteered at a healthcare facility or group living setting.   In the past 14 days, she also has not lived in a congregate living setting.   Laya has had a close contact with a laboratory-confirmed COVID-19 patient in the last 14 days. Date Laya was exposed to the laboratory-confirmed COVID-19 patient: 10/27/2020   Additional information about contact with COVID-19 (Coronavirus) patient as reported by the patient (free text): My  tested positive on 2020. His exposure was 10/26, we slept in the same bed. We played cribbage on 10/31 at the same table.   Laya is living in the same household with the COVID-19 positive patient. She was in an enclosed space for greater than 15 minutes with the COVID-19 patient.   During the encounter, neither were wearing masks.   Since 2019, Laya has not been diagnosed with lab-confirmed COVID-19 test and has not had upper respiratory infection or influenza-like illness.   Pertinent medical history  Laya does not get yeast " infections when she takes antibiotics.   Laya does not need a return to work/school note.   Weight: 188 lbs   Laya does not smoke or use smokeless tobacco.   Weight: 188 lbs    MEDICATIONS: No current medications, ALLERGIES: NKDA  Clinician Response:  Dear Laya,   Based on your exposure to COVID-19 (coronavirus), we would like to test you for this virus.  1. Please call [phone] to schedule your visit. Explain that you were referred by [facility] to have a COVID-19 test. Be [facility].   The following will serve as your written order for this COVID Test, ordered by me, for the indication of suspected COVID [Z20.828]: The test will be ordered in [facility], our electronic health record, after you are scheduled. It will show as ordered and authorized by Bhavesh Jay MD.  Order: COVID-19 (coronavirus) PCR for ASYMPTOMATIC EXPOSURE testing from [facility].   If you know you have had close contact with someone who tested positive, you should be quarantined for 14 days after this exposure. You should stay in quarantine for the14 days even if the covid test is negative, the optimal time to test after exposure is 5-7 days from the exposure  Quarantine means   What should I do?  For safety, it's very important to follow these rules. Do this for 14 days after the date you were last exposed to the virus..  Stay home and away from others. Don't go to school or anywhere else. Generally quarantine means staying home from work but there are some exceptions to this. Please contact your workplace.   No hugging, kissing or shaking hands.  Don't let anyone visit.  Cover your mouth and nose with a mask, tissue or washcloth to avoid spreading germs.  Wash your hands and face often. Use soap and water.  What are the symptoms of COVID-19?  The most common symptoms are cough, fever and trouble breathing. Less common symptoms include headache, body aches, fatigue (feeling very tired), chills, sore throat,  stuffy or runny nose, diarrhea (loose poop), loss of taste or smell, belly pain, and nausea or vomiting (feeling sick to your stomach or throwing up).  After 14 days, if you have still don't have symptoms, you likely don't have this virus.  If you develop symptoms, follow these guidelines.  If you're normally healthy: Please start another [facility] visit to report your symptoms. Go to [email or web address].  If you have a serious health problem (like cancer, heart failure, an organ transplant or kidney disease): Call your specialty clinic. Let them know that you might have COVID-19.  2. When it's time for your COVID test:  Stay at least 6 feet away from others. (If someone will drive you to your test, stay in the backseat, as far away from the  as you can.)  Cover your mouth and nose with a mask, tissue or washcloth.  Go straight to the testing site. Don't make any stops on the way there or back.  Please note  Caregivers in these groups are at risk for severe illness due to COVID-19:  o People 65 years and older  o People who live in a nursing home or long-term care facility  o People with chronic disease (lung, heart, cancer, diabetes, kidney, liver, immunologic)  o People who have a weakened immune system, including those who:  Are in cancer treatment  Take medicine that weakens the immune system, such as corticosteroids  Had a bone marrow or organ transplant  Have an immune deficiency  Have poorly controlled HIV or AIDS  Are obese (body mass index of 40 or higher)  Smoke regularly  Where can I get more information?  [facility] -- About COVID-19: [email or web address]  CDC -- What to Do If You're Sick: www.cdc.gov/coronavirus/2019-ncov/about/steps-when-sick.html  CDC -- Ending Home Isolation: www.cdc.gov/coronavirus/2019-ncov/hcp/disposition-in-home-patients.html  CDC -- Caring for Someone: www.cdc.gov/coronavirus/2019-ncov/if-you-are-sick/care-for-someone.html  [facility] -- Interim Guidance for [facility]  Discharge to Home: www.health.[facility].[place]/diseases/coronavirus/hcp/hospdischarge.pdf  [facility] clinical trials (COVID-19 research studies): clinicalaffairs.[facility]/n-clinical-trials  Below are the COVID-19 hotlines at the [place] Department of Health ([facility]). Interpreters are available.  For health questions: Call 679-437-4930 or 1-679.138.2084 (7 a.m. to 7 p.m.)  For questions about schools and childcare: Call 751-427-4325 or 1-129.881.4002 (7 a.m. to 7 p.m.)    Diagnosis: Worries  Diagnosis ICD: R45.82

## 2020-11-20 ENCOUNTER — VIRTUAL VISIT (OUTPATIENT)
Dept: PSYCHOLOGY | Facility: CLINIC | Age: 50
End: 2020-11-20
Payer: COMMERCIAL

## 2020-11-20 DIAGNOSIS — F43.22 ADJUSTMENT DISORDER WITH ANXIETY: Primary | ICD-10-CM

## 2020-11-20 PROCEDURE — 90834 PSYTX W PT 45 MINUTES: CPT | Mod: GT | Performed by: MARRIAGE & FAMILY THERAPIST

## 2020-11-20 NOTE — PROGRESS NOTES
Progress Note    Patient Name: Laya Zuleta  Date: November 20, 2020           Service Type: Individual  Video Visit: Yes     Telemedicine Visit: The patient's condition can be safely assessed and treated via synchronous audio and visual telemedicine encounter.    Reason for Telemedicine Visit: Services only offered telehealth and due to COVID-19 restrictions  Originating Site (Patient Location): Patient's home  Distant Site (Provider Location): Provider Remote Setting  Consent:  The patient/guardian has verbally consented to: the potential risks and benefits of telemedicine (video visit) versus in person care; bill my insurance or make self-payment for services provided; and responsibility for payment of non-covered services.   Mode of Communication:  Video Conference via Doxy  As the provider I attest to compliance with applicable laws and regulations related to telemedicine.      Session Start Time: 9:30  Session End Time: 10:15     Session Length: 45 min    Session #: 14    Attendees: Client attended alone     Treatment Plan Last Reviewed: September 11, 2020     DATA  Interactive Complexity: No  Crisis: No       Progress Since Last Session (Related to Symptoms / Goals / Homework):   Symptoms: Improving .    Homework: Achieved / completed to satisfaction with strong self-care performance.      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     Intervention:   Distress tolerance and acceptance strategies. Client reports she she has been challenged by her 's bout with covid. She says this has been a major stressor in their home. She says she has tested negative so far. Processed emotions in session, validated, supportive counseling.    ASSESSMENT: Current Emotional / Mental Status (status of significant symptoms):   Risk status (Self / Other harm or suicidal ideation)   Patient denies current fears or concerns for personal safety.   Patient denies current or recent suicidal ideation or behaviors.   Patientdenies current or recent homicidal ideation or behaviors.   Patient denies current or recent self injurious behavior or ideation.   Patient denies other safety concerns.   Patient reports there has been no change in risk factors since their last session.     Patientreports there has been no change in protective factors since their last session.     Recommended that patient call 911 or go to the local ED should there be a change in any of these risk factors.     Appearance:   Appropriate    Eye Contact:   Good    Psychomotor Behavior: Normal    Attitude:   Cooperative    Orientation:   All   Speech    Rate / Production: Normal     Volume:  Normal    Mood:    Anxious    Affect:    Appropriate    Thought Content:  Clear    Thought Form:  Coherent  Logical    Insight:    Good      Medication Review:   No changes to current psychiatric medication(s)     Medication Compliance:   Yes     Changes in Health Issues:   None reported     Chemical Use Review:   Substance Use: Chemical use reviewed, no active concerns identified      Tobacco Use: No current tobacco use.      Diagnosis:  Adjustment disorder with anxiety    Collateral Reports Completed:   Not Applicable    PLAN: (Patient Tasks / Therapist Tasks / Other)  Client will use distress tolerance and acceptance strategies to weather the stresses of the pandemic at work and at  home.        Chung Martinez MA, LMFT                                                         ______________________________________________________________________                                                 Treatment Plan    Client's Name: Laya Zuleta  YOB: 1970    Date: September 11, 2020     DSM-V Diagnoses: 309.24 - Adjustment Disorder with Anxiety  ; V71.09 - No Diagnosis  Psychosocial / Contextual Factors: daughter with bone disease  WHODAS: 13    Referral / Collaboration:  Referral to another professional/service is not indicated at this time..    Anticipated number of session or this episode of care: ongoing    Measurable Treatment Goal(s) related to diagnosis / functional impairment(s)   I will know I've met my goal when I learn tools to cope with and lower my anxiety.     Goal 1: Client will experience a significant reduction in ZAIRA-7 scores.     Objective #A   Client will learn and integrate DBT/CBT strategies to more effectively manage emotions and relationships.   Status: Continued: September 11, 2020     Intervention(s)   Therapist will teach emotional regulation, distress tolerance, interpersonal effectiveness, and mindfulness skills. Meditation resources will be offered. Therapist will teach TIPP skills: Temperature, Intense exercise, Paced breathing, and Paired Muscle Relaxation.    Objective #B   Client will learn to identify negative thoughts that are present and use cbt strategies to diffuse anxiety.  Status: Continued: September 11, 2020     Intervention(s)   Therapist will teach cognitive distortion identification and coping strategies.    Objective #C   Client will engage in positive self-care.  Status: Continued: September 11, 2020     Intervention(s)   Therapist will teach self-care goals:  Maintain balance in schedule (time for self/others, relaxation/activities, leisure/tasks, home/out of the house), assert needs and set limits with others, challenge negative  thoughts/use affirming and encouraging self-talk, engage with support people on regular basis, practice behavior activation behaviors (sleep hygiene, balanced eating, physical activity, medical needs, personal hygiene), acknowledge and accept your feelings.      Patient has reviewed and agreed to the above plan.    Rich Martinez MA, LMFT  September 11, 2020

## 2020-12-14 ENCOUNTER — VIRTUAL VISIT (OUTPATIENT)
Dept: SURGERY | Facility: CLINIC | Age: 50
End: 2020-12-14
Payer: COMMERCIAL

## 2020-12-14 ENCOUNTER — MYC MEDICAL ADVICE (OUTPATIENT)
Dept: PEDIATRICS | Facility: CLINIC | Age: 50
End: 2020-12-14

## 2020-12-14 VITALS — HEIGHT: 68 IN | WEIGHT: 185 LBS | BODY MASS INDEX: 28.04 KG/M2

## 2020-12-14 DIAGNOSIS — E66.3 OVERWEIGHT (BMI 25.0-29.9): ICD-10-CM

## 2020-12-14 PROCEDURE — 97803 MED NUTRITION INDIV SUBSEQ: CPT | Mod: GT | Performed by: DIETITIAN, REGISTERED

## 2020-12-14 ASSESSMENT — MIFFLIN-ST. JEOR: SCORE: 1494.71

## 2020-12-14 NOTE — PROGRESS NOTES
"Laya Zuleta is a 50 year old female who is being evaluated via a billable video visit.      The patient has been notified of following:     \"This video visit will be conducted via a call between you and your physician/provider. We have found that certain health care needs can be provided without the need for an in-person physical exam.  This service lets us provide the care you need with a video conversation.  If a prescription is necessary we can send it directly to your pharmacy.  If lab work is needed we can place an order for that and you can then stop by our lab to have the test done at a later time.    Video visits are billed at different rates depending on your insurance coverage.  Please reach out to your insurance provider with any questions.    If during the course of the call the physician/provider feels a video visit is not appropriate, you will not be charged for this service.\"    Patient has given verbal consent for Video visit? Yes  How would you like to obtain your AVS? MyChart  If you are dropped from the video visit, the video invite should be resent to: Text to cell phone: [phone]  Will anyone else be joining your video visit? No        Video-Visit Details    Type of service:  Video Visit    Video Start Time: 1:04pm  Video End Time: 1:28pm    Originating Location (pt. Location): Home    Distant Location (provider location):  [facility] [place]     Platform used for Video Visit: [facility]    MEDICAL WEIGHT LOSS FOLLOW UP    Reason for visit: medical weight loss     DIAGNOSIS:  Overweight    ANTHROPOMETRICS:  Initial Weight: 193 pounds (10/5/2020)  Current Weight:  185 lbs 0 oz   Weight Change: -8 lbs  BMI: Body mass index is 28.55 kg/m .     Weight Loss Medications:   Phentermine    NUTRITION HISTORY:  Breakfast: Naseem Bryan Delight breakfast sandwich  oatmeal (instant)  egg fritatta (egg + vegetables/fruit)   Lunch: (grazes - see snacks)  Dinner:  " Chicken chili  salad  pasta + vegetables + shrimp   Snacks:  Roverto crackers in whipped cream, fruit, cottage cheese, popcorn, candy occasional   Beverage choices: water/enhanced water (24oz), coffee (16oz, SF creamer), wine (6oz)     Dining Out:  How often do you dine out: 1x/week  What types of food do you order: potbellies sandwich (flatbread), [employer] (wings, salad)      Exercise:  Type: walking/walking dog   Duration: 30-60 minutes  Frequency: daily    Additional Information: Pt successful in adding more protein, fruits and vegetables. Continues to struggle with eating a balanced lunch and instead finds herself grazing. Discussed keeping convenient options on hand/at desk to avoid skipping meal.      EVALUATION/PROGRESS TOWARDS GOALS:  Previous Goals:   Eat 3 meals/day at regular intervals - improving  Include protein at each meal - met  Have a fruit and/or vegetable at each meal - improving    Previous Nutrition Diagnosis:  Overweight related to excess energy intake and self-monitoring deficit as evidenced by BMI of 29.78 kg/m2.  No change, modified below     Current Nutrition Diagnosis:   Overweight related to excess energy intake and self-monitoring deficit as evidenced by BMI of 28.55 kg/m2.    INTERVENTION:    Nutrition Prescription:  Recommend modified nutrient intake by decreasing energy intake.    Implementation:    Nutrition Education (Content):    Discussed previous goals and determined new goals    Encourage physical activity    Supported patient in attempted weight loss and behavior changes    Congratulated patient on successful weight loss     Patient verbalizes understanding of diet by stating she will include protein at lunch and increase hydration    Anticipate fair-good compliance    Goals:  Continue with daily walks, add in Oculus workout 2x/week  Include protein at lunch  Increase fluids to 64oz per day  Scale holiday treats on a 1-10 scale; eat high-value treats mindfully and  skip the rest    Follow Up/Monitoring:  Other  -  patient to follow up in 4-8 weeks    Time Spent With Patient:  24 Minutes    Ingrid Mendez RD, LD  Clinical Dietitian     MULUGETA CRABTREE      Physical Exam

## 2020-12-14 NOTE — PATIENT INSTRUCTIONS
Goals:    1. Continue with daily walks. Add in Oculus workouts 2x/week    2. Have a source of protein at lunch - keep simple, accessible options on hand such as hard boiled eggs, yogurt, cottage cheese, deli meat, protein shakes, etc.     3. Increase fluids to 64oz per day    4. Rate holiday treats on a 1-10 scale - enjoy controlled portions of high-value items but skip the less important options        Your next visit can be scheduled via the call center at . Let's follow up in 1-2 months. Have a great holiday!    Ingrid Mendez RD, LD  Clinical Dietitian

## 2020-12-17 ENCOUNTER — OFFICE VISIT (OUTPATIENT)
Dept: PEDIATRICS | Facility: CLINIC | Age: 50
End: 2020-12-17
Payer: COMMERCIAL

## 2020-12-17 VITALS
OXYGEN SATURATION: 100 % | SYSTOLIC BLOOD PRESSURE: 110 MMHG | BODY MASS INDEX: 28.86 KG/M2 | HEART RATE: 73 BPM | DIASTOLIC BLOOD PRESSURE: 70 MMHG | WEIGHT: 187 LBS | TEMPERATURE: 98.7 F

## 2020-12-17 DIAGNOSIS — M70.62 TROCHANTERIC BURSITIS OF BOTH HIPS: Primary | ICD-10-CM

## 2020-12-17 DIAGNOSIS — M70.61 TROCHANTERIC BURSITIS OF BOTH HIPS: Primary | ICD-10-CM

## 2020-12-17 PROCEDURE — 99213 OFFICE O/P EST LOW 20 MIN: CPT | Performed by: INTERNAL MEDICINE

## 2020-12-17 NOTE — PROGRESS NOTES
Subjective     Laya Zuleta is a 50 year old female who presents to clinic today for the following health issues:    Musculoskeletal Problem    History of Present Illness       She eats 2-3 servings of fruits and vegetables daily.She consumes 0 sweetened beverage(s) daily.She exercises with enough effort to increase her heart rate 30 to 60 minutes per day.  She exercises with enough effort to increase her heart rate 5 days per week.   She is taking medications regularly.           Musculoskeletal problem/pain  Onset/Duration: 2 years  Description  Location: right hip - left hip at times  Joint Swelling: no  Redness: no  Pain: YES- wakes her up at night  Warmth: no  Intensity:  moderate  Progression of Symptoms:  worsening  Accompanying signs and symptoms:   Fevers: no  Numbness/tingling/weakness: no  History  Trauma to the area: no  Recent illness:  no  Previous similar problem: no  Previous evaluation:  no  Precipitating or alleviating factors:  Aggravating factors include: lying down  Therapies tried and outcome: nothing    Patient Active Problem List   Diagnosis     Chronic maxillary sinusitis     Acne     Family history of breast cancer     Overweight (BMI 25.0-29.9)     Premenstrual dysphoric disorder     Past Medical History:   Diagnosis Date     Acne 11/29/2010     CARDIOVASCULAR SCREENING; LDL GOAL LESS THAN 160 11/29/2010       Past Surgical History:   Procedure Laterality Date     COLONOSCOPY N/A 11/19/2018    Procedure: Colonoscopy;  Surgeon: Stacia Cm MD;  Location:  GI     LAPAROSCOPIC APPENDECTOMY CHILD      1994     SINUS SURGERY      1/10       Family History   Problem Relation Age of Onset     Colon Cancer Father      Cancer Sister 23        cervical cancer     Cancer Sister 44        lymphoma     Family History Negative Daughter      Cancer Paternal Grandmother         lymphoma       ALLERGIES   No Known Allergies    Current Outpatient Medications   Medication Sig Dispense  Refill     buPROPion (WELLBUTRIN XL) 150 MG 24 hr tablet Take 1 tablet (150 mg) by mouth every morning 180 tablet 3     Calcium Carbonate-Vitamin D (CALCIUM 600 + D) 600-400 MG-UNIT tablet Take 1 tablet by mouth 2 times daily. 100 tablet 3     citalopram (CELEXA) 10 MG tablet Take 1 tablet (10 mg) by mouth daily 90 tablet 3     phentermine (ADIPEX-P) 37.5 MG tablet Take 1/2 tablet every morning with breakfast. Increase to 1 tablet after 1 week if needed. 90 tablet 0     SPIRONOLACTONE PO Take 75 mg by mouth          Review of Systems   Constitutional, HEENT, cardiovascular, pulmonary, gi and gu systems are negative, except as otherwise noted.      Objective    /70 (Cuff Size: Adult Regular)   Pulse 73   Temp 98.7  F (37.1  C) (Tympanic)   Wt 84.8 kg (187 lb)   SpO2 100%   BMI 28.86 kg/m    Body mass index is 28.86 kg/m .  Physical Exam   GENERAL: healthy, alert and no distress  RESP: lungs clear to auscultation - no rales, rhonchi or wheezes  CV: regular rate and rhythm, normal S1 S2, no S3 or S4, no murmur, click or rub, no peripheral edema   MS:   Hip: FROM b/l.   Tender to palpation over greater trochanter b/l    ASSESSMENT:      ICD-10-CM    1. Trochanteric bursitis of both hips  M70.61 Orthopedic & Spine  Referral    M70.62      Suspect trochanteric bursitis, also consider IT band syndrome  Continue ibuprofen prn  Referred to sports medicine, possible bursa injection    Tonio Bowles MD

## 2020-12-17 NOTE — PATIENT INSTRUCTIONS
You will be contacted to schedule with Sports Medicine  Continue ibuprofen as needed  Reduce length of walks temporarily until pain improves      Patient Education     Understanding Trochanteric Bursitis    A bursa is a thin, slippery, sac-like film. It contains a small amount of fluid. This structure is found between bones and soft tissues in and around joints. A bursa cushions and protects a joint. It keeps parts of a joint from rubbing against each other. If a bursa becomes inflamed and irritated, it's known as bursitis.   The trochanteric bursa is found on the hip joint. It lies on top of the bump at the top of the thighbone called the greater trochanter. Inflammation of this bursa is called trochanteric bursitis.   How to say it   [id number]  Causes of trochanteric bursitis  Causes may include:    Overuse of the hip during running or other sports, dance, or work    Falling on or irritation to the side of the hip  This condition may occur along with other problems, such as osteoarthritis of the hip or knee, or low back problems. In rare cases, it may occur after hip surgery.   Symptoms of trochanteric bursitis    Pain or aching on the side of the hip. It's often felt as a sharp, intense pain. The pain may travel down the leg.    Swelling, tenderness, or warmth on the side of the hip at the bony bump at the top of the thigh    Treatment for trochanteric bursitis  These may include:    Resting the hip. This allows the bursa to heal.    Prescription or over-the-counter pain medicines. These help reduce inflammation, swelling, and pain. NSAIDs (nonsteroidal anti-inflammatory drugs) are the most common medicines used. Medicines may be prescribed or bought over the counter. They may be given as pills. Or they may be put on the skin as a gel, cream, or patch.    Cold packs and heat packs. These help reduce pain and swelling.    Stretching and strengthening exercises. These improve flexibility and strength around  the hip.    Physical therapy. This includes exercises or other treatments.    Injections of medicine into the bursa.  This may help reduce inflammation and relieve symptoms. The medicine is usually a corticosteroid. This is a strong anti-inflammatory medicine.  Possible complications  If you don t give your hip time to heal, the problem may not go away, may return, or may get worse. Rest and treat your hip as directed.   When to call your healthcare provider  Call your healthcare provider right away if you have any of these:    Fever of 100.4 F (38 C) or higher, or as directed by your provider    Redness, swelling, or warmth that gets worse    Symptoms that don t get better with prescribed medicines, or get worse    New symptoms  Constantin last reviewed this educational content on 6/1/2019 2000-2020 [employer]. [address] [place] [address], [place] [address]. All rights reserved. This information is not intended as a substitute for professional medical care. Always follow your healthcare professional's instructions.

## 2020-12-19 ENCOUNTER — ANCILLARY PROCEDURE (OUTPATIENT)
Dept: GENERAL RADIOLOGY | Facility: CLINIC | Age: 50
End: 2020-12-19
Attending: FAMILY MEDICINE
Payer: COMMERCIAL

## 2020-12-19 ENCOUNTER — OFFICE VISIT (OUTPATIENT)
Dept: ORTHOPEDICS | Facility: CLINIC | Age: 50
End: 2020-12-19
Attending: INTERNAL MEDICINE
Payer: COMMERCIAL

## 2020-12-19 VITALS
DIASTOLIC BLOOD PRESSURE: 80 MMHG | HEIGHT: 68 IN | BODY MASS INDEX: 28.34 KG/M2 | WEIGHT: 187 LBS | SYSTOLIC BLOOD PRESSURE: 109 MMHG

## 2020-12-19 DIAGNOSIS — M70.61 TROCHANTERIC BURSITIS OF BOTH HIPS: ICD-10-CM

## 2020-12-19 DIAGNOSIS — M70.62 TROCHANTERIC BURSITIS OF BOTH HIPS: ICD-10-CM

## 2020-12-19 PROCEDURE — 99204 OFFICE O/P NEW MOD 45 MIN: CPT | Mod: 25 | Performed by: FAMILY MEDICINE

## 2020-12-19 PROCEDURE — 73523 X-RAY EXAM HIPS BI 5/> VIEWS: CPT | Performed by: RADIOLOGY

## 2020-12-19 PROCEDURE — 20610 DRAIN/INJ JOINT/BURSA W/O US: CPT | Mod: 50 | Performed by: FAMILY MEDICINE

## 2020-12-19 RX ORDER — METHYLPREDNISOLONE ACETATE 40 MG/ML
40 INJECTION, SUSPENSION INTRA-ARTICULAR; INTRALESIONAL; INTRAMUSCULAR; SOFT TISSUE
Status: DISCONTINUED | OUTPATIENT
Start: 2020-12-19 | End: 2021-04-05

## 2020-12-19 RX ORDER — ROPIVACAINE HYDROCHLORIDE 5 MG/ML
3 INJECTION, SOLUTION EPIDURAL; INFILTRATION; PERINEURAL
Status: DISCONTINUED | OUTPATIENT
Start: 2020-12-19 | End: 2021-04-05

## 2020-12-19 RX ADMIN — ROPIVACAINE HYDROCHLORIDE 3 ML: 5 INJECTION, SOLUTION EPIDURAL; INFILTRATION; PERINEURAL at 11:29

## 2020-12-19 RX ADMIN — METHYLPREDNISOLONE ACETATE 40 MG: 40 INJECTION, SUSPENSION INTRA-ARTICULAR; INTRALESIONAL; INTRAMUSCULAR; SOFT TISSUE at 11:29

## 2020-12-19 ASSESSMENT — MIFFLIN-ST. JEOR: SCORE: 1503.79

## 2020-12-19 NOTE — LETTER
12/19/2020         RE: Laya Zuleta  [address] [place] [address]        Dear Colleague,    Thank you for referring your patient, Laya Zuleta, to the [facility] [place]. Please see a copy of my visit note below.    [facility]   Clinic Visit s Dec 19, 2020    PCP: Suzanna Smith    ASSESSMENT/PLAN    ICD-10-CM    1. Trochanteric bursitis of both hips  M70.61 Orthopedic & Spine  Referral    M70.62 XR Pelvis and Hip Bilateral 2 Views     Large Joint Injection/Arthocentesis: bilateral greater trochanteric bursa     Discussed nature of syndrome as being related to friction of IT band across greater trochanter, and likelihood of muscular imbalance of hip flexors being greater than hip extensors as being the cause for the imbalance.  Offered cortisone injection for pain relief, to be followed by hip stabilization exercises for long term relief.  She declines therapy referral today, instead will work on hip abduction and external rotator strength herself.  Agrees to bilateral trochanteric bursa cortisone injections.      Today's Visit:  Laya is a 50 year old female who is seen in consultation at the request of Dr. Bowles for   Chief Complaint   Patient presents with     Left Hip - Pain     Right Hip - Pain     Injury: Reports insidious onset without acute precipitating event.     Location of Pain: bilateral hip, distal lateral hip/distal buttocks bilaterally, left worse than right.  Duration of Pain: acute on chronic, 1-2 year(s), worse in the past 2 months  Rating of Pain at worst: 7/10  Rating of Pain Currently: 2/10  Pain is better with: ibuprofen   Pain is worse with: sleeping on her side (direct pressure causes pain in that hip)  Treatment so far consists of: no treatment tried to date  Associated symptoms: no distal numbness or tingling; denies swelling or warmth  Recent imaging completed: X-rays completed  12/19/2020.  Prior History of related problems: denies    Social History: is employed as a .       Past Medical History:   Diagnosis Date     Acne 11/29/2010     CARDIOVASCULAR SCREENING; LDL GOAL LESS THAN 160 11/29/2010       Patient Active Problem List    Diagnosis Date Noted     Overweight (BMI 25.0-29.9) 10/05/2020     Priority: Medium     Premenstrual dysphoric disorder 10/05/2020     Priority: Medium     Family history of breast cancer 04/03/2015     Priority: Medium     Chronic maxillary sinusitis 11/29/2010     Priority: Medium     Acne 11/29/2010     Priority: Medium       Family History   Problem Relation Age of Onset     Colon Cancer Father      Cancer Sister 23        cervical cancer     Cancer Sister 44        lymphoma     Family History Negative Daughter      Cancer Paternal Grandmother         lymphoma       Social History     Socioeconomic History     Marital status:      Spouse name: Gallito     Number of children: 1     Years of education: Not on file     Highest education level: Not on file   Occupational History     Not on file   Social Needs     Financial resource strain: Not on file     Food insecurity     Worry: Not on file     Inability: Not on file     Transportation needs     Medical: Not on file     Non-medical: Not on file   Tobacco Use     Smoking status: Never Smoker     Smokeless tobacco: Never Used   Substance and Sexual Activity     Alcohol use: Yes     Comment: 2 times per week, 2 per time     Drug use: No     Sexual activity: Yes     Partners: Male     Birth control/protection: Surgical     Comment: vasectomy   Lifestyle     Physical activity     Days per week: Not on file     Minutes per session: Not on file     Stress: Not on file   Relationships     Social connections     Talks on phone: Not on file     Gets together: Not on file     Attends Cheondoism service: Not on file     Active member of club or organization: Not on file     Attends meetings of clubs  "or organizations: Not on file     Relationship status: Not on file     Intimate partner violence     Fear of current or ex partner: Not on file     Emotionally abused: Not on file     Physically abused: Not on file     Forced sexual activity: Not on file   Other Topics Concern     Parent/sibling w/ CABG, MI or angioplasty before 65F 55M? Not Asked   Social History Narrative     Not on file       Past Surgical History:   Procedure Laterality Date     COLONOSCOPY N/A 11/19/2018    Procedure: Colonoscopy;  Surgeon: Stacia Cm MD;  Location:  GI     LAPAROSCOPIC APPENDECTOMY CHILD      1994     SINUS SURGERY      1/10             Review of Systems   Musculoskeletal: Positive for joint pain.   All other systems reviewed and are negative.        Physical Exam  /80   Ht 1.715 m (5' 7.5\")   Wt 84.8 kg (187 lb)   BMI 28.86 kg/m    Constitutional:well-developed, well-nourished, and in no distress.   Cardiovascular: Intact distal pulses.    Neurological: alert. Gait Normal:   Gait, station, stance, and balance appear normal for age  Skin: Skin is warm and dry.   Psychiatric: Mood and affect normal.   Respiratory: unlabored, speaks in full sentences  Lymph: no LAD, no lymphangitis            Right Hip Exam     Tenderness   The patient is experiencing tenderness in the greater trochanter.    Range of Motion   Abduction: normal   Adduction: normal   Flexion: normal   External rotation: normal   Internal rotation: normal     Muscle Strength   Abduction: 4/5   Adduction: 5/5   Flexion: 5/5     Tests   REHAN: positive    Other   Erythema: absent  Scars: absent  Sensation: normal  Pulse: present      Left Hip Exam     Tenderness   The patient is experiencing tenderness in the greater trochanter.    Range of Motion   Abduction: normal   Adduction: normal   Extension: normal   Flexion: normal   External rotation: normal   Internal rotation: normal     Muscle Strength   Abduction: 4/5   Adduction: 5/5   Flexion: " 5/5     Tests   REHNA: positive    Other   Erythema: absent  Scars: absent  Sensation: normal  Pulse: present                  X-ray images Ordered and independently reviewed by me in the office today with the patient. X-ray shows: normal hips              Large Joint Injection/Arthocentesis: bilateral greater trochanteric bursa    Date/Time: 12/19/2020 11:29 AM  Performed by: Edward Fuchs MD  Authorized by: Edward Fuchs MD     Indications:  Pain  Needle Size:  25 G  Guidance: landmark guided    Location:  Hip  Laterality:  Bilateral      Site:  Bilateral greater trochanteric bursa  Medications (Right):  40 mg methylPREDNISolone 40 MG/ML; 3 mL ropivacaine 5 MG/ML   Medications (Right) comment:  9 mL of Ropivacaine was administered   Medications (Left):  40 mg methylPREDNISolone 40 MG/ML; 3 mL ropivacaine 5 MG/ML   Medications (Left) comment:  9 mL of Ropivacaine was administered   Outcome:  Tolerated well, no immediate complications  Procedure discussed: discussed risks, benefits, and alternatives    Consent Given by:  Patient  Timeout: timeout called immediately prior to procedure    Prep: patient was prepped and draped in usual sterile fashion              Again, thank you for allowing me to participate in the care of your patient.        Sincerely,        Edward Fuchs MD

## 2020-12-19 NOTE — PROGRESS NOTES
[facility]   Clinic Visit s Dec 19, 2020    PCP: Suzanna Smith    ASSESSMENT/PLAN    ICD-10-CM    1. Trochanteric bursitis of both hips  M70.61 Orthopedic & Spine  Referral    M70.62 XR Pelvis and Hip Bilateral 2 Views     Large Joint Injection/Arthocentesis: bilateral greater trochanteric bursa     Discussed nature of syndrome as being related to friction of IT band across greater trochanter, and likelihood of muscular imbalance of hip flexors being greater than hip extensors as being the cause for the imbalance.  Offered cortisone injection for pain relief, to be followed by hip stabilization exercises for long term relief.  She declines therapy referral today, instead will work on hip abduction and external rotator strength herself.  Agrees to bilateral trochanteric bursa cortisone injections.      Today's Visit:  Laya is a 50 year old female who is seen in consultation at the request of Dr. Bowles for   Chief Complaint   Patient presents with     Left Hip - Pain     Right Hip - Pain     Injury: Reports insidious onset without acute precipitating event.     Location of Pain: bilateral hip, distal lateral hip/distal buttocks bilaterally, left worse than right.  Duration of Pain: acute on chronic, 1-2 year(s), worse in the past 2 months  Rating of Pain at worst: 7/10  Rating of Pain Currently: 2/10  Pain is better with: ibuprofen   Pain is worse with: sleeping on her side (direct pressure causes pain in that hip)  Treatment so far consists of: no treatment tried to date  Associated symptoms: no distal numbness or tingling; denies swelling or warmth  Recent imaging completed: X-rays completed 12/19/2020.  Prior History of related problems: denies    Social History: is employed as a .       Past Medical History:   Diagnosis Date     Acne 11/29/2010     CARDIOVASCULAR SCREENING; LDL GOAL LESS THAN 160 11/29/2010       Patient Active Problem List     Diagnosis Date Noted     Overweight (BMI 25.0-29.9) 10/05/2020     Priority: Medium     Premenstrual dysphoric disorder 10/05/2020     Priority: Medium     Family history of breast cancer 04/03/2015     Priority: Medium     Chronic maxillary sinusitis 11/29/2010     Priority: Medium     Acne 11/29/2010     Priority: Medium       Family History   Problem Relation Age of Onset     Colon Cancer Father      Cancer Sister 23        cervical cancer     Cancer Sister 44        lymphoma     Family History Negative Daughter      Cancer Paternal Grandmother         lymphoma       Social History     Socioeconomic History     Marital status:      Spouse name: Gallito     Number of children: 1     Years of education: Not on file     Highest education level: Not on file   Occupational History     Not on file   Social Needs     Financial resource strain: Not on file     Food insecurity     Worry: Not on file     Inability: Not on file     Transportation needs     Medical: Not on file     Non-medical: Not on file   Tobacco Use     Smoking status: Never Smoker     Smokeless tobacco: Never Used   Substance and Sexual Activity     Alcohol use: Yes     Comment: 2 times per week, 2 per time     Drug use: No     Sexual activity: Yes     Partners: Male     Birth control/protection: Surgical     Comment: vasectomy   Lifestyle     Physical activity     Days per week: Not on file     Minutes per session: Not on file     Stress: Not on file   Relationships     Social connections     Talks on phone: Not on file     Gets together: Not on file     Attends Synagogue service: Not on file     Active member of club or organization: Not on file     Attends meetings of clubs or organizations: Not on file     Relationship status: Not on file     Intimate partner violence     Fear of current or ex partner: Not on file     Emotionally abused: Not on file     Physically abused: Not on file     Forced sexual activity: Not on file   Other Topics  "Concern     Parent/sibling w/ CABG, MI or angioplasty before 65F 55M? Not Asked   Social History Narrative     Not on file       Past Surgical History:   Procedure Laterality Date     COLONOSCOPY N/A 11/19/2018    Procedure: Colonoscopy;  Surgeon: Stacia Cm MD;  Location: [facility]     LAPAROSCOPIC APPENDECTOMY CHILD      1994     SINUS SURGERY      1/10             Review of Systems   Musculoskeletal: Positive for joint pain.   All other systems reviewed and are negative.        Physical Exam  /80   Ht 1.715 m (5' 7.5\")   Wt 84.8 kg (187 lb)   BMI 28.86 kg/m    Constitutional:well-developed, well-nourished, and in no distress.   Cardiovascular: Intact distal pulses.    Neurological: alert. Gait Normal:   Gait, station, stance, and balance appear normal for age  Skin: Skin is warm and dry.   Psychiatric: Mood and affect normal.   Respiratory: unlabored, speaks in full sentences  Lymph: no LAD, no lymphangitis            Right Hip Exam     Tenderness   The patient is experiencing tenderness in the greater trochanter.    Range of Motion   Abduction: normal   Adduction: normal   Flexion: normal   External rotation: normal   Internal rotation: normal     Muscle Strength   Abduction: 4/5   Adduction: 5/5   Flexion: 5/5     Tests   REHAN: positive    Other   Erythema: absent  Scars: absent  Sensation: normal  Pulse: present      Left Hip Exam     Tenderness   The patient is experiencing tenderness in the greater trochanter.    Range of Motion   Abduction: normal   Adduction: normal   Extension: normal   Flexion: normal   External rotation: normal   Internal rotation: normal     Muscle Strength   Abduction: 4/5   Adduction: 5/5   Flexion: 5/5     Tests   REHAN: positive    Other   Erythema: absent  Scars: absent  Sensation: normal  Pulse: present                  X-ray images Ordered and independently reviewed by me in the office today with the patient. X-ray shows: normal hips              Large Joint " Injection/Arthocentesis: bilateral greater trochanteric bursa    Date/Time: 12/19/2020 11:29 AM  Performed by: Edward Fuchs MD  Authorized by: Edward Fuchs MD     Indications:  Pain  Needle Size:  25 G  Guidance: landmark guided    Location:  Hip  Laterality:  Bilateral      Site:  Bilateral greater trochanteric bursa  Medications (Right):  40 mg methylPREDNISolone 40 MG/ML; 3 mL ropivacaine 5 MG/ML   Medications (Right) comment:  9 mL of Ropivacaine was administered   Medications (Left):  40 mg methylPREDNISolone 40 MG/ML; 3 mL ropivacaine 5 MG/ML   Medications (Left) comment:  9 mL of Ropivacaine was administered   Outcome:  Tolerated well, no immediate complications  Procedure discussed: discussed risks, benefits, and alternatives    Consent Given by:  Patient  Timeout: timeout called immediately prior to procedure    Prep: patient was prepped and draped in usual sterile fashion

## 2021-01-04 ENCOUNTER — VIRTUAL VISIT (OUTPATIENT)
Dept: SURGERY | Facility: CLINIC | Age: 51
End: 2021-01-04
Payer: COMMERCIAL

## 2021-01-04 VITALS — BODY MASS INDEX: 27.89 KG/M2 | WEIGHT: 184 LBS | HEIGHT: 68 IN

## 2021-01-04 DIAGNOSIS — E66.3 OVERWEIGHT (BMI 25.0-29.9): Primary | ICD-10-CM

## 2021-01-04 DIAGNOSIS — F32.81 PREMENSTRUAL DYSPHORIC DISORDER: ICD-10-CM

## 2021-01-04 PROCEDURE — 99214 OFFICE O/P EST MOD 30 MIN: CPT | Mod: 95 | Performed by: FAMILY MEDICINE

## 2021-01-04 RX ORDER — PHENTERMINE HYDROCHLORIDE 37.5 MG/1
TABLET ORAL
Qty: 90 TABLET | Refills: 0 | Status: SHIPPED | OUTPATIENT
Start: 2021-01-04 | End: 2022-04-12

## 2021-01-04 ASSESSMENT — MIFFLIN-ST. JEOR: SCORE: 1490.18

## 2021-01-04 NOTE — PROGRESS NOTES
Laya Zuleta is a 50 year old female who is being evaluated via a billable video visit.      If the video visit is dropped, the invitation should be resent by: Text to cell phone: [phone]  Will anyone else be joining your video visit? No    Video Start Time: 1:03     Video-Visit Details    Type of service:  Video Visit    Video End Time:1:18    Originating Location (pt. Location): Home    Distant Location (provider location):  [facility]     Platform used for Video Visit: Suha

## 2021-01-04 NOTE — PATIENT INSTRUCTIONS

## 2021-01-12 ENCOUNTER — TELEPHONE (OUTPATIENT)
Dept: SURGERY | Facility: CLINIC | Age: 51
End: 2021-01-12

## 2021-01-12 NOTE — TELEPHONE ENCOUNTER
Called rx in to pharmacy as pt was seen virtually.  TORB given to pharmacist Noah.    Angela Awad, RN on 1/12/2021 at 12:08 PM

## 2021-01-15 ENCOUNTER — VIRTUAL VISIT (OUTPATIENT)
Dept: PSYCHOLOGY | Facility: CLINIC | Age: 51
End: 2021-01-15
Payer: COMMERCIAL

## 2021-01-15 ENCOUNTER — HEALTH MAINTENANCE LETTER (OUTPATIENT)
Age: 51
End: 2021-01-15

## 2021-01-15 DIAGNOSIS — F43.22 ADJUSTMENT DISORDER WITH ANXIETY: Primary | ICD-10-CM

## 2021-01-15 PROCEDURE — 90834 PSYTX W PT 45 MINUTES: CPT | Mod: GT | Performed by: MARRIAGE & FAMILY THERAPIST

## 2021-01-15 ASSESSMENT — ANXIETY QUESTIONNAIRES
5. BEING SO RESTLESS THAT IT IS HARD TO SIT STILL: NOT AT ALL
GAD7 TOTAL SCORE: 0
IF YOU CHECKED OFF ANY PROBLEMS ON THIS QUESTIONNAIRE, HOW DIFFICULT HAVE THESE PROBLEMS MADE IT FOR YOU TO DO YOUR WORK, TAKE CARE OF THINGS AT HOME, OR GET ALONG WITH OTHER PEOPLE: NOT DIFFICULT AT ALL
7. FEELING AFRAID AS IF SOMETHING AWFUL MIGHT HAPPEN: NOT AT ALL
3. WORRYING TOO MUCH ABOUT DIFFERENT THINGS: NOT AT ALL
1. FEELING NERVOUS, ANXIOUS, OR ON EDGE: NOT AT ALL
2. NOT BEING ABLE TO STOP OR CONTROL WORRYING: NOT AT ALL
6. BECOMING EASILY ANNOYED OR IRRITABLE: NOT AT ALL

## 2021-01-15 ASSESSMENT — PATIENT HEALTH QUESTIONNAIRE - PHQ9
5. POOR APPETITE OR OVEREATING: NOT AT ALL
SUM OF ALL RESPONSES TO PHQ QUESTIONS 1-9: 5

## 2021-01-15 NOTE — PROGRESS NOTES
Progress Note    Patient Name: Laya Zuleta  Date: January 15, 2021            Service Type: Individual  Video Visit: Yes     Telemedicine Visit: The patient's condition can be safely assessed and treated via synchronous audio and visual telemedicine encounter.    Reason for Telemedicine Visit: Services only offered telehealth and due to COVID-19 restrictions  Originating Site (Patient Location): Patient's home  Distant Site (Provider Location): Provider Remote Setting  Consent:  The patient/guardian has verbally consented to: the potential risks and benefits of telemedicine (video visit) versus in person care; bill my insurance or make self-payment for services provided; and responsibility for payment of non-covered services.   Mode of Communication:  Video Conference via Doxy  As the provider I attest to compliance with applicable laws and regulations related to telemedicine.      Session Start Time: 9:30  Session End Time: 10:15     Session Length: 45 min    Session #: 15    Attendees: Client attended alone     Treatment Plan Last Reviewed: January 15, 2021    DATA  Interactive Complexity: No  Crisis: No       Progress Since Last Session (Related to Symptoms / Goals / Homework):   Symptoms: Worsening during pandemic.    Homework: Achieved / completed to satisfaction with strong self-care performance.      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     Intervention:   Reviewed self-care goals,  performance and strategies. Client reports she is frustrated by the pandemic conditions of remote work and little social connection. She says her hips hurt and she is having difficulty sleeping. Sleep hygiene reviewed. She says she will continue a medication she stopped which seems to have made her mood worse. Processed emotions in session, validated, supportive counseling.    ASSESSMENT: Current Emotional / Mental Status (status of significant symptoms):   Risk status (Self / Other harm or suicidal ideation)   Patient denies current fears or concerns for personal safety.   Patient denies current or recent suicidal ideation or behaviors.   Patientdenies current or recent homicidal ideation or behaviors.   Patient denies current or recent self injurious behavior or ideation.   Patient denies other safety concerns.   Patient reports there has been no change in risk factors since their last session.     Patientreports there has been no change in protective factors since their last session.     Recommended that patient call 911 or go to the local ED should there be a change in any of these risk factors.     Appearance:   Appropriate    Eye Contact:   Good    Psychomotor Behavior: Normal    Attitude:   Cooperative    Orientation:   All   Speech    Rate / Production: Normal     Volume:  Normal    Mood:    Angry  Anxious  Sad    Affect:    Appropriate    Thought Content:  Clear    Thought Form:  Coherent  Logical    Insight:    Good      Medication Review:   No changes to current psychiatric medication(s)     Medication Compliance:   Yes     Changes in Health Issues:   None reported     Chemical Use Review:   Substance Use: Chemical use reviewed, no active concerns identified      Tobacco Use: No current tobacco use.      Diagnosis:  Adjustment disorder with anxiety    Collateral Reports Completed:   Not Applicable    PLAN: (Patient Tasks / Therapist Tasks / Other)  Client will focus on sleep hygiene, nutrition and general  self-care to improve mood.        Chung Martinez MA, LMFT                                                         ______________________________________________________________________                                                 Treatment Plan    Client's Name: Laya Zuleta  YOB: 1970    Date: January 15, 2021    DSM-V Diagnoses: 309.24 - Adjustment Disorder with Anxiety  ; V71.09 - No Diagnosis  Psychosocial / Contextual Factors: daughter with bone disease  WHODAS: 13    Referral / Collaboration:  Referral to another professional/service is not indicated at this time..    Anticipated number of session or this episode of care: ongoing    Measurable Treatment Goal(s) related to diagnosis / functional impairment(s)   I will know I've met my goal when I learn tools to cope with and lower my anxiety.     Goal 1: Client will experience a significant reduction in ZAIRA-7 scores.     Objective #A   Client will learn and integrate DBT/CBT strategies to more effectively manage emotions and relationships.   Status: Continued: January 15, 2021     Intervention(s)   Therapist will teach emotional regulation, distress tolerance, interpersonal effectiveness, and mindfulness skills. Meditation resources will be offered. Therapist will teach TIPP skills: Temperature, Intense exercise, Paced breathing, and Paired Muscle Relaxation.    Objective #B   Client will learn to identify negative thoughts that are present and use cbt strategies to diffuse anxiety.  Status: Continued: January 15, 2021     Intervention(s)   Therapist will teach cognitive distortion identification and coping strategies.    Objective #C   Client will engage in positive self-care.  Status: Continued: January 15, 2021     Intervention(s)   Therapist will teach self-care goals:  Maintain balance in schedule (time for self/others, relaxation/activities, leisure/tasks, home/out of the house), assert needs and set limits with others,  challenge negative thoughts/use affirming and encouraging self-talk, engage with support people on regular basis, practice behavior activation behaviors (sleep hygiene, balanced eating, physical activity, medical needs, personal hygiene), acknowledge and accept your feelings.      Patient has reviewed and agreed to the above plan.    Rich Martinez MA, LMFT  January 15, 2021

## 2021-01-16 ASSESSMENT — ANXIETY QUESTIONNAIRES: GAD7 TOTAL SCORE: 0

## 2021-01-22 ENCOUNTER — MYC MEDICAL ADVICE (OUTPATIENT)
Dept: PEDIATRICS | Facility: CLINIC | Age: 51
End: 2021-01-22

## 2021-01-22 DIAGNOSIS — M25.559 HIP PAIN: Primary | ICD-10-CM

## 2021-01-22 NOTE — TELEPHONE ENCOUNTER
Routing to Dr. Bowles to review.     Wilda Alvares, RN   [facility] -- Triage Nurse

## 2021-01-26 ENCOUNTER — THERAPY VISIT (OUTPATIENT)
Dept: PHYSICAL THERAPY | Facility: CLINIC | Age: 51
End: 2021-01-26
Attending: INTERNAL MEDICINE
Payer: COMMERCIAL

## 2021-01-26 DIAGNOSIS — M25.559 HIP PAIN: ICD-10-CM

## 2021-01-26 PROCEDURE — 97110 THERAPEUTIC EXERCISES: CPT | Mod: GP | Performed by: PHYSICAL THERAPIST

## 2021-01-26 PROCEDURE — 97161 PT EVAL LOW COMPLEX 20 MIN: CPT | Mod: GP | Performed by: PHYSICAL THERAPIST

## 2021-01-26 ASSESSMENT — ACTIVITIES OF DAILY LIVING (ADL)
DEEP_SQUATTING: NO DIFFICULTY AT ALL
HOS_ADL_HIGHEST_POTENTIAL_SCORE: 68
GETTING_INTO_AND_OUT_OF_A_BATHTUB: NO DIFFICULTY AT ALL
STEPPING_UP_AND_DOWN_CURBS: NO DIFFICULTY AT ALL
WALKING_INITIALLY: NO DIFFICULTY AT ALL
LIGHT_TO_MODERATE_WORK: NO DIFFICULTY AT ALL
WALKING_15_MINUTES_OR_GREATER: NO DIFFICULTY AT ALL
STANDING_FOR_15_MINUTES: NO DIFFICULTY AT ALL
SITTING_FOR_15_MINUTES: SLIGHT DIFFICULTY
WALKING_UP_STEEP_HILLS: NO DIFFICULTY AT ALL
GOING_UP_1_FLIGHT_OF_STAIRS: NO DIFFICULTY AT ALL
HOS_ADL_ITEM_SCORE_TOTAL: 65
HEAVY_WORK: NO DIFFICULTY AT ALL
GOING_DOWN_1_FLIGHT_OF_STAIRS: NO DIFFICULTY AT ALL
HOS_ADL_COUNT: 17
HOS_ADL_SCORE(%): 95.59
ROLLING_OVER_IN_BED: EXTREME DIFFICULTY
HOW_WOULD_YOU_RATE_YOUR_CURRENT_LEVEL_OF_FUNCTION_DURING_YOUR_USUAL_ACTIVITIES_OF_DAILY_LIVING_FROM_0_TO_100_WITH_100_BEING_YOUR_LEVEL_OF_FUNCTION_PRIOR_TO_YOUR_HIP_PROBLEM_AND_0_BEING_THE_INABILITY_TO_PERFORM_ANY_OF_YOUR_USUAL_DAILY_ACTIVITIES?: 95
TWISTING/PIVOTING_ON_INVOLVED_LEG: NO DIFFICULTY AT ALL
RECREATIONAL_ACTIVITIES: NO DIFFICULTY AT ALL
WALKING_APPROXIMATELY_10_MINUTES: NO DIFFICULTY AT ALL
GETTING_INTO_AND_OUT_OF_AN_AVERAGE_CAR: NO DIFFICULTY AT ALL
PUTTING_ON_SOCKS_AND_SHOES: NO DIFFICULTY AT ALL
WALKING_DOWN_STEEP_HILLS: NO DIFFICULTY AT ALL

## 2021-01-26 NOTE — PROGRESS NOTES
[facility] Initial Evaluation  Subjective:  The history is provided by the patient. No  was used.   Patient Health History  Laya Zuleta being seen for hip bursitis.     Problem began: 10/10/2020.   Problem occurred: repetitive walking   Pain is reported as 5/10 on pain scale.  General health as reported by patient is fair.  Pertinent medical history includes: none.     Medical allergies: none.   Surgeries include:  Other. Other surgery history details: appendix, sinus.    Current medications:  Anti-depressants.    Current occupation is .   Primary job tasks include:  Computer work.                  Therapist Generated HPI Evaluation  Problem details: Pt reports B hip pain which started in October 2020 with walking. Symptoms started in left lateral hip and then shortly later in right hip. She relates this to walking. She has increased pain upon waking in the morning and notes sharp and aching pain bilateral hips and lateral thigh. She had B cortisone injections in Dec 2020. She decreased walking from 45 minutes to 20-30 minutes and didn't notice any change.She feels fine walking but then has pain afterwards.  She didn't notice any change with cortisone injections but now feeling slightly better. She has increased pain lying on left side and initially on right.  She has pain from sit to stand after prolonged sitting. .         Type of problem:  Bilateral hips.      Condition occurred with:  Insidious onset (possibly from increased walking frequency and duration).    Patient reports pain:  Greater trochanter and lateral.  Pain is described as aching and sharp   Pain radiates to:  Thigh. Pain is worse in the P.M..    Associated symptoms:  Loss of motion/stiffness. Symptoms are exacerbated by bending/squatting and lying on extremity  and relieved by rest.                              Objective:  System         Lumbar/SI Evaluation  ROM:    AROM Lumbar:    Flexion:          To mid lower leg  Ext:                    75%   Side Bend:        Left:     Right:   Rotation:           Left:     Right:   Side Glide:        Left:  Wnl, pain left hip    Right:  Wnl        Strength: fair lower abd strength                                                      Hip Evaluation  HIP AROM:    Flexion: Left: 110    Right:  115    Extension: Left: 5    Right:  7                Hip Strength:    Flexion:   Left: 5-/5   Pain:  Right: 5/5   Pain:                    Extension:  Left: 4-/5  Pain:Right: 4-/5    Pain:    Abduction:  Left: 3+/5    +   Pain:Right: 3+/5   +   Pain:  Adduction:  Left: 5/5   -   Pain:Right: 5/5   -  Pain:      Knee Flexion:  Left: 5/5   -  Pain:Right: 5/5   -  Pain:  Knee Extension:  Left: 5/5   -  Pain:Right: 5/5    -  Pain:        Hip Special Testing:    Left hip positive for the following special tests:  Tana   Right hip positive for the following special tests:  Tana    Hip Palpation:    Left hip tenderness present at:   Greater Trachanter; Piriformis and Bursa  Right hip tenderness present at:  Greater Trachanter; Piriformis and Bursa  Functional Testing:          Quad:    Single leg squat:   Left:    Significant loss of control  Right:   Significant loss of control    Bilateral leg squat:  Moderate loss of control                  General     ROS    Assessment/Plan:    Patient is a 50 year old female with both sides hip complaints.    Patient has the following significant findings with corresponding treatment plan.                Diagnosis 1:  B hip pain  Pain -  hot/cold therapy, manual therapy, self management and education  Decreased ROM/flexibility - manual therapy, therapeutic exercise and home program  Decreased strength - therapeutic exercise, therapeutic activities and home program  Decreased function - therapeutic activities and home program    Therapy Evaluation Codes:     Cumulative Therapy Evaluation is: Low complexity.    Previous and current  functional limitations:  (See Goal Flow Sheet for this information)    Short term and Long term goals: (See Goal Flow Sheet for this information)     Communication ability:  Patient appears to be able to clearly communicate and understand verbal and written communication and follow directions correctly.  Treatment Explanation - The following has been discussed with the patient:   RX ordered/plan of care  Anticipated outcomes  Possible risks and side effects  This patient would benefit from PT intervention to resume normal activities.   Rehab potential is excellent.    Frequency:  1 X week, once daily  Duration:  for 4 weeks tapering to 2 X a month over 8 weeks  Discharge Plan:  Achieve all LTG.  Independent in home treatment program.  Reach maximal therapeutic benefit.    Please refer to the daily flowsheet for treatment today, total treatment time and time spent performing 1:1 timed codes.

## 2021-01-29 ENCOUNTER — VIRTUAL VISIT (OUTPATIENT)
Dept: PSYCHOLOGY | Facility: CLINIC | Age: 51
End: 2021-01-29
Payer: COMMERCIAL

## 2021-01-29 DIAGNOSIS — F43.22 ADJUSTMENT DISORDER WITH ANXIETY: Primary | ICD-10-CM

## 2021-01-29 PROCEDURE — 90834 PSYTX W PT 45 MINUTES: CPT | Mod: GT | Performed by: MARRIAGE & FAMILY THERAPIST

## 2021-01-29 NOTE — PROGRESS NOTES
Progress Note    Patient Name: Laya Zuleta  Date: January 29, 2021            Service Type: Individual  Video Visit: Yes     Telemedicine Visit: The patient's condition can be safely assessed and treated via synchronous audio and visual telemedicine encounter.    Reason for Telemedicine Visit: Services only offered telehealth and due to COVID-19 restrictions  Originating Site (Patient Location): Patient's home  Distant Site (Provider Location): Provider Remote Setting  Consent:  The patient/guardian has verbally consented to: the potential risks and benefits of telemedicine (video visit) versus in person care; bill my insurance or make self-payment for services provided; and responsibility for payment of non-covered services.   Mode of Communication:  Video Conference via Doxy  As the provider I attest to compliance with applicable laws and regulations related to telemedicine.      Session Start Time: 9:30  Session End Time: 10:15     Session Length: 45 min    Session #: 16    Attendees: Client attended alone     Treatment Plan Last Reviewed: January 15, 2021    DATA  Interactive Complexity: No  Crisis: No       Progress Since Last Session (Related to Symptoms / Goals / Homework):   Symptoms: Worsening during pandemic.    Homework: Achieved / completed to satisfaction with strong self-care performance.      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     Intervention:   Reviewed self-care goals,  performance and strategies. Client reports she is frustrated by the pandemic conditions of remote work and little social connection. She says a return to psychiatric medication, treatment for her hips and a new mattress have helped, but she still feels sad over her losses of socializing, physical health and the cold/dark of winter. Discussed grief and coping. Processed emotions in session, validated, supportive counseling.    ASSESSMENT: Current Emotional / Mental Status (status of significant symptoms):   Risk status (Self / Other harm or suicidal ideation)   Patient denies current fears or concerns for personal safety.   Patient denies current or recent suicidal ideation or behaviors.   Patientdenies current or recent homicidal ideation or behaviors.   Patient denies current or recent self injurious behavior or ideation.   Patient denies other safety concerns.   Patient reports there has been no change in risk factors since their last session.     Patientreports there has been no change in protective factors since their last session.     Recommended that patient call 911 or go to the local ED should there be a change in any of these risk factors.     Appearance:   Appropriate    Eye Contact:   Good    Psychomotor Behavior: Normal    Attitude:   Cooperative    Orientation:   All   Speech    Rate / Production: Normal     Volume:  Normal    Mood:    Grieving   Affect:    Appropriate    Thought Content:  Clear    Thought Form:  Coherent  Logical    Insight:    Good      Medication Review:   No changes to current psychiatric medication(s)     Medication Compliance:   Yes     Changes in Health Issues:   None reported     Chemical Use Review:   Substance Use: Chemical use reviewed, no active concerns identified      Tobacco Use: No current tobacco use.      Diagnosis:  Adjustment disorder with anxiety    Collateral Reports Completed:   Not Applicable    PLAN: (Patient Tasks / Therapist Tasks / Other)  Client will focus  on sleep hygiene, nutrition and general self-care to improve mood. She will use grief coping skills to manage her reaction to losses.        Chung Martinez MA, LMFT                                                         ______________________________________________________________________                                                 Treatment Plan    Client's Name: Laya Zuleta  YOB: 1970    Date: January 15, 2021    DSM-V Diagnoses: 309.24 - Adjustment Disorder with Anxiety  ; V71.09 - No Diagnosis  Psychosocial / Contextual Factors: daughter with bone disease  WHODAS: 13    Referral / Collaboration:  Referral to another professional/service is not indicated at this time..    Anticipated number of session or this episode of care: ongoing    Measurable Treatment Goal(s) related to diagnosis / functional impairment(s)   I will know I've met my goal when I learn tools to cope with and lower my anxiety.     Goal 1: Client will experience a significant reduction in ZAIRA-7 scores.     Objective #A   Client will learn and integrate DBT/CBT strategies to more effectively manage emotions and relationships.   Status: Continued: January 15, 2021     Intervention(s)   Therapist will teach emotional regulation, distress tolerance, interpersonal effectiveness, and mindfulness skills. Meditation resources will be offered. Therapist will teach TIPP skills: Temperature, Intense exercise, Paced breathing, and Paired Muscle Relaxation.    Objective #B   Client will learn to identify negative thoughts that are present and use cbt strategies to diffuse anxiety.  Status: Continued: January 15, 2021     Intervention(s)   Therapist will teach cognitive distortion identification and coping strategies.    Objective #C   Client will engage in positive self-care.  Status: Continued: January 15, 2021     Intervention(s)   Therapist will teach self-care goals:  Maintain balance in schedule (time for self/others,  relaxation/activities, leisure/tasks, home/out of the house), assert needs and set limits with others, challenge negative thoughts/use affirming and encouraging self-talk, engage with support people on regular basis, practice behavior activation behaviors (sleep hygiene, balanced eating, physical activity, medical needs, personal hygiene), acknowledge and accept your feelings.      Patient has reviewed and agreed to the above plan.    Rich Martinez MA, LMFT  January 15, 2021

## 2021-02-02 ENCOUNTER — THERAPY VISIT (OUTPATIENT)
Dept: PHYSICAL THERAPY | Facility: CLINIC | Age: 51
End: 2021-02-02
Payer: COMMERCIAL

## 2021-02-02 DIAGNOSIS — M25.559 HIP PAIN: ICD-10-CM

## 2021-02-02 PROCEDURE — 97140 MANUAL THERAPY 1/> REGIONS: CPT | Mod: GP | Performed by: PHYSICAL THERAPIST

## 2021-02-02 PROCEDURE — 97110 THERAPEUTIC EXERCISES: CPT | Mod: GP | Performed by: PHYSICAL THERAPIST

## 2021-02-09 ENCOUNTER — THERAPY VISIT (OUTPATIENT)
Dept: PHYSICAL THERAPY | Facility: CLINIC | Age: 51
End: 2021-02-09
Payer: COMMERCIAL

## 2021-02-09 DIAGNOSIS — M25.559 HIP PAIN: ICD-10-CM

## 2021-02-09 PROCEDURE — 97112 NEUROMUSCULAR REEDUCATION: CPT | Mod: GP | Performed by: PHYSICAL THERAPIST

## 2021-02-09 PROCEDURE — 97110 THERAPEUTIC EXERCISES: CPT | Mod: GP | Performed by: PHYSICAL THERAPIST

## 2021-02-09 PROCEDURE — 97140 MANUAL THERAPY 1/> REGIONS: CPT | Mod: GP | Performed by: PHYSICAL THERAPIST

## 2021-02-16 ENCOUNTER — THERAPY VISIT (OUTPATIENT)
Dept: PHYSICAL THERAPY | Facility: CLINIC | Age: 51
End: 2021-02-16
Payer: COMMERCIAL

## 2021-02-16 DIAGNOSIS — M25.559 HIP PAIN: ICD-10-CM

## 2021-02-16 PROCEDURE — 97140 MANUAL THERAPY 1/> REGIONS: CPT | Mod: GP | Performed by: PHYSICAL THERAPIST

## 2021-02-16 PROCEDURE — 97112 NEUROMUSCULAR REEDUCATION: CPT | Mod: GP | Performed by: PHYSICAL THERAPIST

## 2021-02-16 PROCEDURE — 97110 THERAPEUTIC EXERCISES: CPT | Mod: GP | Performed by: PHYSICAL THERAPIST

## 2021-02-24 ENCOUNTER — ALLIED HEALTH/NURSE VISIT (OUTPATIENT)
Dept: NURSING | Facility: CLINIC | Age: 51
End: 2021-02-24
Payer: COMMERCIAL

## 2021-02-24 DIAGNOSIS — Z23 NEED FOR SHINGLES VACCINE: Primary | ICD-10-CM

## 2021-02-24 PROCEDURE — 99207 PR NO CHARGE NURSE ONLY: CPT

## 2021-02-24 PROCEDURE — 90471 IMMUNIZATION ADMIN: CPT

## 2021-02-24 PROCEDURE — 90750 HZV VACC RECOMBINANT IM: CPT

## 2021-02-24 NOTE — PROGRESS NOTES
Prior to immunization administration, verified patients identity using patient s name and date of birth. Please see Immunization Activity for additional information.     Screening Questionnaire for Adult Immunization    Are you sick today?   No   Do you have allergies to medications, food, a vaccine component or latex?   No   Have you ever had a serious reaction after receiving a vaccination?   No   Do you have a long-term health problem with heart, lung, kidney, or metabolic disease (e.g., diabetes), asthma, a blood disorder, no spleen, complement component deficiency, a cochlear implant, or a spinal fluid leak?  Are you on long-term aspirin therapy?   No   Do you have cancer, leukemia, HIV/AIDS, or any other immune system problem?   No   Do you have a parent, brother, or sister with an immune system problem?   No   In the past 3 months, have you taken medications that affect  your immune system, such as prednisone, other steroids, or anticancer drugs; drugs for the treatment of rheumatoid arthritis, Crohn s disease, or psoriasis; or have you had radiation treatments?   No   Have you had a seizure, or a brain or other nervous system problem?   No   During the past year, have you received a transfusion of blood or blood    products, or been given immune (gamma) globulin or antiviral drug?   No   For women: Are you pregnant or is there a chance you could become       pregnant during the next month?   No   Have you received any vaccinations in the past 4 weeks?   No     Immunization questionnaire answers were all negative.        Per orders of Brooke Delgado CNP, injection of Shingrix given by Neeru Sheets MA. Patient instructed to remain in clinic for 15 minutes afterwards, and to report any adverse reaction to me immediately.       Screening performed by Neeru Sheets MA on 2/24/2021 at 11:48 AM.

## 2021-02-26 ENCOUNTER — VIRTUAL VISIT (OUTPATIENT)
Dept: PSYCHOLOGY | Facility: CLINIC | Age: 51
End: 2021-02-26
Payer: COMMERCIAL

## 2021-02-26 DIAGNOSIS — F43.22 ADJUSTMENT DISORDER WITH ANXIETY: Primary | ICD-10-CM

## 2021-02-26 PROCEDURE — 90834 PSYTX W PT 45 MINUTES: CPT | Mod: GT | Performed by: MARRIAGE & FAMILY THERAPIST

## 2021-02-26 NOTE — PROGRESS NOTES
Progress Note    Patient Name: Laya Zuleta  Date: February 26, 2021             Service Type: Individual  Video Visit: Yes     Telemedicine Visit: The patient's condition can be safely assessed and treated via synchronous audio and visual telemedicine encounter.    Reason for Telemedicine Visit: Services only offered telehealth and due to COVID-19 restrictions  Originating Site (Patient Location): Patient's home  Distant Site (Provider Location): Provider Remote Setting  Consent:  The patient/guardian has verbally consented to: the potential risks and benefits of telemedicine (video visit) versus in person care; bill my insurance or make self-payment for services provided; and responsibility for payment of non-covered services.   Mode of Communication:  Video Conference via Doxy  As the provider I attest to compliance with applicable laws and regulations related to telemedicine.      Session Start Time: 9:30  Session End Time: 10:15     Session Length: 45 min    Session #: 17    Attendees: Client attended alone     Treatment Plan Last Reviewed: January 15, 2021    DATA  Interactive Complexity: No  Crisis: No       Progress Since Last Session (Related to Symptoms / Goals / Homework):   Symptoms: Worsening during pandemic.    Homework: Achieved / completed to satisfaction with strong self-care performance.      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     Intervention:   Reviewed self-care goals, performance and  strategies. Client reports she is taking action to make plans for the future in order to be more hopeful about the end of the pandemic conditions of remote work and little social connection. She says a return to psychiatric medication, treatment for her hips and a new mattress continue to help with mood improvemenet. Discussed additional actions to improve hopefulness.  Processed emotions in session, validated, supportive counseling.    ASSESSMENT: Current Emotional / Mental Status (status of significant symptoms):   Risk status (Self / Other harm or suicidal ideation)   Patient denies current fears or concerns for personal safety.   Patient denies current or recent suicidal ideation or behaviors.   Patientdenies current or recent homicidal ideation or behaviors.   Patient denies current or recent self injurious behavior or ideation.   Patient denies other safety concerns.   Patient reports there has been no change in risk factors since their last session.     Patientreports there has been no change in protective factors since their last session.     Recommended that patient call 911 or go to the local ED should there be a change in any of these risk factors.     Appearance:   Appropriate    Eye Contact:   Good    Psychomotor Behavior: Normal    Attitude:   Cooperative    Orientation:   All   Speech    Rate / Production: Normal     Volume:  Normal    Mood:    Normal   Affect:    Appropriate    Thought Content:  Clear    Thought Form:  Coherent  Logical    Insight:    Good      Medication Review:   No changes to current psychiatric medication(s)     Medication Compliance:   Yes     Changes in Health Issues:   None reported     Chemical Use Review:   Substance Use: Chemical use reviewed, no active concerns identified      Tobacco Use: No current tobacco use.      Diagnosis:  Adjustment disorder with anxiety    Collateral Reports Completed:   Not Applicable    PLAN: (Patient Tasks / Therapist Tasks / Other)  Client  will focus on sleep hygiene, nutrition and general self-care to improve mood. She will take action to make plans for a more hopeful future.      Chung Martinez MA, LMFT                                                         ______________________________________________________________________                                                 Treatment Plan    Client's Name: Laya Zuleta  YOB: 1970    Date: January 15, 2021    DSM-V Diagnoses: 309.24 - Adjustment Disorder with Anxiety  ; V71.09 - No Diagnosis  Psychosocial / Contextual Factors: daughter with bone disease  WHODAS: 13    Referral / Collaboration:  Referral to another professional/service is not indicated at this time..    Anticipated number of session or this episode of care: ongoing    Measurable Treatment Goal(s) related to diagnosis / functional impairment(s)   I will know I've met my goal when I learn tools to cope with and lower my anxiety.     Goal 1: Client will experience a significant reduction in ZAIRA-7 scores.     Objective #A   Client will learn and integrate DBT/CBT strategies to more effectively manage emotions and relationships.   Status: Continued: January 15, 2021     Intervention(s)   Therapist will teach emotional regulation, distress tolerance, interpersonal effectiveness, and mindfulness skills. Meditation resources will be offered. Therapist will teach TIPP skills: Temperature, Intense exercise, Paced breathing, and Paired Muscle Relaxation.    Objective #B   Client will learn to identify negative thoughts that are present and use cbt strategies to diffuse anxiety.  Status: Continued: January 15, 2021     Intervention(s)   Therapist will teach cognitive distortion identification and coping strategies.    Objective #C   Client will engage in positive self-care.  Status: Continued: January 15, 2021     Intervention(s)   Therapist will teach self-care goals:  Maintain balance in schedule (time for  self/others, relaxation/activities, leisure/tasks, home/out of the house), assert needs and set limits with others, challenge negative thoughts/use affirming and encouraging self-talk, engage with support people on regular basis, practice behavior activation behaviors (sleep hygiene, balanced eating, physical activity, medical needs, personal hygiene), acknowledge and accept your feelings.      Patient has reviewed and agreed to the above plan.    Rich Martinez MA, LMFT  January 15, 2021

## 2021-03-02 ENCOUNTER — THERAPY VISIT (OUTPATIENT)
Dept: PHYSICAL THERAPY | Facility: CLINIC | Age: 51
End: 2021-03-02
Payer: COMMERCIAL

## 2021-03-02 DIAGNOSIS — M25.559 HIP PAIN: ICD-10-CM

## 2021-03-02 PROCEDURE — 97112 NEUROMUSCULAR REEDUCATION: CPT | Mod: GP | Performed by: PHYSICAL THERAPIST

## 2021-03-02 PROCEDURE — 97110 THERAPEUTIC EXERCISES: CPT | Mod: GP | Performed by: PHYSICAL THERAPIST

## 2021-03-02 PROCEDURE — 97140 MANUAL THERAPY 1/> REGIONS: CPT | Mod: GP | Performed by: PHYSICAL THERAPIST

## 2021-03-11 ENCOUNTER — THERAPY VISIT (OUTPATIENT)
Dept: PHYSICAL THERAPY | Facility: CLINIC | Age: 51
End: 2021-03-11
Payer: COMMERCIAL

## 2021-03-11 DIAGNOSIS — M25.559 HIP PAIN: ICD-10-CM

## 2021-03-11 PROCEDURE — 97112 NEUROMUSCULAR REEDUCATION: CPT | Mod: GP | Performed by: PHYSICAL THERAPIST

## 2021-03-11 PROCEDURE — 97110 THERAPEUTIC EXERCISES: CPT | Mod: GP | Performed by: PHYSICAL THERAPIST

## 2021-03-11 PROCEDURE — 97140 MANUAL THERAPY 1/> REGIONS: CPT | Mod: GP | Performed by: PHYSICAL THERAPIST

## 2021-03-15 ENCOUNTER — VIRTUAL VISIT (OUTPATIENT)
Dept: SURGERY | Facility: CLINIC | Age: 51
End: 2021-03-15
Payer: COMMERCIAL

## 2021-03-15 VITALS — BODY MASS INDEX: 27.74 KG/M2 | WEIGHT: 183 LBS | HEIGHT: 68 IN

## 2021-03-15 DIAGNOSIS — E66.3 OVERWEIGHT (BMI 25.0-29.9): ICD-10-CM

## 2021-03-15 PROCEDURE — 97803 MED NUTRITION INDIV SUBSEQ: CPT | Mod: GT | Performed by: DIETITIAN, REGISTERED

## 2021-03-15 ASSESSMENT — MIFFLIN-ST. JEOR: SCORE: 1485.64

## 2021-03-15 NOTE — PATIENT INSTRUCTIONS
Ish Asif!    Here's a summary of your goals this month:    1. Increase walks to 20-30 minutes. Add in 2 days/week of strength training    2. Avoid skipping lunch. Try to add more protein and veggies to your lunch.     3. Eat breakfast within an hour of waking up    4. Aim for 48oz of fluid each day        Otherwise, keep up the good work! Let's plan on following up in 1-2 months. This can be scheduled via the call center at . Have a great week!      Ingrid Mendez RD, LD  Clinical Dietitian

## 2021-03-15 NOTE — PROGRESS NOTES
Laya is a 50 year old who is being evaluated via a billable video visit.      How would you like to obtain your AVS? MyChart  If the video visit is dropped, the invitation should be resent by: Text to cell phone: [phone]  Will anyone else be joining your video visit? No      Video Start Time: 11:29am      Video-Visit Details    Type of service:  Video Visit    Video End Time: 11:48am    Originating Location (pt. Location): Home    Distant Location (provider location): Provider Remote Setting    Platform used for Video Visit: [facility]     MEDICAL WEIGHT LOSS FOLLOW UP    Reason for visit: medical weight loss     DIAGNOSIS:  Overweight    ANTHROPOMETRICS:  Initial Weight: 193 pounds (10/5/2020)  Previous Weight:  184 pounds (1/4/2021)  Current Weight:  183 lbs 0 oz    Weight Change: -1lb since last appointment  -10lbs overall   BMI: Body mass index is 28.24 kg/m .     Weight Loss Medications:   Phentermine    NUTRITION HISTORY:  Breakfast: [wakes at 7am, eats at 9am] Naseem Bi breakfast sandwich - turkey delight +/- fruit  eggs on weekends  Lunch:  [1pm] cottage cheese + crackers + fruit   Dinner:  [6pm] protein + veggies + starch  pasta on weekends   Snacks: [evenings] fruit and dark chocolate  cheese + crackers + hummus on weekends , girl  cookies   Beverage choices: water/enhanced water, coffee (16oz + SF creamer), wine or Danish coffee (occasionally)  Eating behaviors: boredom eating - tries to stick with fruit   Dining Out: rare    Exercise:  Type: walking, physical therapy  Duration: 15-30  Frequency: daily     Additional Information: Pt successful in adding more protein to lunch. Ongoing challenges with water intake and increasing physical activity r/t hip bursa pain. Occasionally skips lunch. Reviewed rationale for consistent meal intake. Pt feels she struggles to stick with meal plan on weekends.     EVALUATION/PROGRESS TOWARDS GOALS:  Previous Goals:   Continue with daily walks, add in  Oculus workout 2x/week - not met  Include protein at lunch - met  Increase fluids to 64oz per day - not met  Scale holiday treats on a 1-10 scale; eat high-value treats mindfully and skip the rest - met    Previous Nutrition Diagnosis:  Overweight related to excess energy intake and self-monitoring deficit as evidenced by BMI of 28.55 kg/m2.  No change, modified below     Current Nutrition Diagnosis:   Overweight related to excess energy intake and self-monitoring deficit as evidenced by BMI of 28.24 kg/m2.    INTERVENTION:    Nutrition Prescription:  Recommend modified nutrient intake by decreasing energy intake.    Implementation:    Nutrition Education (Content):    Discussed previous goals and determined new goals    Encourage physical activity    Supported patient in attempted weight loss and behavior changes    Congratulated patient on successful weight loss     Patient verbalizes understanding of diet by stating she will consistently eat lunch    Anticipate fair-good compliance    Goals:  Increase walks to 20-30 minutes + add strength training 2x/week  Avoid skipping lunch; try to add more protein and vegetables  Eat breakfast within an hour of waking up  Drink at least 48oz of water each day    Follow Up/Monitoring:  Other  -  patient to follow up in 4-8 weeks    Time Spent With Patient:  19 Minutes    Ingrid Mendez RD, LD  Clinical Dietitian

## 2021-03-29 ENCOUNTER — IMMUNIZATION (OUTPATIENT)
Dept: NURSING | Facility: CLINIC | Age: 51
End: 2021-03-29
Payer: COMMERCIAL

## 2021-03-29 PROCEDURE — 0001A PR COVID VAC PFIZER DIL RECON 30 MCG/0.3 ML IM: CPT

## 2021-03-29 PROCEDURE — 91300 PR COVID VAC PFIZER DIL RECON 30 MCG/0.3 ML IM: CPT

## 2021-03-30 ENCOUNTER — THERAPY VISIT (OUTPATIENT)
Dept: PHYSICAL THERAPY | Facility: CLINIC | Age: 51
End: 2021-03-30
Payer: COMMERCIAL

## 2021-03-30 DIAGNOSIS — M25.559 HIP PAIN: ICD-10-CM

## 2021-03-30 PROCEDURE — 97110 THERAPEUTIC EXERCISES: CPT | Mod: GP | Performed by: PHYSICAL THERAPIST

## 2021-03-30 PROCEDURE — 97140 MANUAL THERAPY 1/> REGIONS: CPT | Mod: GP | Performed by: PHYSICAL THERAPIST

## 2021-03-30 PROCEDURE — 97112 NEUROMUSCULAR REEDUCATION: CPT | Mod: GP | Performed by: PHYSICAL THERAPIST

## 2021-03-30 ASSESSMENT — ACTIVITIES OF DAILY LIVING (ADL)
TWISTING/PIVOTING_ON_INVOLVED_LEG: SLIGHT DIFFICULTY
WALKING_INITIALLY: NO DIFFICULTY AT ALL
GETTING_INTO_AND_OUT_OF_AN_AVERAGE_CAR: NO DIFFICULTY AT ALL
WALKING_UP_STEEP_HILLS: NO DIFFICULTY AT ALL
GOING_DOWN_1_FLIGHT_OF_STAIRS: NO DIFFICULTY AT ALL
STANDING_FOR_15_MINUTES: NO DIFFICULTY AT ALL
WALKING_APPROXIMATELY_10_MINUTES: NO DIFFICULTY AT ALL
WALKING_DOWN_STEEP_HILLS: NO DIFFICULTY AT ALL
STEPPING_UP_AND_DOWN_CURBS: NO DIFFICULTY AT ALL
LIGHT_TO_MODERATE_WORK: SLIGHT DIFFICULTY
WALKING_15_MINUTES_OR_GREATER: NO DIFFICULTY AT ALL
DEEP_SQUATTING: SLIGHT DIFFICULTY
ROLLING_OVER_IN_BED: NO DIFFICULTY AT ALL
GETTING_INTO_AND_OUT_OF_A_BATHTUB: NO DIFFICULTY AT ALL
HOS_ADL_ITEM_SCORE_TOTAL: 65
HOS_ADL_HIGHEST_POTENTIAL_SCORE: 68
HOS_ADL_COUNT: 17
SITTING_FOR_15_MINUTES: NO DIFFICULTY AT ALL
RECREATIONAL_ACTIVITIES: NO DIFFICULTY AT ALL
HOS_ADL_SCORE(%): 95.59
HEAVY_WORK: NO DIFFICULTY AT ALL
PUTTING_ON_SOCKS_AND_SHOES: NO DIFFICULTY AT ALL
GOING_UP_1_FLIGHT_OF_STAIRS: NO DIFFICULTY AT ALL

## 2021-03-30 NOTE — PROGRESS NOTES
Subjective:  HPI  Physical Exam                    Objective:  System    Physical Exam    General     ROS    Assessment/Plan:    DISCHARGE REPORT    Progress reporting period is from 1-26-21 to 3-30-21.       SUBJECTIVE    Subjective: Pt traveled to [place] and felt really good with walking while on vacation. She was a bit worse upon return after traveling  but better again today  She is comfortable with HEP and walking 30-40' at a time without increased pain   Current Pain level: 2/10.     Previous pain level was  6/10  .   Changes in function:  Yes (See Goal flowsheet attached for changes in current functional level)  Adverse reaction to treatment or activity: None    OBJECTIVE  Changes noted in objective findings:  Yes, improved ROM, strength and function.  Objective: Lumbar AROM Wnl,AROM right hip flex 123,, left hip flex 127, MMT right glut med 4+/5, left glut med  4+/5, right glut max 5-/5, left glut max 5-/5, mild tenderness left piriformis and left ITB     ASSESSMENT/PLAN  Updated problem list and treatment plan: Diagnosis 1:  B hip pain  Pain -  hot/cold therapy, manual therapy and education  Decreased ROM/flexibility - therapeutic exercise and home program  Decreased strength - therapeutic exercise, therapeutic activities and home program  STG/LTGs have been met or progress has been made towards goals:  Yes (See Goal flow sheet completed today.)  Assessment of Progress: The patient has met all of their long term goals.  Self Management Plans:  Patient is independent in a home treatment program.  I have re-evaluated this patient and find that the nature, scope, duration and intensity of the therapy is appropriate for the medical condition of the patient.  Laya continues to require the following intervention to meet STG and LTG's:  PT intervention is no longer required to meet STG/LTG.    Recommendations:  This patient is ready to be discharged from therapy and continue their home treatment  program.    Please refer to the daily flowsheet for treatment today, total treatment time and time spent performing 1:1 timed codes.

## 2021-04-05 ENCOUNTER — VIRTUAL VISIT (OUTPATIENT)
Dept: SURGERY | Facility: CLINIC | Age: 51
End: 2021-04-05
Payer: COMMERCIAL

## 2021-04-05 VITALS — HEIGHT: 68 IN | WEIGHT: 184 LBS | BODY MASS INDEX: 27.89 KG/M2

## 2021-04-05 DIAGNOSIS — E66.3 OVERWEIGHT (BMI 25.0-29.9): Primary | ICD-10-CM

## 2021-04-05 DIAGNOSIS — M25.559 HIP PAIN: ICD-10-CM

## 2021-04-05 PROCEDURE — 99213 OFFICE O/P EST LOW 20 MIN: CPT | Mod: 95 | Performed by: FAMILY MEDICINE

## 2021-04-05 RX ORDER — TOPIRAMATE SPINKLE 25 MG/1
25 CAPSULE ORAL 2 TIMES DAILY
COMMUNITY
End: 2022-04-12

## 2021-04-05 ASSESSMENT — MIFFLIN-ST. JEOR: SCORE: 1490.18

## 2021-04-05 NOTE — PROGRESS NOTES
Laya is a 50 year old who is being evaluated via a billable video visit.      If the video visit is dropped, the invitation should be resent by: Text to cell phone: [phone]  Will anyone else be joining your video visit? No      Video-Visit Details    Type of service:  Video Visit    Video Start Time: 11:59 AM    Video End Time:12:14        Originating Location (pt. Location): Home    Distant Location (provider location):  [facility] [place]     Platform used for Video Visit: [facility]    Bariatric Care Clinic Non Surgical Follow up Visit   Date of visit: 4/5/2021  Physician: EFRAIN Bahena MD, MD  Primary Care is Suzanna Smith.  Laya Zuleta   50 year old  female     Initial Weight: 193#  Initial BMI: 29.78  Today's Weight:   Wt Readings from Last 1 Encounters:   04/05/21 83.5 kg (184 lb)     Body mass index is 28.39 kg/m .           Assessment and Plan   Assessment: Laya is a 50 year old year old female who presents for medical weight management.      Plan:     Diagnosis Comments   1. Overweight (BMI 25.0-29.9)  Patient was congratulated on her success thus far. Healthy habits to assist with further weight loss were discussed. She will work on adding some resistance training and will walk as much as she can tolerate. She will continue the phentermine but will cut down to 1/2 tab. She will try adding the topamax already prescribed.   2. Hip pain  This may improve with healthy habits and weight loss.       Follow up in 3 months with myself           INTERIM HISTORY  Patient is taking phentermine and finds that it keeps her awake if she takes a full tablet. She tolerates a half tablet. She took topamax in the past for appetite control and didn't think it helped but she did tolerate it.     DIETARY HISTORY  Meals Per Day: 3  Eating Protein First?: usually  Food Diary: B: breakfast sandwich L:cottage cheese or hummus and veggies, sometimes skips D:lean  "meat and vegetables  Snacks Per Day: occasional  Typical Snack: dark chocolate, some gummy  bears  Fluid Intake: working on it  Portion Control: yes  Calorie Containing Beverages: occasional wine  Typical Protein Food Choices: lean meat, cottage cheese, eggs  Choosing Whole Grains: usually  Meals at Restaurant per week:0-2    Positive Changes Since Last Visit: working on protein with small lunch  Struggling With: exercise (hip bursitis)    Knowledgeable in Reading Food Labels: yes  Getting Adequate Protein: working on it  Sleeping 7-8 hours/day struggling  Stress management not discussed    PHYSICAL ACTIVITY PATTERNS:  Cardiovascular: some walking, hip bursitis  Strength Training: none    REVIEW OF SYSTEMS  GENERAL/CONSTITUTIONAL:  Fatigue: no  HEENT:  Vision changes, glaucoma: no  CARDIOVASCULAR:  Chest Pain with Exertion: no  PULMONARY:  Dyspnea on exertion: someitmes  NEUROLOGIC:  Paresthesias: none  PSYCHIATRIC:  Moods: stable  MUSCULOSKELETAL/RHEUMATOLOGIC  Arthralgias: yes  Myalgias: yes  ENDOCRINE:  Monitoring Blood Sugars: na  Sugars Well Controlled: na       Patient Profile   Social History     Social History Narrative     Not on file        Past Medical History   Past Medical History:   Diagnosis Date     Acne 11/29/2010     CARDIOVASCULAR SCREENING; LDL GOAL LESS THAN 160 11/29/2010     Patient Active Problem List   Diagnosis     Chronic maxillary sinusitis     Acne     Family history of breast cancer     Overweight (BMI 25.0-29.9)     Premenstrual dysphoric disorder     Hip pain     [unfilled]    Past Surgical History  She has a past surgical history that includes sinus surgery; Laparoscopic appendectomy child; and Colonoscopy (N/A, 11/19/2018).     Examination   Ht 1.715 m (5' 7.5\")   Wt 83.5 kg (184 lb)   BMI 28.39 kg/m    GENERAL: Healthy, alert and no distress  EYES: Eyes grossly normal to inspection.  No discharge or erythema, or obvious scleral/conjunctival abnormalities.  RESP: No " audible wheeze, cough, or visible cyanosis.  No visible retractions or increased work of breathing.    SKIN: Visible skin clear. No significant rash, abnormal pigmentation or lesions.  NEURO: Cranial nerves grossly intact.  Mentation and speech appropriate for age.  PSYCH: Mentation appears normal, affect normal/bright, judgement and insight intact, normal speech and appearance well-groomed.       Counseling:   We reviewed the important post op bariatric recommendations:  -eating 3 meals daily  -eating protein first, getting >60gm protein daily  -eating slowly, chewing food well  -avoiding/limiting calorie containing beverages  -limiting starchy vegetables and carbohydrates, choosing wheat, not white with breads,   crackers, pastas, josue, bagels, tortillas, rice  -limiting restaurant or cafeteria eating to twice a week or less    We discussed the importance of restorative sleep and stress management in maintaining a healthy weight.  We discussed the National Weight Control Registry healthy weight maintenance strategies and ways to optimize metabolism.  We discussed the importance of physical activity including cardiovascular and strength training in maintaining a healthier weight.    Total time spent on the date of this encounter doing: chart review, review of test results, patient visit, physical exam, education, counseling, developing plan of care and documenting = 24 minutes.         EFRAIN Bahena MD  [facility]

## 2021-04-05 NOTE — PATIENT INSTRUCTIONS

## 2021-04-19 ENCOUNTER — IMMUNIZATION (OUTPATIENT)
Dept: NURSING | Facility: CLINIC | Age: 51
End: 2021-04-19
Attending: INTERNAL MEDICINE
Payer: COMMERCIAL

## 2021-04-19 PROCEDURE — 91300 PR COVID VAC PFIZER DIL RECON 30 MCG/0.3 ML IM: CPT

## 2021-04-19 PROCEDURE — 0002A PR COVID VAC PFIZER DIL RECON 30 MCG/0.3 ML IM: CPT

## 2021-04-23 ENCOUNTER — VIRTUAL VISIT (OUTPATIENT)
Dept: PSYCHOLOGY | Facility: CLINIC | Age: 51
End: 2021-04-23
Payer: COMMERCIAL

## 2021-04-23 DIAGNOSIS — F43.22 ADJUSTMENT DISORDER WITH ANXIETY: Primary | ICD-10-CM

## 2021-04-23 PROCEDURE — 90834 PSYTX W PT 45 MINUTES: CPT | Mod: 95 | Performed by: MARRIAGE & FAMILY THERAPIST

## 2021-04-23 NOTE — PROGRESS NOTES
Progress Note    Patient Name: Laya Zuleta  Date: April 23, 2021              Service Type: Individual  Video Visit: Yes     Telemedicine Visit: The patient's condition can be safely assessed and treated via synchronous audio and visual telemedicine encounter.    Reason for Telemedicine Visit: Services only offered telehealth and due to COVID-19 restrictions  Originating Site (Patient Location): Patient's home  Distant Site (Provider Location): Provider Remote Setting  Consent:  The patient/guardian has verbally consented to: the potential risks and benefits of telemedicine (video visit) versus in person care; bill my insurance or make self-payment for services provided; and responsibility for payment of non-covered services.   Mode of Communication:  Video Conference via Doxy  As the provider I attest to compliance with applicable laws and regulations related to telemedicine.      Session Start Time: 9:30  Session End Time: 10:15     Session Length: 45 min    Session #: 18    Attendees: Client attended alone     Treatment Plan Last Reviewed: January 15, 2021  Client requested treatment review during next session.    DATA  Interactive Complexity: No  Crisis: No       Progress Since Last Session (Related to Symptoms / Goals / Homework):   Symptoms: Improving .    Homework: Achieved / completed to satisfaction with strong self-care performance.      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     Intervention:   Reviewed  self-care goals, performance and strategies. Client reports she is engaging in improved self-care, seeking treatment for her hip and being more socially connected. She says the pandemic is less onerous and oppressive now that vaccines are underway. She says getting good school performance from her daughter continues to be a struggle. Processed emotions in session, validated, supportive counseling.    ASSESSMENT: Current Emotional / Mental Status (status of significant symptoms):   Risk status (Self / Other harm or suicidal ideation)   Patient denies current fears or concerns for personal safety.   Patient denies current or recent suicidal ideation or behaviors.   Patientdenies current or recent homicidal ideation or behaviors.   Patient denies current or recent self injurious behavior or ideation.   Patient denies other safety concerns.   Patient reports there has been no change in risk factors since their last session.     Patientreports there has been no change in protective factors since their last session.     Recommended that patient call 911 or go to the local ED should there be a change in any of these risk factors.     Appearance:   Appropriate    Eye Contact:   Good    Psychomotor Behavior: Normal    Attitude:   Cooperative    Orientation:   All   Speech    Rate / Production: Normal     Volume:  Normal    Mood:    Normal   Affect:    Appropriate    Thought Content:  Clear    Thought Form:  Coherent  Logical    Insight:    Good      Medication Review:   No changes to current psychiatric medication(s)     Medication Compliance:   Yes     Changes in Health Issues:   None reported     Chemical Use Review:   Substance Use: Chemical use reviewed, no active concerns identified      Tobacco Use: No current tobacco use.      Diagnosis:  Adjustment disorder with anxiety    Collateral Reports Completed:   Not Applicable    PLAN: (Patient Tasks / Therapist Tasks / Other)  Client will focus on sleep hygiene, nutrition  and general self-care to improve mood.       Chung Martinez MA, LMFT                                                         ______________________________________________________________________                                                 Treatment Plan    Client's Name: Laya Zuleta  YOB: 1970    Date: January 15, 2021    DSM-V Diagnoses: 309.24 - Adjustment Disorder with Anxiety  ; V71.09 - No Diagnosis  Psychosocial / Contextual Factors: daughter with bone disease  WHODAS: 13    Referral / Collaboration:  Referral to another professional/service is not indicated at this time..    Anticipated number of session or this episode of care: ongoing    Measurable Treatment Goal(s) related to diagnosis / functional impairment(s)   I will know I've met my goal when I learn tools to cope with and lower my anxiety.     Goal 1: Client will experience a significant reduction in ZAIRA-7 scores.     Objective #A   Client will learn and integrate DBT/CBT strategies to more effectively manage emotions and relationships.   Status: Continued: January 15, 2021     Intervention(s)   Therapist will teach emotional regulation, distress tolerance, interpersonal effectiveness, and mindfulness skills. Meditation resources will be offered. Therapist will teach TIPP skills: Temperature, Intense exercise, Paced breathing, and Paired Muscle Relaxation.    Objective #B   Client will learn to identify negative thoughts that are present and use cbt strategies to diffuse anxiety.  Status: Continued: January 15, 2021     Intervention(s)   Therapist will teach cognitive distortion identification and coping strategies.    Objective #C   Client will engage in positive self-care.  Status: Continued: January 15, 2021     Intervention(s)   Therapist will teach self-care goals:  Maintain balance in schedule (time for self/others, relaxation/activities, leisure/tasks, home/out of the house), assert needs and set limits with  others, challenge negative thoughts/use affirming and encouraging self-talk, engage with support people on regular basis, practice behavior activation behaviors (sleep hygiene, balanced eating, physical activity, medical needs, personal hygiene), acknowledge and accept your feelings.      Patient has reviewed and agreed to the above plan.    Rich Martinez MA, LMFT  January 15, 2021

## 2021-06-08 PROBLEM — M25.559 HIP PAIN: Status: RESOLVED | Noted: 2021-01-26 | Resolved: 2021-06-08

## 2021-06-22 ENCOUNTER — MYC MEDICAL ADVICE (OUTPATIENT)
Dept: PEDIATRICS | Facility: CLINIC | Age: 51
End: 2021-06-22

## 2021-07-02 ENCOUNTER — VIRTUAL VISIT (OUTPATIENT)
Dept: PSYCHOLOGY | Facility: CLINIC | Age: 51
End: 2021-07-02
Payer: COMMERCIAL

## 2021-07-02 DIAGNOSIS — F43.22 ADJUSTMENT DISORDER WITH ANXIETY: Primary | ICD-10-CM

## 2021-07-02 PROCEDURE — 90834 PSYTX W PT 45 MINUTES: CPT | Mod: 95 | Performed by: MARRIAGE & FAMILY THERAPIST

## 2021-07-02 ASSESSMENT — ANXIETY QUESTIONNAIRES
5. BEING SO RESTLESS THAT IT IS HARD TO SIT STILL: NOT AT ALL
2. NOT BEING ABLE TO STOP OR CONTROL WORRYING: NOT AT ALL
3. WORRYING TOO MUCH ABOUT DIFFERENT THINGS: NOT AT ALL
1. FEELING NERVOUS, ANXIOUS, OR ON EDGE: NOT AT ALL
7. FEELING AFRAID AS IF SOMETHING AWFUL MIGHT HAPPEN: NOT AT ALL
6. BECOMING EASILY ANNOYED OR IRRITABLE: NOT AT ALL
GAD7 TOTAL SCORE: 0
IF YOU CHECKED OFF ANY PROBLEMS ON THIS QUESTIONNAIRE, HOW DIFFICULT HAVE THESE PROBLEMS MADE IT FOR YOU TO DO YOUR WORK, TAKE CARE OF THINGS AT HOME, OR GET ALONG WITH OTHER PEOPLE: NOT DIFFICULT AT ALL

## 2021-07-02 ASSESSMENT — PATIENT HEALTH QUESTIONNAIRE - PHQ9
SUM OF ALL RESPONSES TO PHQ QUESTIONS 1-9: 0
5. POOR APPETITE OR OVEREATING: NOT AT ALL

## 2021-07-02 NOTE — PROGRESS NOTES
Progress Note    Patient Name: Laya Zuleta  Date: July 2, 2021               Service Type: Individual  Video Visit: Yes     Telemedicine Visit: The patient's condition can be safely assessed and treated via synchronous audio and visual telemedicine encounter.    Reason for Telemedicine Visit: Services only offered telehealth and due to COVID-19 restrictions  Originating Site (Patient Location): Patient's home  Distant Site (Provider Location): Provider Remote Setting  Consent:  The patient/guardian has verbally consented to: the potential risks and benefits of telemedicine (video visit) versus in person care; bill my insurance or make self-payment for services provided; and responsibility for payment of non-covered services.   Mode of Communication:  Video Conference via Doxy  As the provider I attest to compliance with applicable laws and regulations related to telemedicine.      Session Start Time: 9:30  Session End Time: 10:15     Session Length: 45 min    Session #: 19    Attendees: Client attended alone     Treatment Plan Last Reviewed: July 2, 2021    DATA  Interactive Complexity: No  Crisis: No       Progress Since Last Session (Related to Symptoms / Goals / Homework):   Symptoms: Improving .    Homework: Achieved / completed to satisfaction with strong self-care performance.      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     Intervention:   Reviewed skills and actions that have been key to improvement. Client reports she is greatly helped by the  ability to connect socially.  Processed emotions in session, validated, supportive counseling.    ASSESSMENT: Current Emotional / Mental Status (status of significant symptoms):   Risk status (Self / Other harm or suicidal ideation)   Patient denies current fears or concerns for personal safety.   Patient denies current or recent suicidal ideation or behaviors.   Patientdenies current or recent homicidal ideation or behaviors.   Patient denies current or recent self injurious behavior or ideation.   Patient denies other safety concerns.   Patient reports there has been no change in risk factors since their last session.     Patientreports there has been no change in protective factors since their last session.     Recommended that patient call 911 or go to the local ED should there be a change in any of these risk factors.     Appearance:   Appropriate    Eye Contact:   Good    Psychomotor Behavior: Normal    Attitude:   Friendly   Orientation:   All   Speech    Rate / Production: Normal     Volume:  Normal    Mood:    Euthymic   Affect:    Appropriate    Thought Content:  Clear    Thought Form:  Coherent  Logical    Insight:    Good      Medication Review:   No changes to current psychiatric medication(s)     Medication Compliance:   Yes     Changes in Health Issues:   None reported     Chemical Use Review:   Substance Use: Chemical use reviewed, no active concerns identified      Tobacco Use: No current tobacco use.      Diagnosis:  Adjustment disorder with anxiety    Collateral Reports Completed:   Not Applicable    PLAN: (Patient Tasks / Therapist Tasks / Other)  Client will focus on skills and actions that have been key to improvement, including  sleep hygiene, nutrition, activity and social connections.       Chung Martinez MA, LMFT                                                         ______________________________________________________________________                                                  Treatment Plan    Client's Name: Laya Zuleta  YOB: 1970    Date: July 2, 2021    DSM-V Diagnoses: 309.24 - Adjustment Disorder with Anxiety  ; V71.09 - No Diagnosis  Psychosocial / Contextual Factors: daughter with bone disease  WHODAS: 13    Referral / Collaboration:  Referral to another professional/service is not indicated at this time..    Anticipated number of session or this episode of care: ongoing    Measurable Treatment Goal(s) related to diagnosis / functional impairment(s)   I will know I've met my goal when I learn tools to cope with and lower my anxiety.     Goal 1: Client will experience a significant reduction in ZAIRA-7 scores.     Objective #A   Client will learn and integrate DBT/CBT strategies to more effectively manage emotions and relationships.   Status: Continued: July 2, 2021    Intervention(s)   Therapist will teach emotional regulation, distress tolerance, interpersonal effectiveness, and mindfulness skills. Meditation resources will be offered. Therapist will teach TIPP skills: Temperature, Intense exercise, Paced breathing, and Paired Muscle Relaxation.    Objective #B   Client will learn to identify negative thoughts that are present and use cbt strategies to diffuse anxiety.  Status: Continued: July 2, 2021    Intervention(s)   Therapist will teach cognitive distortion identification and coping strategies.    Objective #C   Client will engage in positive self-care.  Status: Continued: July 2, 2021     Intervention(s)   Therapist will teach self-care goals:  Maintain balance in schedule (time for self/others, relaxation/activities, leisure/tasks, home/out of the house), assert needs and set limits with others, challenge negative thoughts/use affirming and encouraging self-talk, engage with support people on regular basis, practice behavior activation behaviors (sleep hygiene, balanced eating, physical activity, medical needs, personal hygiene), acknowledge and  accept your feelings.      Patient has reviewed and agreed to the above plan.    Rich Martinez MA, LMFT  July 2, 2021

## 2021-07-03 ASSESSMENT — ANXIETY QUESTIONNAIRES: GAD7 TOTAL SCORE: 0

## 2021-09-28 ENCOUNTER — OFFICE VISIT (OUTPATIENT)
Dept: PEDIATRICS | Facility: CLINIC | Age: 51
End: 2021-09-28
Payer: COMMERCIAL

## 2021-09-28 VITALS
BODY MASS INDEX: 28.99 KG/M2 | RESPIRATION RATE: 14 BRPM | HEART RATE: 79 BPM | TEMPERATURE: 98.1 F | DIASTOLIC BLOOD PRESSURE: 86 MMHG | WEIGHT: 191.3 LBS | OXYGEN SATURATION: 99 % | HEIGHT: 68 IN | SYSTOLIC BLOOD PRESSURE: 126 MMHG

## 2021-09-28 DIAGNOSIS — F32.81 PMDD (PREMENSTRUAL DYSPHORIC DISORDER): ICD-10-CM

## 2021-09-28 DIAGNOSIS — Z00.00 ROUTINE GENERAL MEDICAL EXAMINATION AT A HEALTH CARE FACILITY: Primary | ICD-10-CM

## 2021-09-28 PROCEDURE — 90471 IMMUNIZATION ADMIN: CPT | Performed by: NURSE PRACTITIONER

## 2021-09-28 PROCEDURE — 90682 RIV4 VACC RECOMBINANT DNA IM: CPT | Performed by: NURSE PRACTITIONER

## 2021-09-28 PROCEDURE — 99396 PREV VISIT EST AGE 40-64: CPT | Mod: 25 | Performed by: NURSE PRACTITIONER

## 2021-09-28 RX ORDER — BUPROPION HYDROCHLORIDE 150 MG/1
150 TABLET ORAL EVERY MORNING
Qty: 180 TABLET | Refills: 3 | Status: SHIPPED | OUTPATIENT
Start: 2021-09-28 | End: 2022-10-19

## 2021-09-28 RX ORDER — CITALOPRAM HYDROBROMIDE 10 MG/1
10 TABLET ORAL DAILY
Qty: 90 TABLET | Refills: 3 | Status: SHIPPED | OUTPATIENT
Start: 2021-09-28 | End: 2022-11-01

## 2021-09-28 ASSESSMENT — ENCOUNTER SYMPTOMS
PARESTHESIAS: 0
FEVER: 0
EYE PAIN: 0
ABDOMINAL PAIN: 0
WEAKNESS: 0
HEMATOCHEZIA: 0
SORE THROAT: 0
HEARTBURN: 0
HEMATURIA: 0
MYALGIAS: 0
NERVOUS/ANXIOUS: 0
FREQUENCY: 0
DYSURIA: 0
SHORTNESS OF BREATH: 0
JOINT SWELLING: 0
CHILLS: 0
NAUSEA: 0
ARTHRALGIAS: 0
DIARRHEA: 0
CONSTIPATION: 0
HEADACHES: 0
DIZZINESS: 0
COUGH: 0
PALPITATIONS: 0
BREAST MASS: 0

## 2021-09-28 ASSESSMENT — MIFFLIN-ST. JEOR: SCORE: 1518.29

## 2021-09-28 NOTE — PATIENT INSTRUCTIONS
Jose Bahena and ask about GLP-1 agonists and metformin for weight loss. Ask about cost    Preventive Health Recommendations  Female Ages 50 - 64    Yearly exam: See your health care provider every year in order to  o Review health changes.   o Discuss preventive care.    o Review your medicines if your doctor has prescribed any.      Get a Pap test every three years (unless you have an abnormal result and your provider advises testing more often).    If you get Pap tests with HPV test, you only need to test every 5 years, unless you have an abnormal result.     You do not need a Pap test if your uterus was removed (hysterectomy) and you have not had cancer.    You should be tested each year for STDs (sexually transmitted diseases) if you're at risk.     Have a mammogram every 1 to 2 years.    Have a colonoscopy at age 50, or have a yearly FIT test (stool test). These exams screen for colon cancer.      Have a cholesterol test every 5 years, or more often if advised.    Have a diabetes test (fasting glucose) every three years. If you are at risk for diabetes, you should have this test more often.     If you are at risk for osteoporosis (brittle bone disease), think about having a bone density scan (DEXA).    Shots: Get a flu shot each year. Get a tetanus shot every 10 years.    Nutrition:     Eat at least 5 servings of fruits and vegetables each day.    Eat whole-grain bread, whole-wheat pasta and brown rice instead of white grains and rice.    Get adequate Calcium and Vitamin D.     Lifestyle    Exercise at least 150 minutes a week (30 minutes a day, 5 days a week). This will help you control your weight and prevent disease.    Limit alcohol to one drink per day.    No smoking.     Wear sunscreen to prevent skin cancer.     See your dentist every six months for an exam and cleaning.    See your eye doctor every 1 to 2 years.

## 2021-09-28 NOTE — PROGRESS NOTES
SUBJECTIVE:   CC: Laya Zuleta is an 51 year old woman who presents for preventive health visit.     Patient has been advised of split billing requirements and indicates understanding: Yes  Healthy Habits:     Getting at least 3 servings of Calcium per day:  NO    Bi-annual eye exam:  Yes    Dental care twice a year:  Yes    Sleep apnea or symptoms of sleep apnea:  None    Diet:  Regular (no restrictions)    Frequency of exercise:  2-3 days/week    Duration of exercise:  30-45 minutes    Taking medications regularly:  No    Medication side effects:  None    PHQ-2 Total Score: 0    Additional concerns today:  No    Concerns today: none  NOT fasting today    -------------------------------------    Today's PHQ-2 Score:   PHQ-2 ( 1999 [employer]) 9/28/2021   Q1: Little interest or pleasure in doing things 0   Q2: Feeling down, depressed or hopeless 0   PHQ-2 Score 0   Q1: Little interest or pleasure in doing things Not at all   Q2: Feeling down, depressed or hopeless Not at all   PHQ-2 Score 0     Abuse: Current or Past (Physical, Sexual or Emotional) - No  Do you feel safe in your environment? Yes    Have you ever done Advance Care Planning? (For example, a Health Directive, POLST, or a discussion with a medical provider or your loved ones about your wishes): Yes, patient states has an Advance Care Planning document and will bring a copy to the clinic.    Social History     Tobacco Use     Smoking status: Never Smoker     Smokeless tobacco: Never Used   Substance Use Topics     Alcohol use: Yes     Comment: 2 times per week, 2 per time     Alcohol Use 9/28/2021   Prescreen: >3 drinks/day or >7 drinks/week? No   Prescreen: >3 drinks/day or >7 drinks/week? -     Reviewed orders with patient.  Reviewed health maintenance and updated orders accordingly - Yes  Lab work is in process    Breast Cancer Screening:    FHS-7:   Breast CA Risk Assessment (FHS-7) 9/28/2021   Did any of your first-degree relatives have breast  or ovarian cancer? Unknown   Did any of your relatives have bilateral breast cancer? Unknown   Did any man in your family have breast cancer? No   Did any woman in your family have breast and ovarian cancer? Unknown   Did any woman in your family have breast cancer before age 50 y? No   Do you have 2 or more relatives with breast and/or ovarian cancer? No   Do you have 2 or more relatives with breast and/or bowel cancer? No     click delete button to remove this line now  Mammogram Screening: Recommended annual mammography  Pertinent mammograms are reviewed under the imaging tab.    History of abnormal Pap smear: NO - age 30-65 PAP every 5 years with negative HPV co-testing recommended  PAP / HPV Latest Ref Rng & Units 7/13/2017 6/12/2014   PAP (Historical) - NIL NIL   HPV16 NEG Negative -   HPV18 NEG Negative -   HRHPV NEG Negative -     Reviewed and updated as needed this visit by clinical staff  Tobacco  Allergies    Med Hx  Surg Hx  Fam Hx  Soc Hx        Reviewed and updated as needed this visit by Provider                    Review of Systems   Constitutional: Negative for chills and fever.   HENT: Negative for congestion, ear pain, hearing loss and sore throat.    Eyes: Negative for pain and visual disturbance.   Respiratory: Negative for cough and shortness of breath.    Cardiovascular: Negative for chest pain, palpitations and peripheral edema.   Gastrointestinal: Negative for abdominal pain, constipation, diarrhea, heartburn, hematochezia and nausea.   Breasts:  Negative for tenderness, breast mass and discharge.   Genitourinary: Negative for dysuria, frequency, genital sores, hematuria, pelvic pain, urgency, vaginal bleeding and vaginal discharge.   Musculoskeletal: Negative for arthralgias, joint swelling and myalgias.   Skin: Negative for rash.   Neurological: Negative for dizziness, weakness, headaches and paresthesias.   Psychiatric/Behavioral: Negative for mood changes. The patient is not  "nervous/anxious.      OBJECTIVE:   /86 (BP Location: Right arm, Cuff Size: Adult Large)   Pulse 79   Temp 98.1  F (36.7  C) (Tympanic)   Resp 14   Ht 1.715 m (5' 7.5\")   Wt 86.8 kg (191 lb 4.8 oz)   LMP 09/15/2015 (LMP Unknown)   SpO2 99%   BMI 29.52 kg/m    Physical Exam  GENERAL APPEARANCE: healthy, alert and no distress  EYES: Eyes grossly normal to inspection, PERRL and conjunctivae and sclerae normal  HENT: ear canals and TM's normal, nose and mouth without ulcers or lesions, oropharynx clear and oral mucous membranes moist  NECK: no adenopathy, no asymmetry, masses, or scars and thyroid normal to palpation  RESP: lungs clear to auscultation - no rales, rhonchi or wheezes  CV: regular rate and rhythm, normal S1 S2, no S3 or S4, no murmur, click or rub, no peripheral edema and peripheral pulses strong  ABDOMEN: soft, nontender, no hepatosplenomegaly, no masses and bowel sounds normal  MS: no musculoskeletal defects are noted and gait is age appropriate without ataxia  SKIN: no suspicious lesions or rashes  NEURO: Normal strength and tone, sensory exam grossly normal, mentation intact and speech normal  PSYCH: mentation appears normal and affect normal/bright    Diagnostic Test Results:  Labs reviewed in Epic    ASSESSMENT/PLAN:   (Z00.00) Routine general medical examination at a health care facility  (primary encounter diagnosis)  Plan: GLUCOSE, REVIEW OF HEALTH MAINTENANCE PROTOCOL         ORDERS, MA SCREENING DIGITAL BILAT - Future          (s+30), [place] RIV4 (FLUBLOK) VACCINE RECOMBINANT         DNA PRSRV ANTIBIO FREE, IM ([id number]), Lipid         panel reflex to direct LDL Fasting    (F32.81) PMDD (premenstrual dysphoric disorder)  Comment: Wishes to continue for anxiety  Plan: citalopram (CELEXA) 10 MG tablet, buPROPion         (WELLBUTRIN XL) 150 MG 24 hr tablet            (Z68.29) BMI 29.0-29.9,adult  Comment: Asked her to follow-up with weight clinic. Taking phentermine intermittently due to " "insomnia. Wishes to re-start topamax. Will start with 25 then follow-up with weight clinic.    Patient has been advised of split billing requirements and indicates understanding: Yes  COUNSELING:  Reviewed preventive health counseling, as reflected in patient instructions    Estimated body mass index is 29.52 kg/m  as calculated from the following:    Height as of this encounter: 1.715 m (5' 7.5\").    Weight as of this encounter: 86.8 kg (191 lb 4.8 oz).    Weight management plan: Discussed healthy diet and exercise guidelines    She reports that she has never smoked. She has never used smokeless tobacco.      Counseling Resources:  ATP IV Guidelines  Pooled Cohorts Equation Calculator  Breast Cancer Risk Calculator  BRCA-Related Cancer Risk Assessment: FHS-7 Tool  FRAX Risk Assessment  ICSI Preventive Guidelines  Dietary Guidelines for Americans, 2010  USDA's MyPlate  ASA Prophylaxis  Lung CA Screening    Suzanna Smith, FLORENCE CNP  [facility] JEANETTE  "

## 2021-10-12 ENCOUNTER — LAB (OUTPATIENT)
Dept: LAB | Facility: CLINIC | Age: 51
End: 2021-10-12
Payer: COMMERCIAL

## 2021-10-12 DIAGNOSIS — Z00.00 ROUTINE GENERAL MEDICAL EXAMINATION AT A HEALTH CARE FACILITY: ICD-10-CM

## 2021-10-12 PROBLEM — R73.01 IMPAIRED FASTING GLUCOSE: Status: ACTIVE | Noted: 2021-10-12

## 2021-10-12 LAB
CHOLEST SERPL-MCNC: 245 MG/DL
FASTING STATUS PATIENT QL REPORTED: YES
FASTING STATUS PATIENT QL REPORTED: YES
GLUCOSE BLD-MCNC: 102 MG/DL (ref 70–99)
HDLC SERPL-MCNC: 43 MG/DL
LDLC SERPL CALC-MCNC: 160 MG/DL
NONHDLC SERPL-MCNC: 202 MG/DL
TRIGL SERPL-MCNC: 210 MG/DL

## 2021-10-12 PROCEDURE — 80061 LIPID PANEL: CPT

## 2021-10-12 PROCEDURE — 36415 COLL VENOUS BLD VENIPUNCTURE: CPT

## 2021-10-12 PROCEDURE — 82947 ASSAY GLUCOSE BLOOD QUANT: CPT

## 2021-10-15 ENCOUNTER — ANCILLARY PROCEDURE (OUTPATIENT)
Dept: MAMMOGRAPHY | Facility: CLINIC | Age: 51
End: 2021-10-15
Payer: COMMERCIAL

## 2021-10-15 DIAGNOSIS — Z00.00 ROUTINE GENERAL MEDICAL EXAMINATION AT A HEALTH CARE FACILITY: ICD-10-CM

## 2021-10-15 PROCEDURE — 77063 BREAST TOMOSYNTHESIS BI: CPT | Mod: TC | Performed by: RADIOLOGY

## 2021-10-15 PROCEDURE — 77067 SCR MAMMO BI INCL CAD: CPT | Mod: TC | Performed by: RADIOLOGY

## 2021-10-20 ENCOUNTER — MYC MEDICAL ADVICE (OUTPATIENT)
Dept: PEDIATRICS | Facility: CLINIC | Age: 51
End: 2021-10-20

## 2021-10-20 DIAGNOSIS — R73.01 IMPAIRED FASTING GLUCOSE: Primary | ICD-10-CM

## 2021-10-20 NOTE — TELEPHONE ENCOUNTER
Any idea what this order would be? Please see MC message. I tried looking at a few things. Is it diabetes ed? Deb Snowden RN on 10/20/2021 at 12:07 PM

## 2021-10-25 ENCOUNTER — TELEPHONE (OUTPATIENT)
Dept: PEDIATRICS | Facility: CLINIC | Age: 51
End: 2021-10-25
Payer: COMMERCIAL

## 2021-10-26 ENCOUNTER — TELEPHONE (OUTPATIENT)
Dept: PEDIATRICS | Facility: CLINIC | Age: 51
End: 2021-10-26
Payer: COMMERCIAL

## 2021-10-29 ENCOUNTER — TELEPHONE (OUTPATIENT)
Dept: EDUCATION SERVICES | Facility: CLINIC | Age: 51
End: 2021-10-29

## 2021-10-29 DIAGNOSIS — R73.01 IMPAIRED FASTING GLUCOSE: Primary | ICD-10-CM

## 2021-10-29 NOTE — TELEPHONE ENCOUNTER
Wow that was fast!    Absolutely! Sending scheduling a message now. Thanks so much!!    Ariela Ruelas RN, [facility]

## 2021-10-29 NOTE — TELEPHONE ENCOUNTER
Dr. Smith,    As a nurse I can only see patients with a dx of diabetes, so I will be cancelling her appt with me in November. Dietitians on our team can see patients for pre-diabetes and impaired fasting glucose, but they need to have a nutrition referral placed instead of diabetes education. If you place a nutrition referral for patient then our schedulers can reach out to get her rescheduled with a dietitian.    Thanks so much!    Ariela Ruelas RN, [facility]

## 2021-11-08 ENCOUNTER — VIRTUAL VISIT (OUTPATIENT)
Dept: SURGERY | Facility: CLINIC | Age: 51
End: 2021-11-08
Payer: COMMERCIAL

## 2021-11-08 VITALS — HEIGHT: 68 IN | WEIGHT: 188 LBS | BODY MASS INDEX: 28.49 KG/M2

## 2021-11-08 DIAGNOSIS — R73.01 IMPAIRED FASTING GLUCOSE: ICD-10-CM

## 2021-11-08 DIAGNOSIS — E66.3 OVERWEIGHT (BMI 25.0-29.9): Primary | ICD-10-CM

## 2021-11-08 PROCEDURE — 99215 OFFICE O/P EST HI 40 MIN: CPT | Mod: 95 | Performed by: FAMILY MEDICINE

## 2021-11-08 RX ORDER — PHENTERMINE HYDROCHLORIDE 15 MG/1
15 CAPSULE ORAL EVERY MORNING
Qty: 90 CAPSULE | Refills: 0 | Status: SHIPPED | OUTPATIENT
Start: 2021-11-08 | End: 2022-04-12

## 2021-11-08 RX ORDER — METFORMIN HCL 500 MG
TABLET, EXTENDED RELEASE 24 HR ORAL
Qty: 90 TABLET | Refills: 1 | Status: SHIPPED | OUTPATIENT
Start: 2021-11-08 | End: 2022-04-12

## 2021-11-08 ASSESSMENT — MIFFLIN-ST. JEOR: SCORE: 1503.32

## 2021-11-08 NOTE — PATIENT INSTRUCTIONS
Ish Asif,  It was nice meeting with you today. Please call [phone] to set up a follow up appointment with me in 3 months.  Melyssa Bahena    Eat Better ? Move More ? Live Well    Eat 3 nutrient-rich meals each day     Don t skip meals--it will cause you to overeat later in the day!     Eating fiber (vegetables/fruits/whole grains) and protein with meals helps you stay full longer     Choose foods with less than 10 grams of sugar and 5 grams of fat per serving to prevent excess calories and weight re-gain   Eat around the same times each day to develop a routine eating schedule    Avoid snacking unless physically hungry.   Planned snacks: 1-2 times per day and no more than 150 calories    Eat protein first    Protein helps with healing, maintaining adequate muscle mass, reducing hunger and optimizing nutritional status    Aim for 60-80 grams of protein per day   Fill up on Fiber    Fiber comes from plants--fruits, veggies, whole grains, nuts/seeds and beans    Fiber is low in calories, high in phytonutrients and helps you stay full longer    Aim for 25-35 grams per day by eating fiber with meals and snacks  Eat S-[id number]    Take 20-30 minutes to eat each meal by taking small bites, chewing foods to applesauce consistency or 20-30 times before you swallow    Eating foods too fast can delay satiety/fullness signals and increase overeating   ? Slow down your eating by using toddler utensils, putting your fork/spoon down between bites and not watching TV or emailing during meals!   Keep a Journal          Writing down what you eat, how you feel and when you are active helps you identify new changes to work on from week to week          Look for ways to cut 100 calories from your current diet 2-3 times per day  Drink 64 ounces of 0-Calorie drinks between meals    Water    Zero calorie Propel  or Vitamin Water      SoBe Lifewater  Zero Calories    Crystal Light , Sugar-Free Domo-Aid , and other sugar-free lemonade  or flavored carson    Keep Caffeine to less than 300mg per day ie: 3-6oz cups coffee     Work up to 45-60 minutes of physical activity most days of the week    Helps with losing weight and prevent regaining those extra pounds!     Do a combo of cardio (walking/water exercises) and strength training (lifting weights/Vinyasa yoga)    Avoid Mindless Eating    Be present when you eat--take note of the smell, taste and quality of your food    Make a list of alternative activities you could do to prevent eating out of boredom/stress  ? Go for a walk, call a friend, chew gum, paint your nails, re-organize the garage, etc

## 2021-11-08 NOTE — PROGRESS NOTES
Laya is a 51 year old who is being evaluated via a billable video visit.      If the video visit is dropped, the invitation should be resent by: Text to cell phone: [phone]  Will anyone else be joining your video visit? No      Video-Visit Details    Type of service:  Video Visit    Video Start Time: 1:00 pm    Video End Time:1:23 PM     Bariat[facility] Care Clinic Non Surgical Follow up Visit   Date of visit: 11/8/2021  Physician: EFRAIN Bahena MD, MD  Primary Care is Suzanna Smith.  Laya Hindsolph   51 year old  female     Initial Weight: 193#    Today's Weight:   Wt Readings from Last 1 Encounters:   11/08/21 188 lb (85.3 kg)     Body mass index is 29.01 kg/m .           Assessment and Plan   Assessment: Laya is a 51 year old year old female who presents for medical weight management.      Plan:    1. Overweight (BMI 25.0-29.9)  Patient was congratulated on her success thus far. Healthy habits to assist with further weight loss were discussed. She will work on increasing her water intake and her exercise, She will try to decrease snacking on sweets She will continue the phentermine but we will try decreasing the dose to 15 mg.    2. Impaired fasting glucose  .This may improve with healthy habits and weight loss. We discussed insulin resistance and how it can interfere with weight loss efforts. We discussed dietary changes and exercise to reduce insulin resistance. We discussed the use of metformin. Patient had her questions answered. It was sent to her pharmacy    Follow up in 3 months with myself           INTERIM HISTORY  Patient was instructed to try cutting back to 1/2 tab of phentermine at her last visit in April. She has trouble sleeping unless she takes melatonin. Topamax was also added at that time. She didn't think it helped so she stopped.     DIETARY HISTORY  Meals Per Day: 2-3  Eating Protein First?: usually  Food Diary: B:Naseem Bergeron sandwich L:cottage cheese, nuts,  "zucchini (snacks) D:salad, chicken and veggies, chilli  Snacks Per Day: lunch, after dinner  Typical Snack: ice cream sandwich, chocolate covered raspberries  Fluid Intake: not enough, trying  Portion Control: yes  Calorie Containing Beverages: wine  Meals at Restaurant per week:2 plus    Positive Changes Since Last Visit: protein first, portion control  Struggling With: drinking water, exercise, snacking    Knowledgeable in Reading Food Labels: yes  Getting Adequate Protein: usually  Sleeping 7-8 hours/day yes      PHYSICAL ACTIVITY PATTERNS:  Some walking, 3-4 days per week    REVIEW OF SYSTEMS  GENERAL/CONSTITUTIONAL:  Fatigue: sometimes  HEENT:  Vision changes, glaucoma: no  CARDIOVASCULAR:  Chest Pain with Exertion: no  PSYCHIATRIC:  Moods: stable  ENDOCRINE:  Monitoring Blood Sugars: na  Sugars Well Controlled: na  No personal or family history of medullary thyroid cancer not discussed       Patient Profile   Social History     Social History Narrative     Not on file        Past Medical History   Past Medical History:   Diagnosis Date     Acne 11/29/2010     CARDIOVASCULAR SCREENING; LDL GOAL LESS THAN 160 11/29/2010     Patient Active Problem List   Diagnosis     Chronic maxillary sinusitis     Acne     Family history of breast cancer     Overweight (BMI 25.0-29.9)     Premenstrual dysphoric disorder     Impaired fasting glucose       Past Surgical History  She has a past surgical history that includes sinus surgery; Laparoscopic appendectomy child; Colonoscopy (N/A, 11/19/2018); and APPENDECTOMY.     Examination   Ht 5' 7.5\" (1.715 m)   Wt 188 lb (85.3 kg)   LMP 09/15/2015 (LMP Unknown)   BMI 29.01 kg/m    GENERAL: Healthy, alert and no distress  EYES: Eyes grossly normal to inspection.  No discharge or erythema, or obvious scleral/conjunctival abnormalities.  RESP: No audible wheeze, cough, or visible cyanosis.  No visible retractions or increased work of breathing.    SKIN: Visible skin clear. No " significant rash, abnormal pigmentation or lesions.  NEURO: Cranial nerves grossly intact.  Mentation and speech appropriate for age.  PSYCH: Mentation appears normal, affect normal/bright, judgement and insight intact, normal speech and appearance well-groomed.     Counseling:   We reviewed the important post op bariatric recommendations:  -eating 3 meals daily  -eating protein first, getting >60gm protein daily  -eating slowly, chewing food well  -avoiding/limiting calorie containing beverages  -limiting starchy vegetables and carbohydrates, choosing wheat, not white with breads,   crackers, pastas, josue, bagels, tortillas, rice  -limiting restaurant or cafeteria eating to twice a week or less    We discussed the importance of restorative sleep and stress management in maintaining a healthy weight.  We discussed the National Weight Control Registry healthy weight maintenance strategies and ways to optimize metabolism.  We discussed the importance of physical activity including cardiovascular and strength training in maintaining a healthier weight.    Total time spent on the date of this encounter doing: chart review, review of test results, patient visit, physical exam, education, counseling, developing plan of care and documenting = 44 minutes.         EFRAIN Bahena MD  [facility]               Originating Location (pt. Location): Home    Distant Location (provider location):  [facility] [place]     Platform used for Video Visit: Blue

## 2021-11-15 ENCOUNTER — VIRTUAL VISIT (OUTPATIENT)
Dept: EDUCATION SERVICES | Facility: CLINIC | Age: 51
End: 2021-11-15
Payer: COMMERCIAL

## 2021-11-15 DIAGNOSIS — R73.01 IMPAIRED FASTING GLUCOSE: Primary | ICD-10-CM

## 2021-11-15 PROCEDURE — 97802 MEDICAL NUTRITION INDIV IN: CPT | Performed by: DIETITIAN, REGISTERED

## 2021-11-15 NOTE — PROGRESS NOTES
Medical Nutrition Therapy  Visit Type:Initial assessment and intervention    Laya Zuleta presents today for MNT and education related to prediabetes.   She is accompanied by self.     ASSESSMENT:   Patient comments/concerns relating to nutrition: tried weight watchers, nutrisystem, has also been counting calories. She was referred to the weight management clinic, they prescribed phentermine but it causes insomnia so she hasn't been taking it. Then was prescribed Metformin, which she has not taken it yet.     NUTRITION HISTORY:  Breakfast: coffee with sugar free vanilla creamer, starts work by 7 am, eats at 9 am: Naseemjeremy Leonardon turkey breakfast sandwich with a extra slice of cheese and siracha sauce  Snack: 1 protein waffle with peanut butter and sugar free syrup  Lunch: hard boiled egg, cucumber, hummus, corn puff (from  joes)  Dinner: wild rice chicken soup (not very creamy, uses 1% milk)   Snacks: cupcakes, has a sweet tooth  Beverages: water, sometimes carlita in water, alcohol - wine on weekends    Misses meals? Not really, might snack throughout the day   Eats out:  2 meals/per week     Previous diet education:  Yes     Food allergies/intolerances: doesn't love meat (hasn't had beef for 30 years and doesn't eat pork)    EXERCISE: walk with dog, 30-45 minutes 3 times/week, sometimes 30 minute yoga, would like to add some weight training- thinking about joining [employer] again to do the classes.     SOCIO/ECONOMIC:   Lives with: self, spouse and daughter    MEDICATIONS:  Current Outpatient Medications   Medication     buPROPion (WELLBUTRIN XL) 150 MG 24 hr tablet     Calcium Carbonate-Vitamin D (CALCIUM 600 + D) 600-400 MG-UNIT tablet     citalopram (CELEXA) 10 MG tablet     metFORMIN (GLUCOPHAGE-XR) 500 MG 24 hr tablet     phentermine (ADIPEX-P) 15 MG capsule     phentermine (ADIPEX-P) 37.5 MG tablet     SPIRONOLACTONE PO     topiramate (TOPAMAX) 25 MG capsule     No current  facility-administered medications for this visit.       LABS:  Last Basic Metabolic Panel:  No results found for: NA   Lab Results   Component Value Date    POTASSIUM 4.2 09/29/2020     No results found for: CHLORIDE  No results found for: KALA  No results found for: CO2  No results found for: BUN  Lab Results   Component Value Date    CR 0.82 09/29/2020     Lab Results   Component Value Date     10/12/2021    GLC 94 09/29/2020       ANTHROPOMETRICS:  Vitals: LMP 09/15/2015 (LMP Unknown)   There is no height or weight on file to calculate BMI.      Wt Readings from Last 5 Encounters:   11/08/21 85.3 kg (188 lb)   09/28/21 86.8 kg (191 lb 4.8 oz)   04/05/21 83.5 kg (184 lb)   03/15/21 83 kg (183 lb)   01/04/21 83.5 kg (184 lb)       Weight Change: gained 20 pounds    NUTRITION DIAGNOSIS: Food- and nutrition-related knowledge deficit related to limited previous education as evidenced by patient statements and new referral.    NUTRITION INTERVENTION:  Education given to support: general nutrition guidelines, weight reduction, consistent meals, carb counting, labeling, fat modification, exercise, fiber, behavior modification and portion control  Education Materials Provided: My Plate Planner/Choose My Plate, Carbohydrate Counting and Preventing type 2 diabetes  Motivational Interviewing    PATIENT'S BEHAVIOR CHANGE GOALS:   See Patient Instructions for patient stated behavior change goals. AVS was printed and given to patient at today's appointment.    Recommended 45 grams of fiber rich carbohydrates at each meal  Add in some strength training (think about re-joining gym)    MONITOR / EVALUATE:  RD will monitor/evaluate:  Pertinent Labs  Progress toward meeting stated nutrition-related goals  Readiness to change nutrition-related behaviors  Weight change    FOLLOW-UP:  Call RD with questions/concerns.   Follow-up appointment scheduled.     Khushboo Yoon, RD, LD, CDE  Time spent in minutes: 30  Encounter:  Individual

## 2021-11-15 NOTE — LETTER
11/15/2021         RE: Laya Zuleta  [address] [address]        Dear Colleague,    Thank you for referring your patient, Laya Zuleta, to the [facility]. Please see a copy of my visit note below.    Medical Nutrition Therapy  Visit Type:Initial assessment and intervention    Laya Zuleta presents today for MNT and education related to prediabetes.   She is accompanied by self.     ASSESSMENT:   Patient comments/concerns relating to nutrition: tried weight watchers, nutrisystem, has also been counting calories. She was referred to the weight management clinic, they prescribed phentermine but it causes insomnia so she hasn't been taking it. Then was prescribed Metformin, which she has not taken it yet.     NUTRITION HISTORY:  Breakfast: coffee with sugar free vanilla creamer, starts work by 7 am, eats at 9 am: Naseem Pat turkey breakfast sandwich with a extra slice of cheese and siracha sauce  Snack: 1 protein waffle with peanut butter and sugar free syrup  Lunch: hard boiled egg, cucumber, hummus, corn puff (from  joes)  Dinner: wild rice chicken soup (not very creamy, uses 1% milk)   Snacks: cupcakes, has a sweet tooth  Beverages: water, sometimes carlita in water, alcohol - wine on weekends    Misses meals? Not really, might snack throughout the day   Eats out:  2 meals/per week     Previous diet education:  Yes     Food allergies/intolerances: doesn't love meat (hasn't had beef for 30 years and doesn't eat pork)    EXERCISE: walk with dog, 30-45 minutes 3 times/week, sometimes 30 minute yoga, would like to add some weight training- thinking about joining [employer] again to do the classes.     SOCIO/ECONOMIC:   Lives with: self, spouse and daughter    MEDICATIONS:  Current Outpatient Medications   Medication     buPROPion (WELLBUTRIN XL) 150 MG 24 hr tablet     Calcium Carbonate-Vitamin D (CALCIUM 600 + D) 600-400 MG-UNIT tablet      citalopram (CELEXA) 10 MG tablet     metFORMIN (GLUCOPHAGE-XR) 500 MG 24 hr tablet     phentermine (ADIPEX-P) 15 MG capsule     phentermine (ADIPEX-P) 37.5 MG tablet     SPIRONOLACTONE PO     topiramate (TOPAMAX) 25 MG capsule     No current facility-administered medications for this visit.       LABS:  Last Basic Metabolic Panel:  No results found for: NA   Lab Results   Component Value Date    POTASSIUM 4.2 09/29/2020     No results found for: CHLORIDE  No results found for: KALA  No results found for: CO2  No results found for: BUN  Lab Results   Component Value Date    CR 0.82 09/29/2020     Lab Results   Component Value Date     10/12/2021    GLC 94 09/29/2020       ANTHROPOMETRICS:  Vitals: LMP 09/15/2015 (LMP Unknown)   There is no height or weight on file to calculate BMI.      Wt Readings from Last 5 Encounters:   11/08/21 85.3 kg (188 lb)   09/28/21 86.8 kg (191 lb 4.8 oz)   04/05/21 83.5 kg (184 lb)   03/15/21 83 kg (183 lb)   01/04/21 83.5 kg (184 lb)       Weight Change: gained 20 pounds    NUTRITION DIAGNOSIS: Food- and nutrition-related knowledge deficit related to limited previous education as evidenced by patient statements and new referral.    NUTRITION INTERVENTION:  Education given to support: general nutrition guidelines, weight reduction, consistent meals, carb counting, labeling, fat modification, exercise, fiber, behavior modification and portion control  Education Materials Provided: My Plate Planner/Choose My Plate, Carbohydrate Counting and Preventing type 2 diabetes  Motivational Interviewing    PATIENT'S BEHAVIOR CHANGE GOALS:   See Patient Instructions for patient stated behavior change goals. AVS was printed and given to patient at today's appointment.    Recommended 45 grams of fiber rich carbohydrates at each meal  Add in some strength training (think about re-joining gym)    MONITOR / EVALUATE:  RD will monitor/evaluate:  Pertinent Labs  Progress toward meeting stated  nutrition-related goals  Readiness to change nutrition-related behaviors  Weight change    FOLLOW-UP:  Call RD with questions/concerns.   Follow-up appointment scheduled.     Khushboo Yoon RD, LD, CDE  Time spent in minutes: 30  Encounter: Individual

## 2021-12-01 NOTE — TELEPHONE ENCOUNTER
Diabetes Education Scheduling Outreach #2:    Call to patient to schedule. Left message with phone number to call to schedule.      Nika Amaya  Elsie OnCall  Diabetes and Nutrition Scheduling       Date Of Previous Biopsy (Optional): 10/27/2021

## 2021-12-28 ENCOUNTER — E-VISIT (OUTPATIENT)
Dept: URGENT CARE | Facility: URGENT CARE | Age: 51
End: 2021-12-28
Payer: COMMERCIAL

## 2021-12-28 DIAGNOSIS — B96.89 ACUTE BACTERIAL SINUSITIS: Primary | ICD-10-CM

## 2021-12-28 DIAGNOSIS — J01.90 ACUTE BACTERIAL SINUSITIS: Primary | ICD-10-CM

## 2021-12-28 PROCEDURE — 99421 OL DIG E/M SVC 5-10 MIN: CPT | Performed by: PHYSICIAN ASSISTANT

## 2021-12-28 NOTE — PATIENT INSTRUCTIONS
Sinusitis (Antibiotic Treatment)    The sinuses are air-filled spaces within the bones of the face. They connect to the inside of the nose. Sinusitis is an inflammation of the tissue that lines the sinuses. Sinusitis can occur during a cold. It can also happen due to allergies to pollens and other particles in the air. Sinusitis can cause symptoms of sinus congestion and a feeling of fullness. A sinus infection causes fever, headache, and facial pain. There is often green or yellow fluid draining from the nose or into the back of the throat (post-nasal drip). You have been given antibiotics to treat this condition.   Home care    Take the full course of antibiotics as instructed. Don't stop taking them, even when you feel better.    Drink plenty of water, hot tea, and other liquids as directed by the healthcare provider. This may help thin nasal mucus. It also may help your sinuses drain fluids.    Heat may help soothe painful areas of your face. Use a towel soaked in hot water. Or,  the shower and direct the warm spray onto your face. Using a vaporizer along with a menthol rub at night may also help soothe symptoms.     An expectorant with guaifenesin may help thin nasal mucus and help your sinuses drain fluids. Talk with your provider or pharmacists before taking an over-the-counter (OTC) medicine if you have any questions about it or its side effects..    You can use an OTC decongestant, unless a similar medicine was prescribed to you. Nasal sprays work the fastest. Use one that contains phenylephrine or oxymetazoline. First blow your nose gently. Then use the spray. Don't use these medicines more often than directed on the label. If you do, your symptoms may get worse. You may also take pills that contain pseudoephedrine. Don t use products that combine multiple medicines. This is because side effects may be increased. Read labels. You can also ask the pharmacist for help. (People with high blood  pressure should not use decongestants. They can raise blood pressure.) Talk with your provider or pharmacist if you have any questions about the medicine..    OTC antihistamines may help if allergies contributed to your sinusitis. Talk with your provider or pharmacist if you have any questions about the medicine..    Don't use nasal rinses or irrigation during an acute sinus infection, unless your healthcare provider tells you to. Rinsing may spread the infection to other areas in your sinuses.    Use acetaminophen or ibuprofen to control pain, unless another pain medicine was prescribed to you. If you have chronic liver or kidney disease or ever had a stomach ulcer, talk with your healthcare provider before using these medicines. Never give aspirin to anyone under age 18 who is ill with a fever. It may cause severe liver damage.    Don't smoke. This can make symptoms worse.    Follow-up care  Follow up with your healthcare provider, or as advised.   When to seek medical advice  Call your healthcare provider if any of these occur:     Facial pain or headache that gets worse    Stiff neck    Unusual drowsiness or confusion    Swelling of your forehead or eyelids    Symptoms don't go away in 10 days    Vision problems, such as blurred or double vision    Fever of 100.4 F (38 C) or higher, or as directed by your healthcare provider  Call 911  Call 911 if any of these occur:     Seizure    Trouble breathing    Feeling dizzy or faint    Fingernails, skin or lips look blue, purple , or gray  Prevention  Here are steps you can take to help prevent an infection:     Keep good hand washing habits.    Don t have close contact with people who have sore throats, colds, or other upper respiratory infections.    Don t smoke, and stay away from secondhand smoke.    Stay up to date with of your vaccines.  [employer] last reviewed this educational content on 12/1/2019 2000-2021 [employer]. All rights reserved. This  information is not intended as a substitute for professional medical care. Always follow your healthcare professional's instructions.        Dear Laya Zuleta    After reviewing your responses, I've been able to diagnose you with?a sinus infection caused by bacteria.?     Based on your responses and diagnosis, I have prescribed augmentin to treat your symptoms. I have sent this to your pharmacy.?     It is also important to stay well hydrated, get lots of rest and take over-the-counter decongestants,?tylenol?or ibuprofen if you?are able to?take those medications per your primary care provider to help relieve discomfort.?     It is important that you take?all of?your prescribed medication even if your symptoms are improving after a few doses.? Taking?all of?your medicine helps prevent the symptoms from returning.?     If your symptoms worsen, you develop severe headache, vomiting, high fever (>102), or are not improving in 7 days, please contact your primary care provider for an appointment or visit any of our convenient Walk-in Care or Urgent Care Centers to be seen which can be found on our website?here.?     Thanks again for choosing?us?as your health care partner,?   ?  Mayelin Quinones?

## 2022-02-22 ENCOUNTER — OFFICE VISIT (OUTPATIENT)
Dept: PEDIATRICS | Facility: CLINIC | Age: 52
End: 2022-02-22
Payer: COMMERCIAL

## 2022-02-22 VITALS
DIASTOLIC BLOOD PRESSURE: 82 MMHG | WEIGHT: 191.7 LBS | HEART RATE: 85 BPM | SYSTOLIC BLOOD PRESSURE: 124 MMHG | HEIGHT: 68 IN | OXYGEN SATURATION: 97 % | BODY MASS INDEX: 29.05 KG/M2 | TEMPERATURE: 97.7 F | RESPIRATION RATE: 16 BRPM

## 2022-02-22 DIAGNOSIS — N95.0 POSTMENOPAUSAL BLEEDING: Primary | ICD-10-CM

## 2022-02-22 DIAGNOSIS — Z12.4 CERVICAL CANCER SCREENING: ICD-10-CM

## 2022-02-22 LAB
BASOPHILS # BLD AUTO: 0.1 10E3/UL (ref 0–0.2)
BASOPHILS NFR BLD AUTO: 2 %
EOSINOPHIL # BLD AUTO: 0.2 10E3/UL (ref 0–0.7)
EOSINOPHIL NFR BLD AUTO: 3 %
ERYTHROCYTE [DISTWIDTH] IN BLOOD BY AUTOMATED COUNT: 13 % (ref 10–15)
HCT VFR BLD AUTO: 43.9 % (ref 35–47)
HGB BLD-MCNC: 14.5 G/DL (ref 11.7–15.7)
LYMPHOCYTES # BLD AUTO: 1.5 10E3/UL (ref 0.8–5.3)
LYMPHOCYTES NFR BLD AUTO: 27 %
MCH RBC QN AUTO: 29.7 PG (ref 26.5–33)
MCHC RBC AUTO-ENTMCNC: 33 G/DL (ref 31.5–36.5)
MCV RBC AUTO: 90 FL (ref 78–100)
MONOCYTES # BLD AUTO: 0.5 10E3/UL (ref 0–1.3)
MONOCYTES NFR BLD AUTO: 10 %
NEUTROPHILS # BLD AUTO: 3.2 10E3/UL (ref 1.6–8.3)
NEUTROPHILS NFR BLD AUTO: 58 %
PLATELET # BLD AUTO: 323 10E3/UL (ref 150–450)
RBC # BLD AUTO: 4.88 10E6/UL (ref 3.8–5.2)
WBC # BLD AUTO: 5.5 10E3/UL (ref 4–11)

## 2022-02-22 PROCEDURE — 87624 HPV HI-RISK TYP POOLED RSLT: CPT | Performed by: NURSE PRACTITIONER

## 2022-02-22 PROCEDURE — 36415 COLL VENOUS BLD VENIPUNCTURE: CPT | Performed by: NURSE PRACTITIONER

## 2022-02-22 PROCEDURE — 99214 OFFICE O/P EST MOD 30 MIN: CPT | Performed by: NURSE PRACTITIONER

## 2022-02-22 PROCEDURE — 84443 ASSAY THYROID STIM HORMONE: CPT | Performed by: NURSE PRACTITIONER

## 2022-02-22 PROCEDURE — 85025 COMPLETE CBC W/AUTO DIFF WBC: CPT | Performed by: NURSE PRACTITIONER

## 2022-02-22 PROCEDURE — G0145 SCR C/V CYTO,THINLAYER,RESCR: HCPCS | Performed by: NURSE PRACTITIONER

## 2022-02-22 NOTE — PROGRESS NOTES
"  Assessment & Plan     (N95.0) Postmenopausal bleeding  (primary encounter diagnosis)  Comment: Postmenopausal x 4 years. Had a full period last week with some cramping. No FMH uterine CA.  Plan: CBC with platelets and differential, TSH with         free T4 reflex, US Pelvic Complete with         Transvaginal, Ob/Gyn Referral  -Labs, US, refer to OB for EMB  -Reviewed the possibility of endometrial CA and the need to follow-up to rule this out.     (Z12.4) Cervical cancer screening  Plan: Pap Screen with HPV - recommended age 30 - 65         years        No follow-ups on file.    Suzanna Smith, FLORENCE CNP  M [facility] JEANETTE Asif is a 51 year old who presents for the following health issues     HPI     Menstrual Concern  Onset/Duration: had a menstrual cycle last week, spotting the month before  Description:   Duration of bleeding episodes: 3 days    Describe bleeding/flow:   Clots: YES  Number of pads/day: 3        Cramping: mild  Accompanying Signs & Symptoms:  Lightheadedness: no  Temperature intolerance: no  Nosebleeds/Easy bruising: no  Vaginal Discharge: no  Acne: no  Change in body hair: no  History:  Patient's last menstrual period was 09/15/2015 (lmp unknown).  Previous normal periods: no , been in menopause for several years  Contraceptive use: NO  Sexually active: YES  Any bleeding after intercourse: no  Abnormal PAP Smears: YES- in her 20's  Precipitating or alleviating factors: None  Therapies tried and outcome: None    Occasionally uses delta 8 for sleep    Review of Systems   Constitutional, HEENT, cardiovascular, pulmonary, gi and gu systems are negative, except as otherwise noted.      Objective    /82 (BP Location: Right arm, Patient Position: Chair, Cuff Size: Adult Regular)   Pulse 85   Temp 97.7  F (36.5  C) (Tympanic)   Resp 16   Ht 1.715 m (5' 7.5\")   Wt 87 kg (191 lb 11.2 oz)   LMP 09/15/2015 (LMP Unknown)   SpO2 97%   BMI 29.58 kg/m  "   Body mass index is 29.58 kg/m .  Physical Exam    (female): normal female external genitalia, vaginal mucosa pink, moist, well rugated and normal cervix, adnexae, and uterus without masses. Normal vaginal discharge

## 2022-02-23 ENCOUNTER — OFFICE VISIT (OUTPATIENT)
Dept: URGENT CARE | Facility: URGENT CARE | Age: 52
End: 2022-02-23
Payer: COMMERCIAL

## 2022-02-23 ENCOUNTER — HOSPITAL ENCOUNTER (OUTPATIENT)
Dept: ULTRASOUND IMAGING | Facility: CLINIC | Age: 52
Discharge: HOME | End: 2022-02-23
Attending: NURSE PRACTITIONER | Admitting: NURSE PRACTITIONER
Payer: COMMERCIAL

## 2022-02-23 VITALS
HEART RATE: 80 BPM | TEMPERATURE: 98.3 F | BODY MASS INDEX: 29.4 KG/M2 | WEIGHT: 190.5 LBS | DIASTOLIC BLOOD PRESSURE: 88 MMHG | SYSTOLIC BLOOD PRESSURE: 132 MMHG | OXYGEN SATURATION: 98 %

## 2022-02-23 DIAGNOSIS — R07.0 THROAT PAIN: Primary | ICD-10-CM

## 2022-02-23 DIAGNOSIS — N95.0 POSTMENOPAUSAL BLEEDING: ICD-10-CM

## 2022-02-23 LAB
DEPRECATED S PYO AG THROAT QL EIA: NEGATIVE
GROUP A STREP BY PCR: NOT DETECTED
TSH SERPL DL<=0.005 MIU/L-ACNC: 1.67 MU/L (ref 0.4–4)

## 2022-02-23 PROCEDURE — 87651 STREP A DNA AMP PROBE: CPT | Performed by: FAMILY MEDICINE

## 2022-02-23 PROCEDURE — 99213 OFFICE O/P EST LOW 20 MIN: CPT | Performed by: FAMILY MEDICINE

## 2022-02-23 PROCEDURE — 76856 US EXAM PELVIC COMPLETE: CPT

## 2022-02-23 RX ORDER — DIPHENHYDRAMINE HYDROCHLORIDE AND LIDOCAINE HYDROCHLORIDE AND ALUMINUM HYDROXIDE AND MAGNESIUM HYDRO
10 KIT EVERY 6 HOURS PRN
Qty: 237 ML | Refills: 1 | Status: SHIPPED | OUTPATIENT
Start: 2022-02-23 | End: 2022-12-08

## 2022-02-23 NOTE — PROGRESS NOTES
SUBJECTIVE:   Laya Zuleta is a 51 year old female presenting with a chief complaint of sore throat.  No fever.  Onset of symptoms was 5 day(s) ago.  Course of illness is slightly improved.    Severity moderate  Current and Associated symptoms: sore throat  Treatment measures tried include: mucinex D, robitussin syrup, Fluids and Rest.  Predisposing factors include None.    Completed Pfizer COVID vaccinations in April, booster 11/16/2021    Home rapid test COVID negative X 3   No close positive COVID or strep exposure    Past Medical History:   Diagnosis Date     Acne 11/29/2010     CARDIOVASCULAR SCREENING; LDL GOAL LESS THAN 160 11/29/2010     Current Outpatient Medications   Medication Sig Dispense Refill     buPROPion (WELLBUTRIN XL) 150 MG 24 hr tablet Take 1 tablet (150 mg) by mouth every morning 180 tablet 3     Calcium Carbonate-Vitamin D (CALCIUM 600 + D) 600-400 MG-UNIT tablet Take 1 tablet by mouth 2 times daily. 100 tablet 3     citalopram (CELEXA) 10 MG tablet Take 1 tablet (10 mg) by mouth daily 90 tablet 3     SPIRONOLACTONE PO Take 75 mg by mouth       metFORMIN (GLUCOPHAGE-XR) 500 MG 24 hr tablet 1 tablet with dinner daily for 2 weeks then 2 tablets with dinner (Patient not taking: Reported on 2/23/2022) 90 tablet 1     phentermine (ADIPEX-P) 15 MG capsule Take 1 capsule (15 mg) by mouth every morning (Patient not taking: Reported on 2/23/2022) 90 capsule 0     phentermine (ADIPEX-P) 37.5 MG tablet 1 tablet with breakfast daily (Patient not taking: Reported on 2/23/2022) 90 tablet 0     topiramate (TOPAMAX) 25 MG capsule Take 25 mg by mouth 2 times daily (Patient not taking: Reported on 2/23/2022)       Social History     Tobacco Use     Smoking status: Never Smoker     Smokeless tobacco: Never Used   Substance Use Topics     Alcohol use: Yes     Comment: 2 times per week, 2 per time       ROS:  Review of systems negative except as stated above.    OBJECTIVE:  /88 (BP Location: Left  arm, Patient Position: Sitting, Cuff Size: Adult Regular)   Pulse 80   Temp 98.3  F (36.8  C)   Wt 86.4 kg (190 lb 8 oz)   LMP 09/15/2015 (LMP Unknown)   SpO2 98%   BMI 29.40 kg/m    GENERAL APPEARANCE: healthy, alert and no distress  EYES: EOMI,  PERRL, conjunctiva clear  HENT: Mouth without ulcers, erythema or lesions  RESP: lungs with no audible wheezes or increase work of breathing  PSYCH: mentation appears normal and affect normal/bright    Results for orders placed or performed in visit on 02/23/22   Streptococcus A Rapid Screen w/Reflex to PCR     Status: Normal    Specimen: Throat; Swab   Result Value Ref Range    Group A Strep antigen Negative Negative       ASSESSMENT/PLAN:  (R07.0) Throat pain  (primary encounter diagnosis)  Plan: Streptococcus A Rapid Screen w/Reflex to PCR,         Group A Streptococcus PCR Throat Swab, magic         mouthwash suspension, diphenhydrAMINE,         lidocaine, aluminum-magnesium & simethicone,         (FIRST-MOUTHWASH BLM) compounding kit            Reassurance given, reviewed that symptoms most likely viral etiology and encourage symptomatic treatment with tylenol, ibuprofen, plenty of fluids and rest.  Will follow up on throat culture and treat if positive for strep.  RX magic mouthwash given to use if desires for symptomatic treatment    Follow up with primary provider if no improvement of symptoms in 1 week    Willis Aguilar MD  February 23, 2022 3:01 PM

## 2022-02-24 ENCOUNTER — MYC MEDICAL ADVICE (OUTPATIENT)
Dept: PEDIATRICS | Facility: CLINIC | Age: 52
End: 2022-02-24
Payer: COMMERCIAL

## 2022-02-24 LAB
BKR LAB AP GYN ADEQUACY: NORMAL
BKR LAB AP GYN INTERPRETATION: NORMAL
BKR LAB AP HPV REFLEX: NORMAL
BKR LAB AP PREVIOUS ABNORMAL: NORMAL
PATH REPORT.COMMENTS IMP SPEC: NORMAL
PATH REPORT.COMMENTS IMP SPEC: NORMAL
PATH REPORT.RELEVANT HX SPEC: NORMAL

## 2022-02-24 NOTE — TELEPHONE ENCOUNTER
Suzanna,    Please see MC message from pt and advise regarding ultrasound results    Thank you  Harsh Polo RN on 2/24/2022 at 10:14 AM

## 2022-02-25 LAB
HUMAN PAPILLOMA VIRUS 16 DNA: NEGATIVE
HUMAN PAPILLOMA VIRUS 18 DNA: NEGATIVE
HUMAN PAPILLOMA VIRUS FINAL DIAGNOSIS: NORMAL
HUMAN PAPILLOMA VIRUS OTHER HR: NEGATIVE

## 2022-02-28 DIAGNOSIS — F32.81 PMDD (PREMENSTRUAL DYSPHORIC DISORDER): ICD-10-CM

## 2022-03-01 RX ORDER — CITALOPRAM HYDROBROMIDE 10 MG/1
TABLET ORAL
Qty: 90 TABLET | Refills: 3 | OUTPATIENT
Start: 2022-03-01

## 2022-03-07 ENCOUNTER — E-VISIT (OUTPATIENT)
Dept: URGENT CARE | Facility: CLINIC | Age: 52
End: 2022-03-07
Payer: COMMERCIAL

## 2022-03-07 DIAGNOSIS — B96.89 ACUTE BACTERIAL SINUSITIS: Primary | ICD-10-CM

## 2022-03-07 DIAGNOSIS — J01.90 ACUTE BACTERIAL SINUSITIS: Primary | ICD-10-CM

## 2022-03-07 PROCEDURE — 99421 OL DIG E/M SVC 5-10 MIN: CPT | Performed by: FAMILY MEDICINE

## 2022-03-07 NOTE — PATIENT INSTRUCTIONS
Dear Laya Zuleta    After reviewing your responses, I've been able to diagnose you with?a sinus infection caused by bacteria.?     Based on your responses and diagnosis, I have prescribed Augmentin to treat your symptoms. I have sent this to your pharmacy.?     It is also important to stay well hydrated, get lots of rest and take over-the-counter decongestants,?tylenol?or ibuprofen if you?are able to?take those medications per your primary care provider to help relieve discomfort.?     It is important that you take?all of?your prescribed medication even if your symptoms are improving after a few doses.? Taking?all of?your medicine helps prevent the symptoms from returning.?     If your symptoms worsen, you develop severe headache, vomiting, high fever (>102), or are not improving in 7 days, please contact your primary care provider for an appointment or visit any of our convenient Walk-in Care or Urgent Care Centers to be seen which can be found on our website?here.?     Thanks again for choosing?us?as your health care partner,?   ?  Allie Barrios MD?

## 2022-04-12 ENCOUNTER — OFFICE VISIT (OUTPATIENT)
Dept: OBGYN | Facility: CLINIC | Age: 52
End: 2022-04-12
Payer: COMMERCIAL

## 2022-04-12 VITALS — SYSTOLIC BLOOD PRESSURE: 110 MMHG | DIASTOLIC BLOOD PRESSURE: 76 MMHG | BODY MASS INDEX: 29.01 KG/M2 | WEIGHT: 188 LBS

## 2022-04-12 DIAGNOSIS — N95.0 POSTMENOPAUSAL BLEEDING: Primary | ICD-10-CM

## 2022-04-12 PROCEDURE — 57505 ENDOCERVICAL CURETTAGE: CPT | Performed by: OBSTETRICS & GYNECOLOGY

## 2022-04-12 PROCEDURE — 88305 TISSUE EXAM BY PATHOLOGIST: CPT | Performed by: PATHOLOGY

## 2022-04-12 NOTE — PROGRESS NOTES
INDICATIONS:  PMB and abnormal findings on U/S                                                    Is a pregnancy test required: No.  Was a consent obtained?  Yes    Having endometrial biopsy for post-menopausal bleeding and abnormal U/S    FINDINGS: 2/23/22 U/S     UTERUS: 8.5 x 4.3 x 5.1 cm. Heterogeneous with presumed fibroids in the posterior myometrium measuring 1.5 x 0.9 x 1.2 cm and 1.4 x 1.2 x 1.5 cm in size which do not involve the endometrial. 1.2 x 0.7 x 1.1 cm equivocal lesion in the cervix with adjacent   calcification.     ENDOMETRIUM: 6 mm. Normal smooth endometrium.     RIGHT OVARY: 2.3 x 1.5 x 2.2 cm. 1.4 x 0.9 x 1.0 cm simple appearing follicle or cyst, otherwise normal.     LEFT OVARY: Not visualized due to bowel gas.     No significant free fluid.                                                                      IMPRESSION:  1.  There are 2 presumed myometrial fibroids which do not involve the endometrium.  2.  Equivocal focal lesion in the region of the cervix and endocervical canal. Gynecologic consult recommended to consider direct visualization.  3.  Small right adnexal follicle or cyst. See guidelines below.        PROCEDURE;                                                      A speculum was placed in the vagina and cervix visualized. Tenaculum was not attached to the cervix. A small plastic 5 mm Pipelle syringe curette was inserted into the cervical canal. The uterus was sounded to 7.5 cm's. A vigorous four quadrant biopsy was performed, removing amount scant  of tissue. The speculum was removed. This tissue was placed in Formalin and sent to pathology.    An ECC was performed as the U/S demonstrated a questionable lesion in the cervix    The patient tolerated the procedure  fairly well and she reported there was  cramping.      POST PROCEDURE;                                                      There  was no cramping at the time of discharge. She  experienced moderate discomfort during  the procedure. There were no complications. Patient was discharged in stable condition.    Patient advised to call the clinic if severe pelvic pain, fever or heavy bleeding.    Brian Mace MD

## 2022-04-14 LAB
PATH REPORT.COMMENTS IMP SPEC: NORMAL
PATH REPORT.FINAL DX SPEC: NORMAL
PATH REPORT.FINAL DX SPEC: NORMAL
PATH REPORT.GROSS SPEC: NORMAL
PATH REPORT.GROSS SPEC: NORMAL
PATH REPORT.MICROSCOPIC SPEC OTHER STN: NORMAL
PATH REPORT.MICROSCOPIC SPEC OTHER STN: NORMAL
PATH REPORT.RELEVANT HX SPEC: NORMAL
PATH REPORT.RELEVANT HX SPEC: NORMAL
PHOTO IMAGE: NORMAL
PHOTO IMAGE: NORMAL

## 2022-05-17 ENCOUNTER — IMMUNIZATION (OUTPATIENT)
Dept: NURSING | Facility: CLINIC | Age: 52
End: 2022-05-17
Payer: COMMERCIAL

## 2022-05-17 PROCEDURE — 91305 COVID-19,PF,PFIZER (12+ YRS): CPT

## 2022-05-17 PROCEDURE — 0054A COVID-19,PF,PFIZER (12+ YRS): CPT

## 2022-10-16 ENCOUNTER — HEALTH MAINTENANCE LETTER (OUTPATIENT)
Age: 52
End: 2022-10-16

## 2022-10-17 DIAGNOSIS — F32.81 PMDD (PREMENSTRUAL DYSPHORIC DISORDER): ICD-10-CM

## 2022-10-18 ENCOUNTER — MYC MEDICAL ADVICE (OUTPATIENT)
Dept: PEDIATRICS | Facility: CLINIC | Age: 52
End: 2022-10-18

## 2022-10-18 DIAGNOSIS — F32.81 PMDD (PREMENSTRUAL DYSPHORIC DISORDER): ICD-10-CM

## 2022-10-18 RX ORDER — BUPROPION HYDROCHLORIDE 150 MG/1
TABLET ORAL
Qty: 180 TABLET | Refills: 3 | OUTPATIENT
Start: 2022-10-18

## 2022-10-18 NOTE — TELEPHONE ENCOUNTER
See patient's [employer] message regarding her bupropion medication refill request     Already approved buPROPion (WELLBUTRIN XL) 150 MG 24 hr tablet (180 tablets with 3 refills on 9/28/2021). Should have refills on file.   - Sent a [employer] message to the patient informing her that she should have refills on file at the [facility] in [place] ([id number] [place], [place] - [address] [facility])     Cadence GARCIA RN   Patient Advocate Liaison (PAL)  [facility] [place]

## 2022-10-18 NOTE — TELEPHONE ENCOUNTER
Already approved buPROPion (WELLBUTRIN XL) 150 MG 24 hr tablet (180 tablets with 3 refills on 9/28/2021). Should have refills on file.    Cadence GARCIA RN   Patient Advocate Liaison (PAL)  [facility] [place]

## 2022-10-19 RX ORDER — BUPROPION HYDROCHLORIDE 150 MG/1
150 TABLET ORAL EVERY MORNING
Qty: 90 TABLET | Refills: 0 | Status: SHIPPED | OUTPATIENT
Start: 2022-10-19 | End: 2022-12-08

## 2022-10-19 NOTE — TELEPHONE ENCOUNTER
Medication is being filled for 1 time refill only due to:  Patient needs to be seen because it has been more than one year since last visit. PHQ-9     Camila HARRISON RN, BSN

## 2022-10-27 ENCOUNTER — ANCILLARY PROCEDURE (OUTPATIENT)
Dept: MAMMOGRAPHY | Facility: CLINIC | Age: 52
End: 2022-10-27
Attending: NURSE PRACTITIONER
Payer: COMMERCIAL

## 2022-10-27 DIAGNOSIS — Z12.31 VISIT FOR SCREENING MAMMOGRAM: ICD-10-CM

## 2022-10-27 PROCEDURE — 77067 SCR MAMMO BI INCL CAD: CPT | Mod: TC | Performed by: RADIOLOGY

## 2022-10-27 PROCEDURE — 77063 BREAST TOMOSYNTHESIS BI: CPT | Mod: TC | Performed by: RADIOLOGY

## 2022-12-02 SDOH — ECONOMIC STABILITY: TRANSPORTATION INSECURITY
IN THE PAST 12 MONTHS, HAS THE LACK OF TRANSPORTATION KEPT YOU FROM MEDICAL APPOINTMENTS OR FROM GETTING MEDICATIONS?: NO

## 2022-12-02 SDOH — ECONOMIC STABILITY: FOOD INSECURITY: WITHIN THE PAST 12 MONTHS, THE FOOD YOU BOUGHT JUST DIDN'T LAST AND YOU DIDN'T HAVE MONEY TO GET MORE.: NEVER TRUE

## 2022-12-02 SDOH — HEALTH STABILITY: PHYSICAL HEALTH: ON AVERAGE, HOW MANY DAYS PER WEEK DO YOU ENGAGE IN MODERATE TO STRENUOUS EXERCISE (LIKE A BRISK WALK)?: 3 DAYS

## 2022-12-02 SDOH — ECONOMIC STABILITY: FOOD INSECURITY: WITHIN THE PAST 12 MONTHS, YOU WORRIED THAT YOUR FOOD WOULD RUN OUT BEFORE YOU GOT MONEY TO BUY MORE.: NEVER TRUE

## 2022-12-02 SDOH — HEALTH STABILITY: PHYSICAL HEALTH: ON AVERAGE, HOW MANY MINUTES DO YOU ENGAGE IN EXERCISE AT THIS LEVEL?: 40 MIN

## 2022-12-02 SDOH — ECONOMIC STABILITY: INCOME INSECURITY: HOW HARD IS IT FOR YOU TO PAY FOR THE VERY BASICS LIKE FOOD, HOUSING, MEDICAL CARE, AND HEATING?: NOT HARD AT ALL

## 2022-12-02 SDOH — ECONOMIC STABILITY: INCOME INSECURITY: IN THE LAST 12 MONTHS, WAS THERE A TIME WHEN YOU WERE NOT ABLE TO PAY THE MORTGAGE OR RENT ON TIME?: NO

## 2022-12-02 SDOH — ECONOMIC STABILITY: TRANSPORTATION INSECURITY
IN THE PAST 12 MONTHS, HAS LACK OF TRANSPORTATION KEPT YOU FROM MEETINGS, WORK, OR FROM GETTING THINGS NEEDED FOR DAILY LIVING?: NO

## 2022-12-02 ASSESSMENT — LIFESTYLE VARIABLES
HOW OFTEN DO YOU HAVE SIX OR MORE DRINKS ON ONE OCCASION: NEVER
HOW MANY STANDARD DRINKS CONTAINING ALCOHOL DO YOU HAVE ON A TYPICAL DAY: 1 OR 2
HOW OFTEN DO YOU HAVE A DRINK CONTAINING ALCOHOL: 2-3 TIMES A WEEK
AUDIT-C TOTAL SCORE: 3
SKIP TO QUESTIONS 9-10: 1

## 2022-12-02 ASSESSMENT — ENCOUNTER SYMPTOMS
BREAST MASS: 0
DIARRHEA: 0
SORE THROAT: 0
WEAKNESS: 0
CHILLS: 0
HEADACHES: 0
MYALGIAS: 0
CONSTIPATION: 0
PALPITATIONS: 0
HEARTBURN: 0
FEVER: 0
HEMATOCHEZIA: 0
JOINT SWELLING: 0
DYSURIA: 0
NAUSEA: 0
SHORTNESS OF BREATH: 0
COUGH: 0
EYE PAIN: 0
FREQUENCY: 0
ABDOMINAL PAIN: 0
HEMATURIA: 0
PARESTHESIAS: 0
NERVOUS/ANXIOUS: 0
DIZZINESS: 0
ARTHRALGIAS: 0

## 2022-12-02 ASSESSMENT — SOCIAL DETERMINANTS OF HEALTH (SDOH)
IN A TYPICAL WEEK, HOW MANY TIMES DO YOU TALK ON THE PHONE WITH FAMILY, FRIENDS, OR NEIGHBORS?: TWICE A WEEK
HOW OFTEN DO YOU ATTEND CHURCH OR RELIGIOUS SERVICES?: NEVER
DO YOU BELONG TO ANY CLUBS OR ORGANIZATIONS SUCH AS CHURCH GROUPS UNIONS, FRATERNAL OR ATHLETIC GROUPS, OR SCHOOL GROUPS?: YES
HOW OFTEN DO YOU GET TOGETHER WITH FRIENDS OR RELATIVES?: TWICE A WEEK

## 2022-12-03 ENCOUNTER — HEALTH MAINTENANCE LETTER (OUTPATIENT)
Age: 52
End: 2022-12-03

## 2022-12-08 ENCOUNTER — OFFICE VISIT (OUTPATIENT)
Dept: PEDIATRICS | Facility: CLINIC | Age: 52
End: 2022-12-08
Payer: COMMERCIAL

## 2022-12-08 VITALS
BODY MASS INDEX: 29.41 KG/M2 | OXYGEN SATURATION: 96 % | WEIGHT: 187.4 LBS | TEMPERATURE: 97.8 F | HEIGHT: 67 IN | RESPIRATION RATE: 16 BRPM | HEART RATE: 85 BPM | DIASTOLIC BLOOD PRESSURE: 76 MMHG | SYSTOLIC BLOOD PRESSURE: 112 MMHG

## 2022-12-08 DIAGNOSIS — F32.81 PMDD (PREMENSTRUAL DYSPHORIC DISORDER): ICD-10-CM

## 2022-12-08 DIAGNOSIS — R61 NIGHT SWEATS: ICD-10-CM

## 2022-12-08 DIAGNOSIS — Z00.00 HEALTHCARE MAINTENANCE: Primary | ICD-10-CM

## 2022-12-08 PROCEDURE — 99213 OFFICE O/P EST LOW 20 MIN: CPT | Mod: 25 | Performed by: NURSE PRACTITIONER

## 2022-12-08 PROCEDURE — 99396 PREV VISIT EST AGE 40-64: CPT | Performed by: NURSE PRACTITIONER

## 2022-12-08 RX ORDER — BUPROPION HYDROCHLORIDE 150 MG/1
150 TABLET ORAL EVERY MORNING
Qty: 90 TABLET | Refills: 3 | Status: SHIPPED | OUTPATIENT
Start: 2022-12-08 | End: 2023-04-11

## 2022-12-08 RX ORDER — CITALOPRAM HYDROBROMIDE 10 MG/1
TABLET ORAL
Qty: 90 TABLET | Refills: 3 | Status: SHIPPED | OUTPATIENT
Start: 2022-12-08 | End: 2023-04-28

## 2022-12-08 ASSESSMENT — ENCOUNTER SYMPTOMS
JOINT SWELLING: 0
DIARRHEA: 0
HEARTBURN: 0
DYSURIA: 0
MYALGIAS: 0
HEMATURIA: 0
NAUSEA: 0
BREAST MASS: 0
ARTHRALGIAS: 0
FREQUENCY: 0
CONSTIPATION: 0
DIZZINESS: 0
PALPITATIONS: 0
WEAKNESS: 0
HEMATOCHEZIA: 0
NERVOUS/ANXIOUS: 0
SHORTNESS OF BREATH: 0
FEVER: 0
COUGH: 0
SORE THROAT: 0
HEADACHES: 0
CHILLS: 0
PARESTHESIAS: 0
EYE PAIN: 0
ABDOMINAL PAIN: 0

## 2022-12-08 ASSESSMENT — PAIN SCALES - GENERAL: PAINLEVEL: NO PAIN (0)

## 2022-12-08 NOTE — PROGRESS NOTES
"  Assessment & Plan     (Z00.00) Healthcare maintenance  (primary encounter diagnosis)  Plan: CBC with platelets and differential, TSH with         free T4 reflex, Lipid panel reflex to direct         LDL Fasting    (F32.81) PMDD (premenstrual dysphoric disorder)  Comment: Wants to trial off of these meds.   Plan: buPROPion (WELLBUTRIN XL) 150 MG 24 hr tablet,         citalopram (CELEXA) 10 MG tablet        Reviewed weaning citalopram first, then wellbutrin. Reviewed withdrawal sx.     (R61) Night sweats  Comment: Ongoing for over a year. Has had 5 episodes where she had to change her shirt. No hot flashes or daytime sweating episodes. No fever, weight loss, fatigue, cough, lymphadenopathy. Sister has lymphoma, but was told it's not hereditary. Exam is reassuring today.  Plan:    -CBC, TSH  -Asked her to let me know if this gets any worse, or she develops any of the above sx and we would consider CXR vs chest CT vs other workup. Did discuss the possibility of lymphoma, although highly unlikely.  BMI:   Estimated body mass index is 29.35 kg/m  as calculated from the following:    Height as of this encounter: 1.702 m (5' 7\").    Weight as of this encounter: 85 kg (187 lb 6.4 oz).   Weight management plan: Discussed healthy diet and exercise guidelines        No follow-ups on file.    FLORENCE Zhu CNP  M [facility] JEANETTE Asif is a 52 year old, presenting for the following health issues:  Physical    Sweating on and off at night for a year  Has had to change her shirt 5x in the last several nights  Keeps house at 66 degrees  On celexa since 2014      Healthy Habits:     Getting at least 3 servings of Calcium per day:  Yes    Bi-annual eye exam:  Yes    Dental care twice a year:  Yes    Sleep apnea or symptoms of sleep apnea:  None    Diet:  Regular (no restrictions)    Frequency of exercise:  2-3 days/week    Duration of exercise:  15-30 minutes    Taking medications " "regularly:  Yes    Medication side effects:  None    PHQ-2 Total Score: 0    Additional concerns today:  No             Review of Systems   Constitutional: Negative for chills and fever.   HENT: Negative for congestion, ear pain, hearing loss and sore throat.    Eyes: Negative for pain and visual disturbance.   Respiratory: Negative for cough and shortness of breath.    Cardiovascular: Negative for chest pain, palpitations and peripheral edema.   Gastrointestinal: Negative for abdominal pain, constipation, diarrhea, heartburn, hematochezia and nausea.   Breasts:  Negative for tenderness, breast mass and discharge.   Genitourinary: Negative for dysuria, frequency, genital sores, hematuria, pelvic pain, urgency, vaginal bleeding and vaginal discharge.   Musculoskeletal: Negative for arthralgias, joint swelling and myalgias.   Skin: Negative for rash.   Neurological: Negative for dizziness, weakness, headaches and paresthesias.   Psychiatric/Behavioral: Negative for mood changes. The patient is not nervous/anxious.             Objective    /76 (BP Location: Right arm, Patient Position: Chair, Cuff Size: Adult Regular)   Pulse 85   Temp 97.8  F (36.6  C) (Tympanic)   Resp 16   Ht 1.702 m (5' 7\")   Wt 85 kg (187 lb 6.4 oz)   LMP 09/15/2015 (LMP Unknown)   SpO2 96%   BMI 29.35 kg/m    Body mass index is 29.35 kg/m .  Physical Exam   GENERAL: healthy, alert and no distress  EYES: Eyes grossly normal to inspection, PERRL and conjunctivae and sclerae normal  HENT: ear canals and TM's normal, nose and mouth without ulcers or lesions  NECK: no adenopathy, no asymmetry, masses, or scars and thyroid normal to palpation  RESP: lungs clear to auscultation - no rales, rhonchi or wheezes  CV: regular rate and rhythm, normal S1 S2, no S3 or S4, no murmur, click or rub, no peripheral edema and peripheral pulses strong  ABDOMEN: soft, nontender, no hepatosplenomegaly, no masses and bowel sounds normal  MS: no gross " musculoskeletal defects noted, no edema  SKIN: no suspicious lesions or rashes  NEURO: Normal strength and tone, mentation intact and speech normal  PSYCH: mentation appears normal, affect normal/bright

## 2022-12-29 ENCOUNTER — LAB (OUTPATIENT)
Dept: LAB | Facility: CLINIC | Age: 52
End: 2022-12-29
Payer: COMMERCIAL

## 2022-12-29 DIAGNOSIS — Z00.00 HEALTHCARE MAINTENANCE: ICD-10-CM

## 2022-12-29 LAB
BASOPHILS # BLD AUTO: 0.1 10E3/UL (ref 0–0.2)
BASOPHILS NFR BLD AUTO: 1 %
CHOLEST SERPL-MCNC: 252 MG/DL
EOSINOPHIL # BLD AUTO: 0.2 10E3/UL (ref 0–0.7)
EOSINOPHIL NFR BLD AUTO: 3 %
ERYTHROCYTE [DISTWIDTH] IN BLOOD BY AUTOMATED COUNT: 13.5 % (ref 10–15)
FASTING STATUS PATIENT QL REPORTED: YES
GLUCOSE SERPL-MCNC: 98 MG/DL (ref 70–99)
HCT VFR BLD AUTO: 45 % (ref 35–47)
HDLC SERPL-MCNC: 43 MG/DL
HGB BLD-MCNC: 14.9 G/DL (ref 11.7–15.7)
LDLC SERPL CALC-MCNC: 173 MG/DL
LYMPHOCYTES # BLD AUTO: 2.2 10E3/UL (ref 0.8–5.3)
LYMPHOCYTES NFR BLD AUTO: 31 %
MCH RBC QN AUTO: 30.2 PG (ref 26.5–33)
MCHC RBC AUTO-ENTMCNC: 33.1 G/DL (ref 31.5–36.5)
MCV RBC AUTO: 91 FL (ref 78–100)
MONOCYTES # BLD AUTO: 0.8 10E3/UL (ref 0–1.3)
MONOCYTES NFR BLD AUTO: 11 %
NEUTROPHILS # BLD AUTO: 3.9 10E3/UL (ref 1.6–8.3)
NEUTROPHILS NFR BLD AUTO: 55 %
NONHDLC SERPL-MCNC: 209 MG/DL
PLATELET # BLD AUTO: 310 10E3/UL (ref 150–450)
RBC # BLD AUTO: 4.93 10E6/UL (ref 3.8–5.2)
TRIGL SERPL-MCNC: 178 MG/DL
TSH SERPL DL<=0.005 MIU/L-ACNC: 2.5 UIU/ML (ref 0.3–4.2)
WBC # BLD AUTO: 7.1 10E3/UL (ref 4–11)

## 2022-12-29 PROCEDURE — 36415 COLL VENOUS BLD VENIPUNCTURE: CPT

## 2022-12-29 PROCEDURE — 84443 ASSAY THYROID STIM HORMONE: CPT

## 2022-12-29 PROCEDURE — 85025 COMPLETE CBC W/AUTO DIFF WBC: CPT

## 2022-12-29 PROCEDURE — 80061 LIPID PANEL: CPT

## 2022-12-29 PROCEDURE — 82947 ASSAY GLUCOSE BLOOD QUANT: CPT

## 2023-04-10 ENCOUNTER — VIRTUAL VISIT (OUTPATIENT)
Dept: PEDIATRICS | Facility: CLINIC | Age: 53
End: 2023-04-10
Payer: COMMERCIAL

## 2023-04-10 ENCOUNTER — TELEPHONE (OUTPATIENT)
Dept: PEDIATRICS | Facility: CLINIC | Age: 53
End: 2023-04-10

## 2023-04-10 DIAGNOSIS — E78.5 HYPERLIPIDEMIA, UNSPECIFIED HYPERLIPIDEMIA TYPE: ICD-10-CM

## 2023-04-10 DIAGNOSIS — R73.01 IMPAIRED FASTING GLUCOSE: ICD-10-CM

## 2023-04-10 PROCEDURE — 99214 OFFICE O/P EST MOD 30 MIN: CPT | Mod: VID | Performed by: NURSE PRACTITIONER

## 2023-04-10 NOTE — TELEPHONE ENCOUNTER
Central Prior Authorization Team   Phone: [phone]      PRIOR AUTHORIZATION DENIED    Medication: semaglutide (OZEMPIC) 2 MG/3ML SOPN pen - EPA DENIED     Denial Date: 4/10/2023    Denial Rational:             Appeal Information:

## 2023-04-10 NOTE — PROGRESS NOTES
Laya is a 52 year old who is being evaluated via a billable phone visit.      Assessment & Plan     (Z68.29) BMI 29.0-29.9,adult  (primary encounter diagnosis)  Comment: Previously didn't tolerate phentermine. Exercises regularly. Has had high blood sugars in the past and has hyperlipidemia.   Plan: semaglutide (OZEMPIC) 2 MG/3ML SOPN pen  -Will trial Ozempic.  -Reviewed 20-30g protein with every meal and strength training to avoid reducing skeletal muscle with rapid weight loss     (E78.5) Hyperlipidemia, unspecified hyperlipidemia type    (R73.01) Impaired fasting glucose    JORGE A MORENO, APRN CNP  M [facility] JEANETTE Asif is a 52 year old, presenting for the following health issues:  No chief complaint on file.         View : No data to display.              HPI       Review of Systems   Constitutional, HEENT, cardiovascular, pulmonary, gi and gu systems are negative, except as otherwise noted.      Objective           Vitals:  No vitals were obtained today due to virtual visit.    Physical Exam   N/A          Distant Location (provider location):  Off-site      Phone visit 12 minutes  Answers for HPI/ROS submitted by the patient on 4/7/2023  Are you regularly taking any medication or supplement to lower your cholesterol?: No  Are you having muscle aches or other side effects that you think could be caused by your cholesterol lowering medication?: No  On average, how many sweetened beverages do you drink each day (Examples: soda, juice, sweet tea, etc.  Do NOT count diet or artificially sweetened beverages)?: 0  How many minutes a day do you exercise enough to make your heart beat faster?: 10 to 19  How many days per week do you miss taking your medication?: 0

## 2023-04-11 ENCOUNTER — E-VISIT (OUTPATIENT)
Dept: URGENT CARE | Facility: CLINIC | Age: 53
End: 2023-04-11
Payer: COMMERCIAL

## 2023-04-11 DIAGNOSIS — J32.9 CHRONIC SINUSITIS, UNSPECIFIED LOCATION: Primary | ICD-10-CM

## 2023-04-11 PROCEDURE — 99421 OL DIG E/M SVC 5-10 MIN: CPT | Performed by: PREVENTIVE MEDICINE

## 2023-04-11 RX ORDER — CEFDINIR 300 MG/1
600 CAPSULE ORAL DAILY
Qty: 14 CAPSULE | Refills: 0 | Status: SHIPPED | OUTPATIENT
Start: 2023-04-11 | End: 2023-04-18

## 2023-04-11 RX ORDER — NALTREXONE HYDROCHLORIDE 50 MG/1
50 TABLET, FILM COATED ORAL DAILY
Qty: 90 TABLET | Refills: 3 | Status: SHIPPED | OUTPATIENT
Start: 2023-04-11 | End: 2023-04-28

## 2023-04-11 RX ORDER — BUPROPION HYDROCHLORIDE 300 MG/1
300 TABLET ORAL EVERY MORNING
Qty: 90 TABLET | Refills: 3 | Status: SHIPPED | OUTPATIENT
Start: 2023-04-11 | End: 2023-12-21

## 2023-04-11 NOTE — TELEPHONE ENCOUNTER
Please let her know there's no specific alternative plan right now as she's tried phentermine in the past. We could increase her wellbutrin or add naltrexone in the evenings to reduce food cravings. Please let her know these can all cause very modest weight loss, nothing like ozempic would. Ozempic may be more affordable in the coming years as it becomes more popular

## 2023-04-11 NOTE — TELEPHONE ENCOUNTER
Patient called back, relayed message below. Sheryl will call tomorrow AM to get lab appointments scheduled. And she will schedule a follow up with Suzanna if not loosing weight.     YIMI Lozano on 4/11/2023 at 4:49 PM

## 2023-04-11 NOTE — TELEPHONE ENCOUNTER
Rx ordered for wellbutrin 300mg  Rx ordered for naltrexone 50mg  Check liver function (ordered) now and in 1 month    Side effects rare, but include: Belly pain, nausea, change in stool, change in mood.    If not losing weight, we could consider increasing naltrexone to BID.     Should schedule phone follow-up in 3 months

## 2023-04-11 NOTE — TELEPHONE ENCOUNTER
Please advise if there is an alternate plan in mind or if you would like a visit to discuss other possible interventions with patient.     Thank you.     YIMI Lozano on 4/11/2023 at 8:27 AM

## 2023-04-11 NOTE — PATIENT INSTRUCTIONS
Sinusitis (Antibiotic Treatment)    The sinuses are air-filled spaces within the bones of the face. They connect to the inside of the nose. Sinusitis is an inflammation of the tissue that lines the sinuses. Sinusitis can occur during a cold. It can also happen due to allergies to pollens and other particles in the air. Sinusitis can cause symptoms of sinus congestion and a feeling of fullness. A sinus infection causes fever, headache, and facial pain. There is often green or yellow fluid draining from the nose or into the back of the throat (post-nasal drip). You have been given antibiotics to treat this condition.   Home care    Take the full course of antibiotics as instructed. Don't stop taking them, even when you feel better.    Drink plenty of water, hot tea, and other liquids as directed by the healthcare provider. This may help thin nasal mucus. It also may help your sinuses drain fluids.    Heat may help soothe painful areas of your face. Use a towel soaked in hot water. Or,  the shower and direct the warm spray onto your face. Using a vaporizer along with a menthol rub at night may also help soothe symptoms.     An expectorant with guaifenesin may help thin nasal mucus and help your sinuses drain fluids. Talk with your provider or pharmacists before taking an over-the-counter (OTC) medicine if you have any questions about it or its side effects..    You can use an OTC decongestant, unless a similar medicine was prescribed to you. Nasal sprays work the fastest. Use one that contains phenylephrine or oxymetazoline. First blow your nose gently. Then use the spray. Don't use these medicines more often than directed on the label. If you do, your symptoms may get worse. You may also take pills that contain pseudoephedrine. Don t use products that combine multiple medicines. This is because side effects may be increased. Read labels. You can also ask the pharmacist for help. (People with high blood  pressure should not use decongestants. They can raise blood pressure.) Talk with your provider or pharmacist if you have any questions about the medicine..    OTC antihistamines may help if allergies contributed to your sinusitis. Talk with your provider or pharmacist if you have any questions about the medicine.    Nasal rinses or irrigation may help symptoms. It's very important to use these products only as directed. Use sterile water or sterile saline solution and not tap water. Tap water may contain germs that can cause infection in the brain. Don't rinse with excess pressure. this may spread the infection to other areas in your sinuses or head. Ask your healthcare provider or pharmacist if you have questions about using these products.    Use acetaminophen, naproxen, or ibuprofen to control pain, unless another pain medicine was prescribed to you. Talk with your healthcare provider before using these medicines if you have chronic liver or kidney disease or ever had a stomach ulcer. Never give aspirin to anyone under age 18 without first talking to their healthcare provider. It may cause severe liver damage.    Don't smoke. This can make symptoms worse.    Follow-up care  Follow up with your healthcare provider as advised.   When to seek medical advice  Call your healthcare provider if any of these occur:     Facial pain or headache that gets worse    Symptoms don't go away in 10 days    Fever of 100.4 F (38 C) or higher, or as directed by your healthcare provider  Call 911  Call 911 if any of these occur:     Seizure    Trouble breathing    Feeling dizzy or faint    Fingernails, skin, or lips look blue, purple, or gray    Severe headache that doesn't go away    Stiff neck    Unusual drowsiness or confusion    Vision problems such as blurred or double vision    Swelling of your forehead or eyelids  Prevention  Here are steps you can take to help prevent an infection:     Keep good hand washing habits.    Don t  have close contact with people who have sore throats, colds, or other upper respiratory infections.    Don t smoke, and stay away from secondhand smoke.    Stay up to date with all of your vaccines.  [employer] last reviewed this educational content on 1/1/2022 2000-2022 [employer]. All rights reserved. This information is not intended as a substitute for professional medical care. Always follow your healthcare professional's instructions.        You may want to try a nasal lavage (also known as nasal irrigation). You can find over-the-counter products, such as Neti-Pot, at retail locations or make your own at home. Instructions for homemade nasal lavage and more information on the process are available online at http://www.aafp.org/afp/2009/1115/p1121.html.    Dear Laya Zuleta    After reviewing your responses, I've been able to diagnose you with Chronic sinusitis, unspecified location.      Based on your responses and diagnosis, I have prescribed   Orders Placed This Encounter     cefdinir (OMNICEF) 300 MG capsule    to treat your symptoms. I have sent this to your pharmacy.?     It is also important to stay well hydrated, get lots of rest and take over-the-counter decongestants,?tylenol?or ibuprofen if you?are able to?take those medications per your primary care provider to help relieve discomfort.?     It is important that you take?all of?your prescribed medication even if your symptoms are improving after a few doses.? Taking?all of?your medicine helps prevent the symptoms from returning.?     If your symptoms worsen, you develop severe headache, vomiting, high fever (>102), or are not improving in 7 days, please contact your primary care provider for an appointment or visit any of our convenient Walk-in Care or Urgent Care Centers to be seen which can be found on our website?here.?     Thanks again for choosing?us?as your health care partner,?   ?  Fco Schwarz MD,  MD?

## 2023-04-11 NOTE — TELEPHONE ENCOUNTER
Patient called back, informed her of insurance denial and possible alternate options.     Patient would like to try the increased wellbutrin and naltrexone.        Friend is getting it through a website [email or web address] but thinks it is billed through her insurance.     [facility] [place] [facility] [address].

## 2023-04-11 NOTE — TELEPHONE ENCOUNTER
Attempted to reach patient, LVMTCB. See provider message below.     Delgado JOHNS RN 4/11/2023 at 4:30 PM

## 2023-04-13 ENCOUNTER — LAB (OUTPATIENT)
Dept: LAB | Facility: CLINIC | Age: 53
End: 2023-04-13
Payer: COMMERCIAL

## 2023-04-13 LAB
ALBUMIN SERPL BCG-MCNC: 4.1 G/DL (ref 3.5–5.2)
ALP SERPL-CCNC: 90 U/L (ref 35–104)
ALT SERPL W P-5'-P-CCNC: 25 U/L (ref 10–35)
AST SERPL W P-5'-P-CCNC: 40 U/L (ref 10–35)
BILIRUB DIRECT SERPL-MCNC: <0.2 MG/DL (ref 0–0.3)
BILIRUB SERPL-MCNC: 0.5 MG/DL
PROT SERPL-MCNC: 7.2 G/DL (ref 6.4–8.3)

## 2023-04-13 PROCEDURE — 80076 HEPATIC FUNCTION PANEL: CPT

## 2023-04-13 PROCEDURE — 36415 COLL VENOUS BLD VENIPUNCTURE: CPT

## 2023-04-14 ENCOUNTER — TRANSFERRED RECORDS (OUTPATIENT)
Dept: HEALTH INFORMATION MANAGEMENT | Facility: CLINIC | Age: 53
End: 2023-04-14
Payer: COMMERCIAL

## 2023-04-21 ENCOUNTER — MYC MEDICAL ADVICE (OUTPATIENT)
Dept: PEDIATRICS | Facility: CLINIC | Age: 53
End: 2023-04-21
Payer: COMMERCIAL

## 2023-04-21 NOTE — TELEPHONE ENCOUNTER
Suzanna,    Please see MC message  Pt would like to try Wegovy since the Ozempic is not covered    Thank you  Harsh Polo RN on 4/21/2023 at 11:54 AM

## 2023-04-24 ENCOUNTER — TELEPHONE (OUTPATIENT)
Dept: PEDIATRICS | Facility: CLINIC | Age: 53
End: 2023-04-24

## 2023-04-24 ENCOUNTER — OFFICE VISIT (OUTPATIENT)
Dept: INTERNAL MEDICINE | Facility: CLINIC | Age: 53
End: 2023-04-24
Payer: COMMERCIAL

## 2023-04-24 VITALS
TEMPERATURE: 97.9 F | SYSTOLIC BLOOD PRESSURE: 120 MMHG | BODY MASS INDEX: 30.73 KG/M2 | DIASTOLIC BLOOD PRESSURE: 82 MMHG | HEART RATE: 79 BPM | WEIGHT: 195.8 LBS | RESPIRATION RATE: 16 BRPM | OXYGEN SATURATION: 98 % | HEIGHT: 67 IN

## 2023-04-24 DIAGNOSIS — B37.31 CANDIDAL VULVOVAGINITIS: ICD-10-CM

## 2023-04-24 DIAGNOSIS — N76.0 VAGINITIS AND VULVOVAGINITIS: Primary | ICD-10-CM

## 2023-04-24 PROCEDURE — 99213 OFFICE O/P EST LOW 20 MIN: CPT | Performed by: PHYSICIAN ASSISTANT

## 2023-04-24 RX ORDER — FLUCONAZOLE 150 MG/1
150 TABLET ORAL ONCE
Qty: 1 TABLET | Refills: 0 | Status: SHIPPED | OUTPATIENT
Start: 2023-04-24 | End: 2023-04-24

## 2023-04-24 RX ORDER — CLOTRIMAZOLE AND BETAMETHASONE DIPROPIONATE 10; .64 MG/G; MG/G
CREAM TOPICAL 2 TIMES DAILY
Qty: 45 G | Refills: 0 | Status: SHIPPED | OUTPATIENT
Start: 2023-04-24 | End: 2023-04-28

## 2023-04-24 NOTE — PROGRESS NOTES
"  Assessment & Plan     Vaginitis and vulvovaginitis    - fluconazole (DIFLUCAN) 150 MG tablet; Take 1 tablet (150 mg) by mouth once for 1 dose Repeat dose in 3 dose  - clotrimazole-betamethasone (LOTRISONE) 1-0.05 % external cream; Apply topically 2 times daily    Candidal vulvovaginitis    - fluconazole (DIFLUCAN) 150 MG tablet; Take 1 tablet (150 mg) by mouth once for 1 dose Repeat dose in 3 dose  - clotrimazole-betamethasone (LOTRISONE) 1-0.05 % external cream; Apply topically 2 times daily             Given hx and symptoms  Treatment as above as wet prep would be not beneficial due to use of monostat last night  If not improving then would do pelvic and wet prep etc    Kalie Draper PA-C  [facility] Asif is a 52 year old, presenting for the following health issues:  Vaginal Problem      History of Present Illness       Reason for visit:  Yeast infection from antibiotics  Symptom onset:  3-7 days ago  Symptoms include:  Swollen, burn, itching  Symptom intensity:  Severe  Symptom progression:  Staying the same  Had these symptoms before:  No    She eats 2-3 servings of fruits and vegetables daily.She consumes 1 sweetened beverage(s) daily.She exercises with enough effort to increase her heart rate 10 to 19 minutes per day.  She exercises with enough effort to increase her heart rate 3 or less days per week.   She is taking medications regularly.  Used monostat 1 day this weekend and then again used another last night.  Severe itching burning and swelling of the labia per patient              Review of Systems   Constitutional, HEENT, cardiovascular, pulmonary, gi and gu systems are negative, except as otherwise noted.      Objective    /82   Pulse 79   Temp 97.9  F (36.6  C) (Tympanic)   Resp 16   Ht 1.702 m (5' 7\")   Wt 88.8 kg (195 lb 12.8 oz)   LMP 09/15/2015 (LMP Unknown)   SpO2 98%   BMI 30.67 kg/m    Body mass index is 30.67 " kg/m .  Physical Exam   GENERAL: healthy, alert and no distress  RESP: lungs clear to auscultation - no rales, rhonchi or wheezes  CV: regular rates and rhythm   (female): deferred

## 2023-04-24 NOTE — TELEPHONE ENCOUNTER
Prior Authorization Retail Medication Request/CMM key# [id number]    Medication/Dose: Wegovy 0.25mg/0.5ml auto-injector  ICD code (if different than what is on RX):    Previously Tried and Failed:    Rationale:      Insurance Name:    Insurance ID:        Pharmacy Information (if different than what is on RX)  Name:  [facility]  Phone:  [phone]

## 2023-04-27 ENCOUNTER — MYC MEDICAL ADVICE (OUTPATIENT)
Dept: INTERNAL MEDICINE | Facility: CLINIC | Age: 53
End: 2023-04-27
Payer: COMMERCIAL

## 2023-04-27 DIAGNOSIS — N94.89 VAGINAL BURNING: ICD-10-CM

## 2023-04-27 DIAGNOSIS — N76.0 VAGINITIS AND VULVOVAGINITIS: ICD-10-CM

## 2023-04-27 DIAGNOSIS — N89.8 VAGINAL ITCHING: ICD-10-CM

## 2023-04-27 DIAGNOSIS — B37.31 CANDIDAL VULVOVAGINITIS: ICD-10-CM

## 2023-04-27 NOTE — TELEPHONE ENCOUNTER
Prior Authorization Approval    Authorization Effective Date: 4/27/2023  Authorization Expiration Date: 11/27/2023  Medication: Wegovy 0.25mg/0.5ml auto-injector  Approved Dose/Quantity:    Reference #:     Insurance Company: [employer] ([facility]) - Phone [phone] Fax [phone]  Expected CoPay:       CoPay Card Available:      Foundation Assistance Needed:    Which Pharmacy is filling the prescription (Not needed for infusion/clinic administered): [id number] - [place], [place] - [address] AT [address] [facility]  Pharmacy Notified: Yes  Patient Notified: Yes  **Instructed pharmacy to notify patient when script is ready to /ship.**

## 2023-04-27 NOTE — TELEPHONE ENCOUNTER
Central Prior Authorization Team   Phone: [phone]    PA Initiation    Medication: Wegovy 0.25mg/0.5ml auto-injector  Insurance Company: [employer] ([facility]) - Phone [phone] Fax [phone]  Pharmacy Filling the Rx: [id number] - [place], [place] - [address] AT [address]  Filling Pharmacy Phone: [phone]  Filling Pharmacy Fax:    Start Date: 4/27/2023

## 2023-04-28 ENCOUNTER — OFFICE VISIT (OUTPATIENT)
Dept: PEDIATRICS | Facility: CLINIC | Age: 53
End: 2023-04-28
Payer: COMMERCIAL

## 2023-04-28 VITALS
HEIGHT: 67 IN | TEMPERATURE: 97.2 F | BODY MASS INDEX: 30.67 KG/M2 | WEIGHT: 195.4 LBS | OXYGEN SATURATION: 99 % | HEART RATE: 78 BPM | DIASTOLIC BLOOD PRESSURE: 80 MMHG | SYSTOLIC BLOOD PRESSURE: 120 MMHG | RESPIRATION RATE: 18 BRPM

## 2023-04-28 DIAGNOSIS — N89.8 VAGINAL ITCHING: ICD-10-CM

## 2023-04-28 DIAGNOSIS — N94.89 VAGINAL BURNING: ICD-10-CM

## 2023-04-28 DIAGNOSIS — B37.31 CANDIDAL VULVOVAGINITIS: Primary | ICD-10-CM

## 2023-04-28 DIAGNOSIS — N76.0 VAGINITIS AND VULVOVAGINITIS: ICD-10-CM

## 2023-04-28 LAB
CLUE CELLS: ABNORMAL
TRICHOMONAS, WET PREP: ABNORMAL
WBC'S/HIGH POWER FIELD, WET PREP: ABNORMAL
YEAST, WET PREP: ABNORMAL

## 2023-04-28 PROCEDURE — 87210 SMEAR WET MOUNT SALINE/INK: CPT | Performed by: NURSE PRACTITIONER

## 2023-04-28 PROCEDURE — 99213 OFFICE O/P EST LOW 20 MIN: CPT | Performed by: NURSE PRACTITIONER

## 2023-04-28 RX ORDER — FLUCONAZOLE 150 MG/1
150 TABLET ORAL ONCE
Qty: 1 TABLET | Refills: 0 | Status: SHIPPED | OUTPATIENT
Start: 2023-04-28 | End: 2023-04-28

## 2023-04-28 RX ORDER — CLOTRIMAZOLE AND BETAMETHASONE DIPROPIONATE 10; .64 MG/G; MG/G
CREAM TOPICAL 2 TIMES DAILY
Qty: 45 G | Refills: 0 | Status: SHIPPED | OUTPATIENT
Start: 2023-04-28 | End: 2023-06-02

## 2023-04-28 ASSESSMENT — PAIN SCALES - GENERAL: PAINLEVEL: MILD PAIN (2)

## 2023-04-28 NOTE — PROGRESS NOTES
"Assessment & Plan     Candidal vulvovaginitis  Vaginitis and vulvovaginitis  Vaginal itching  Vaginal burning  Improving after x2 doses of diflucan but continues to have some itch/burn.  Wet prep neg but also has had x2 doses of diflucan and risks for candida include recent abx use for sinusitis.  PLAN  -x1 addtl dose of diflucan  -ok to keep using cream up to 1 more week  -if not improving, could consider topical estrogen but seems more likely to be acute vaginitis so hold on this for now. Also no UTI symptoms or STI risks.  - fluconazole (DIFLUCAN) 150 MG tablet; Take 1 tablet (150 mg) by mouth once for 1 dose  - clotrimazole-betamethasone (LOTRISONE) 1-0.05 % external cream; Apply topically 2 times daily    Patient Instructions   Take another dose of diflucan 72 hours from your previous dose  OK to keep using the cream for up 1 week  Let me know next week how you're feeling       Lesly Orozco NP  [facility] [place] Asif is a 52 year old, presenting for the following health issues:  Vaginal Problem        4/28/2023     9:19 AM   Additional Questions   Roomed by Alexa Schwarz CMA     HPI   Patient was seen for vaginal problem at [facility] on 4/24/23. States she does not feel symptoms are getting better.  Currently having irritation, burning sensation - both internal and external. Did take diflucan and says it is a little better but does not feel it has improved enough. Very uncomfortable.    Took diflucan x2 with minimal relief  Swelling improved  Burning with wiping   lotrisone cream also given    Denies any chance of STI  Denies any urinary frequency, abd pain, hematuria, or other UTI symptoms      Review of Systems         Objective    /80 (BP Location: Right arm, Cuff Size: Adult Regular)   Pulse 78   Temp 97.2  F (36.2  C) (Tympanic)   Resp 18   Ht 1.702 m (5' 7\")   Wt 88.6 kg (195 lb 6.4 oz)   LMP 09/15/2015 (LMP Unknown)   SpO2 99%   BMI 30.60 kg/m    Body " mass index is 30.6 kg/m .  Physical Exam   GENERAL: healthy, alert and no distress   (female): normal female external genitalia slight vulvar erythema but no lesions of swelling, normal urethral meatus , vaginal mucosa pink, moist, well rugated and vaginal discharge - scant    Results for orders placed or performed in visit on 04/28/23 (from the past 24 hour(s))   Wet prep - Clinic Collect    Specimen: Vagina; Swab   Result Value Ref Range    Trichomonas Absent Absent    Yeast Absent Absent    Clue Cells Absent Absent    WBCs/high power field 2+ (A) None

## 2023-04-28 NOTE — PATIENT INSTRUCTIONS
Take another dose of diflucan 72 hours from your previous dose  OK to keep using the cream for up 1 week  Let me know next week how you're feeling

## 2023-05-10 ENCOUNTER — E-VISIT (OUTPATIENT)
Dept: URGENT CARE | Facility: URGENT CARE | Age: 53
End: 2023-05-10
Payer: COMMERCIAL

## 2023-05-10 DIAGNOSIS — J01.90 ACUTE SINUSITIS WITH SYMPTOMS > 10 DAYS: Primary | ICD-10-CM

## 2023-05-10 PROCEDURE — 99421 OL DIG E/M SVC 5-10 MIN: CPT | Performed by: PHYSICIAN ASSISTANT

## 2023-05-10 NOTE — PATIENT INSTRUCTIONS
When to Use Antibiotics   Antibiotics are medicines used to treat infections caused by bacteria. They don t work for an illness caused by a virus. And they don't work for an allergic reaction. In fact, taking antibiotics for reasons other than an infection by bacteria can cause problems. You may have side effects from the medicine. And if you need an antibiotic in the future, it may not work well. This is because the bacteria can become immune to the medicine. You can also get a type of diarrhea that's hard to treat. This diarrhea is called C. diff.    When antibiotics likely won t help  Your healthcare provider won t usually give you antibiotics for the conditions listed below. You can help by not asking for them if you have:      A cold. This type of illness is caused by a virus. It can cause a runny nose, stuffed-up nose, sneezing, coughing, and headache. You may also have mild body aches and low fever. A cold gets better on its own in a few days to a week.    The flu (influenza). This is a respiratory illness caused by a virus. The flu usually goes away on its own in a week or so. It can cause fever, body aches, sore throat, and tiredness.    Bronchitis. This is an infection in the lungs. It is most often caused by a virus. You may have coughing, phlegm, body aches, and a low fever. A common type of bronchitis is known as a chest cold. This is called acute bronchitis. This often happens after you have a respiratory infection like a cold. Bronchitis can take weeks to go away. Antibiotics often don t help.    Most sore throats. Sore throats are most often caused by viruses. Your throat may feel scratchy or achy. It may hurt to swallow. You may also have a low fever and body aches. A sore throat usually gets better in a few days.    Most outer ear infections. An ear infection may be caused by a virus or bacteria. It causes pain in the ear. Antibiotics by mouth usually don t help. Low-dose antibiotic ear drops  work much better.    Some inner ear infections. An inner ear infection (otitis media) can be caused by a virus in the ear. It can also cause pain and a high fever. Most older children with low-grade fever don't need to be treated with antibiotics.    Most sinus infections. This is also known as sinusitis. This kind of infection causes sinus pain and swelling, and a runny nose. In most cases, it goes away on its own. Antibiotics don t make recovery quicker.    Allergic rhinitis. This is a set of symptoms caused by an allergic reaction. You may have sneezing, a runny nose, itchy or watery eyes, or a sore throat. Allergies are not treated with antibiotics.    Low fever. A mild fever that s less than 100.4 F (38 C) most likely doesn t need to be treated with antibiotics.   When antibiotics can help  Antibiotics can be used to treat:                                                       Strep throat. This is a throat infection caused by a certain type of bacteria. Symptoms of strep throat include a sore throat, white patches on the tonsils, red spots on the roof of the mouth, fever, body aches, and nausea and vomiting. Strep throat almost never causes a cough.    Urinary tract infection (UTI). This is an infection of the bladder and the tube that takes urine out of the body. It is caused by bacteria. It can cause burning pain and urine that s cloudy or tinted with blood. UTIs are very common. Antibiotics usually help treat them.    Some outer ear infections. In some cases, a healthcare provider may prescribe antibiotics by mouth for an ear infection. You may need a test to show the cause of the ear infection.    Some sinus infections. In some cases, your healthcare provider may give you antibiotics. They may first need to make sure your symptoms aren t caused by something else. This may be a virus, fungus, allergies, or air pollutants, such as smoke.  Pneumonia or other more severe forms of bacterial infection of the  lungs. You will generally have a high fever and severe coughing, often for several days. You may need tests to see exactly what bacteria is causing your pneumonia so you will receive the correct antibiotic.  Your healthcare provider may give you antibiotics if you have a condition that can affect your immune system. This includes diabetes or cancer.   Self-care at home  If your infection can t be treated with antibiotics, you can take other steps to feel better. Try the remedies below. In general:      Rest and sleep as much as needed.    Drink water and other clear fluids.    Don t smoke. Stay away from smoke from other people.    Use over-the-counter medicine, such as acetaminophen or ibuprofen to ease pain or fever, as directed by your healthcare provider.  To treat sinus pain or nasal stuffiness:     Put a warm, moist cloth on your face where you feel sinus pain or pressure.    Try a nasal spray with medicine or saline. Use as directed by your healthcare provider.    Breathe in steam from a hot shower.    Use a humidifier or cool mist vaporizer.   To quiet a cough:     Use a humidifier or cool mist vaporizer.    Breathe in steam from a hot shower.    Suck on cough lozenges.   To sooth a sore throat:     Suck on ice chips, frozen ice pops, or lozenges.    Use a sore throat spray.    Use a humidifier or cool mist vaporizer.    Gargle with saltwater.    Drink warm liquids.    Take ibuprofen to reduce swelling and pain.  To ease ear pain:     Hold a warm, moist washcloth on the ear for 10 minutes at a time.  [employer] last reviewed this educational content on 9/1/2022 2000-2022 [employer]. All rights reserved. This information is not intended as a substitute for professional medical care. Always follow your healthcare professional's instructions.        Dear Laya Zuleta    After reviewing your responses, it sounds like you have a second viral upper respiratory infection.  It is not uncommon  for people to get back-to-back illnesses especially this year.  More than likely this is viral illnesses lasted longer than 2 weeks.  Follow the instructions below    The vast majority of sinus infections are caused by viruses and improve after 12-14 days. This means antibiotics will not help your symptoms and possible give you undesirable side effects like diarrhea and upset stomach.    First, improve your sinus hygiene by:  Flonase/fluticasone nasal spray 2 sprays in each nostril once a day for 1 - 4 weeks.  This may take several days to become effective.   Consider saline nasal rinses  Ibuprofen 400 mg to 600 mg 4 times a day as needed for pain relief and anti-inflammatory  Mucinex may be helpful  Psuedofed can help if you tolerate it but do not take if you have high blood pressure  These can be bought Over the Counter at any Pharmacy.    If your symptoms are not improving or worsening after 12-14 days since symptom onset and 5-7 days of trying the above then you can get and try the Antibiotic prescribed because the sinus infection is likely to be bacterial at that point.    If your symptoms worsen, you develop severe headache, vomiting, high fever (>102), or are not improving in 7 days, please contact your primary care provider for an appointment or visit any of our convenient Walk-in Care or Urgent Care Centers to be seen which can be found on our website?here.?     Thanks again for choosing?us?as your health care partner,?   ?  Filippo Iglesias PA-C?

## 2023-05-16 ENCOUNTER — TRANSFERRED RECORDS (OUTPATIENT)
Dept: HEALTH INFORMATION MANAGEMENT | Facility: CLINIC | Age: 53
End: 2023-05-16

## 2023-05-17 ENCOUNTER — MYC MEDICAL ADVICE (OUTPATIENT)
Dept: PEDIATRICS | Facility: CLINIC | Age: 53
End: 2023-05-17
Payer: COMMERCIAL

## 2023-05-17 NOTE — TELEPHONE ENCOUNTER
Per chart review of last VV w/ PCP 4/10/23 r/t weight, follow up was advised for July. Routing refill request to refill pool to follow protocol.     Delgado JOHNS RN 5/17/2023 at 8:31 AM

## 2023-05-17 NOTE — TELEPHONE ENCOUNTER
The pt is aware and scheduled for her upcoming appointment.   Sheila Dickerson on 5/17/2023 at 12:22 PM

## 2023-05-17 NOTE — TELEPHONE ENCOUNTER
Routing refill request to provider for review/approval because:  Drug not on the [facility] refill protocol     Delgado JOHNS RN 5/17/2023 at 8:32 AM

## 2023-05-17 NOTE — TELEPHONE ENCOUNTER
Wegovy 0.5 sent. Should do phone visit follow-up in 2-3 weeks after starting this new dose, then can spread follow-up to q 6 months

## 2023-05-23 ENCOUNTER — TRANSFERRED RECORDS (OUTPATIENT)
Dept: PEDIATRICS | Facility: CLINIC | Age: 53
End: 2023-05-23
Payer: COMMERCIAL

## 2023-05-24 NOTE — TELEPHONE ENCOUNTER
Patient sending MyChart requesting wegovy at dose 0.25 due to supply chain issue, no 0.5mg in stock at pharmacies. Please advise if appropriate.     Delgado JOHNS RN 5/24/2023 at 3:03 PM

## 2023-05-25 NOTE — TELEPHONE ENCOUNTER
Received call back from patient. Inquiring on refill for wegovy 0.25 as 0.5 is out of stock at all local pharmacies she has contacted. Patient is traveling this weekend, would be near pharmacy in [place] on their way to [place], patient inquiring if she can stay on 0.25mg for now. Patient states she does seem to have some improvement on 0.25mg dose, lost 7lbs since starting. Patient is supposed to take next dose tomorrow.     Delgado JOHNS RN 5/25/2023 at 9:44 AM

## 2023-05-25 NOTE — TELEPHONE ENCOUNTER
Attempted to reach patient, LVMTCB. See provider message below, need to relay prescription for Semaglutide-Weight Management (WEGOVY) 0.25 MG/0.5ML pen was sent to [facility] pharmacy in [place], [place] 5/25/23.     Delgado JOHNS RN 5/25/2023 at 3:29 PM

## 2023-05-30 NOTE — TELEPHONE ENCOUNTER
Called and spoke with patient. Patient did not receive call from pharmacy regarding refill. Did not pick it up from [facility]. Patient will call around to local pharmacies to see who has this in stock. Patient will send [employer] message to inform us where to resend script.     Delgado JOHNS RN 5/30/2023 at 4:15 PM

## 2023-06-02 ENCOUNTER — TELEPHONE (OUTPATIENT)
Dept: PEDIATRICS | Facility: CLINIC | Age: 53
End: 2023-06-02
Payer: COMMERCIAL

## 2023-06-02 NOTE — TELEPHONE ENCOUNTER
The pt is aware the PA has been approved but she would like the rx sent to [facility]. The [facility] that it was sent to doesn't have it in stock. She is also wondering if she should reschedule her upcoming appointment on 6/9/23.   Sheila Dickerson on 6/2/2023 at 11:57 AM

## 2023-06-02 NOTE — TELEPHONE ENCOUNTER
The pt is aware of the providers message and has no further questions at this time.   Sheila Dickerson on 6/2/2023 at 12:33 PM

## 2023-06-02 NOTE — TELEPHONE ENCOUNTER
Prior Authorization Approval    Medication: SAXENDA 18 MG/3ML SC SOPN  Authorization Effective Date: 6/2/2023  Authorization Expiration Date: 12/2/2023  Approved Dose/Quantity: 15 ml per 28 days   Reference #: [id number]   Insurance Company: [employer] ([facility]) - Phone [phone] Fax [phone]  Expected CoPay: $50     CoPay Card Available: Yes    Financial Assistance Needed: No  Which Pharmacy is filling the prescription: [id number] - [place], [place] - [address] AT [facility]  Pharmacy Notified:  Yes  Patient Notified:  Yes **Instructed pharmacy to notify patient when script is ready to /ship.**            Thank you,     Troy Rock [facility]  Pharmacy Clinic [facility]  [email or web address]   Phone: [phone]  Fax: [phone]

## 2023-06-02 NOTE — TELEPHONE ENCOUNTER
PA Initiation    Medication: SAXENDA 18 MG/3ML SC SOPN  Insurance Company: [employer] ([facility]) - Phone [phone] Fax [phone]  Pharmacy Filling the Rx: [facility] #[id number] - [place], [place] - [address] AT [address]  Filling Pharmacy Phone: [phone]  Filling Pharmacy Fax: [phone]  Start Date: 6/2/2023            Thank you,     Troy Rock [facility]  Pharmacy Clinic [facility]  [email or web address]   Phone: [phone]  Fax: [phone]

## 2023-06-02 NOTE — TELEPHONE ENCOUNTER
Can postpone phone appt for 1 month if she likes. Would still like to check in on nutrition and progress

## 2023-06-06 ENCOUNTER — VIRTUAL VISIT (OUTPATIENT)
Dept: PSYCHOLOGY | Facility: CLINIC | Age: 53
End: 2023-06-06
Payer: COMMERCIAL

## 2023-06-06 DIAGNOSIS — F43.22 ADJUSTMENT DISORDER WITH ANXIETY: Primary | ICD-10-CM

## 2023-06-06 PROCEDURE — 90834 PSYTX W PT 45 MINUTES: CPT | Mod: VID | Performed by: MARRIAGE & FAMILY THERAPIST

## 2023-06-08 ASSESSMENT — ANXIETY QUESTIONNAIRES
1. FEELING NERVOUS, ANXIOUS, OR ON EDGE: SEVERAL DAYS
5. BEING SO RESTLESS THAT IT IS HARD TO SIT STILL: NOT AT ALL
2. NOT BEING ABLE TO STOP OR CONTROL WORRYING: NOT AT ALL
7. FEELING AFRAID AS IF SOMETHING AWFUL MIGHT HAPPEN: NOT AT ALL
6. BECOMING EASILY ANNOYED OR IRRITABLE: NOT AT ALL
GAD7 TOTAL SCORE: 2
IF YOU CHECKED OFF ANY PROBLEMS ON THIS QUESTIONNAIRE, HOW DIFFICULT HAVE THESE PROBLEMS MADE IT FOR YOU TO DO YOUR WORK, TAKE CARE OF THINGS AT HOME, OR GET ALONG WITH OTHER PEOPLE: SOMEWHAT DIFFICULT
3. WORRYING TOO MUCH ABOUT DIFFERENT THINGS: SEVERAL DAYS
GAD7 TOTAL SCORE: 2

## 2023-06-08 ASSESSMENT — COLUMBIA-SUICIDE SEVERITY RATING SCALE - C-SSRS
1. SINCE LAST CONTACT, HAVE YOU WISHED YOU WERE DEAD OR WISHED YOU COULD GO TO SLEEP AND NOT WAKE UP?: NO
2. HAVE YOU ACTUALLY HAD ANY THOUGHTS OF KILLING YOURSELF?: NO
TOTAL  NUMBER OF ABORTED OR SELF INTERRUPTED ATTEMPTS SINCE LAST CONTACT: NO
SUICIDE, SINCE LAST CONTACT: NO
ATTEMPT SINCE LAST CONTACT: NO
TOTAL  NUMBER OF INTERRUPTED ATTEMPTS SINCE LAST CONTACT: NO
6. HAVE YOU EVER DONE ANYTHING, STARTED TO DO ANYTHING, OR PREPARED TO DO ANYTHING TO END YOUR LIFE?: NO

## 2023-06-08 ASSESSMENT — PATIENT HEALTH QUESTIONNAIRE - PHQ9
5. POOR APPETITE OR OVEREATING: NOT AT ALL
SUM OF ALL RESPONSES TO PHQ QUESTIONS 1-9: 0

## 2023-06-08 NOTE — PROGRESS NOTES
[facility]                                     Progress Note    Patient Name: Laya Zuleta  Date: June 6, 2023       Service Type: Individual      Session Start Time: 10:00  Session End Time: 10:45     Session Length: 45 min    Session #: 20    Attendees: Client attended alone    Service Modality:  Video Visit:      Provider verified identity through the following two step process.  Patient provided:  Patient is known previously to provider    Telemedicine Visit: The patient's condition can be safely assessed and treated via synchronous audio and visual telemedicine encounter.      Reason for Telemedicine Visit: Patient convenience (e.g. access to timely appointments / distance to available provider)    Originating Site (Patient Location): Patient's home    Distant Site (Provider Location): [facility]    Consent:  The patient/guardian has verbally consented to: the potential risks and benefits of telemedicine (video visit) versus in person care; bill my insurance or make self-payment for services provided; and responsibility for payment of non-covered services.     Mode of Communication:  Video Conference via Doximity    Distant Location (Provider):  On-site    As the provider I attest to compliance with applicable laws and regulations related to telemedicine.       Treatment Plan Last Reviewed: June 6, 2023    DATA  Interactive Complexity: No  Crisis: No       Progress Since Last Session (Related to Symptoms / Goals / Homework):   Symptoms: Improving .    Homework: Achieved / completed to satisfaction with strong self-care performance.      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      Intervention:   Reviewed emotion regulation, distress tolerance, interpersonal effectiveness, mindfulness and self-care strategies that have been useful to gain improvement of symptoms. Client reports she has had some medical issues lately that have touched on consciousness of her age and mortality. She says she is also frustrated by the challenges of parenting her daughter. Processed emotions in session, validated, supportive counseling. Guidance offered to better utilize client's coping skills.    ASSESSMENT: Current Emotional / Mental Status (status of significant symptoms):   Risk status (Self / Other harm or suicidal ideation)   Patient denies current fears or concerns for personal safety.   Patient denies current or recent suicidal ideation or behaviors.   Patientdenies current or recent homicidal ideation or behaviors.   Patient denies current or recent self injurious behavior or ideation.   Patient denies other safety concerns.   Patient reports there has been no change in risk factors since their last session.     Patientreports there has been no change in protective factors since their last session.     Recommended that patient call 911 or go to the local ED should there be a change in any of these risk factors.     Appearance:   Appropriate    Eye Contact:   Good    Psychomotor Behavior: Normal    Attitude:   Pleasant   Orientation:   All   Speech    Rate / Production: Normal     Volume:  Normal    Mood:    Anxious    Affect:    Appropriate    Thought Content:  Clear    Thought Form:  Coherent  Logical    Insight:    Good      Medication Review:   No changes to current psychiatric medication(s)     Medication Compliance:   Yes     Changes in Health Issues:   None  reported     Chemical Use Review:   Substance Use: Chemical use reviewed, no active concerns identified      Tobacco Use: No current tobacco use.      Diagnosis:  Adjustment disorder with anxiety    Collateral Reports Completed:   Not Applicable    PLAN: (Patient Tasks / Therapist Tasks / Other)  Client will focus on skills and actions that have been key to improvement, including  sleep hygiene, nutrition, activity and social connections.       Chung Martinez MA, LMFT                                                         ______________________________________________________________________                                                 Treatment Plan    Client's Name: Laya Zuleta  YOB: 1970    Date: June 6, 2023    DSM-V Diagnoses: 309.24 - Adjustment Disorder with Anxiety  ; V71.09 - No Diagnosis  Psychosocial / Contextual Factors: daughter with bone disease  WHODAS: 13    Referral / Collaboration:  Referral to another professional/service is not indicated at this time..    Anticipated number of session or this episode of care: maintenance    Measurable Treatment Goal(s) related to diagnosis / functional impairment(s)   I will know I've met my goal when I learn tools to cope with and lower my anxiety.     Goal 1: Client will experience a significant reduction in ZAIRA-7 scores.     Objective #A   Client will learn and integrate DBT/CBT strategies to more effectively manage emotions and relationships.   Status: Continued: June 6, 2023    Intervention(s)   Therapist will teach emotional regulation, distress tolerance, interpersonal effectiveness, and mindfulness skills. Meditation resources will be offered. Therapist will teach TIPP skills: Temperature, Intense exercise, Paced breathing, and Paired Muscle Relaxation.    Objective #B   Client will learn to identify negative thoughts that are present and use cbt strategies to diffuse anxiety.  Status: Continued:June 6,  2023    Intervention(s)   Therapist will teach cognitive distortion identification and coping strategies.    Objective #C   Client will engage in positive self-care.  Status: Continued: June 6, 2023     Intervention(s)   Therapist will teach self-care goals:  Maintain balance in schedule (time for self/others, relaxation/activities, leisure/tasks, home/out of the house), assert needs and set limits with others, challenge negative thoughts/use affirming and encouraging self-talk, engage with support people on regular basis, practice behavior activation behaviors (sleep hygiene, balanced eating, physical activity, medical needs, personal hygiene), acknowledge and accept your feelings.      Patient has reviewed and agreed to the above plan.    Rich Martinez MA, LMFT  June 6, 2023

## 2023-06-09 ENCOUNTER — VIRTUAL VISIT (OUTPATIENT)
Dept: PEDIATRICS | Facility: CLINIC | Age: 53
End: 2023-06-09
Payer: COMMERCIAL

## 2023-06-09 DIAGNOSIS — E66.3 OVERWEIGHT (BMI 25.0-29.9): Primary | ICD-10-CM

## 2023-06-09 PROCEDURE — 99213 OFFICE O/P EST LOW 20 MIN: CPT | Mod: 95 | Performed by: NURSE PRACTITIONER

## 2023-06-09 NOTE — PROGRESS NOTES
"Laya is a 53 year old who is being evaluated via a billable telephone visit.      What phone number would you like to be contacted at? [phone]  How would you like to obtain your AVS? Ramón  Distant Location (provider location):  On-site    Assessment & Plan     (E66.3) Overweight (BMI 25.0-29.9)  (primary encounter diagnosis)  Comment: Hast lost about 10 lbs on Wegovy. Just had to switch to Saxenda due to product availability. Having some heartburn, which is manageable.  Plan: PRIMARY CARE FOLLOW-UP SCHEDULING  -Continue 20g protein TID  -Increase strength based exercise  -Continue to up titrate Saxenda. Insurance coverage for this may run out in November.   -Follow-up at physical in December     BMI:   Estimated body mass index is 30.6 kg/m  as calculated from the following:    Height as of 4/28/23: 1.702 m (5' 7\").    Weight as of 4/28/23: 88.6 kg (195 lb 6.4 oz).   Weight management plan: Discussed healthy diet and exercise guidelines    See Patient Instructions    JORGE A MORENO, [place] CNP  [facility] [place] Asif is a 53 year old, presenting for the following health issues:  Weight Check (Checking in with provider about nutrition and medication.)        6/9/2023     8:43 AM   Additional Questions   Roomed by Lou   Accompanied by self         6/9/2023     8:43 AM   Patient Reported Additional Medications   Patient reports taking the following new medications none     History of Present Illness       Reason for visit:  Nutrition follow up    She eats 2-3 servings of fruits and vegetables daily.She consumes 0 sweetened beverage(s) daily.She exercises with enough effort to increase her heart rate 30 to 60 minutes per day.  She exercises with enough effort to increase her heart rate 6 days per week.   She is taking medications regularly.           Review of Systems   Constitutional, HEENT, cardiovascular, pulmonary, gi and gu systems are negative, except as " "otherwise noted.      Objective    Vitals - Patient Reported  Weight (Patient Reported): 83 kg (183 lb)  Height (Patient Reported): 170.2 cm (5' 7\")  BMI (Based on Pt Reported Ht/Wt): 28.66        Physical Exam   N/A    Phone call duration: 10 minutes    "

## 2023-06-20 ENCOUNTER — VIRTUAL VISIT (OUTPATIENT)
Dept: PSYCHOLOGY | Facility: CLINIC | Age: 53
End: 2023-06-20
Payer: COMMERCIAL

## 2023-06-20 DIAGNOSIS — F43.22 ADJUSTMENT DISORDER WITH ANXIETY: Primary | ICD-10-CM

## 2023-06-20 PROCEDURE — 90834 PSYTX W PT 45 MINUTES: CPT | Mod: VID | Performed by: MARRIAGE & FAMILY THERAPIST

## 2023-06-22 NOTE — PROGRESS NOTES
[facility]                                     Progress Note    Patient Name: Laya Zuleta  Date: June 20, 2023       Service Type: Individual      Session Start Time: 12:00  Session End Time: 12:45     Session Length: 45 min    Session #: 21    Attendees: Client attended alone    Service Modality:  Video Visit:      Provider verified identity through the following two step process.  Patient provided:  Patient is known previously to provider    Telemedicine Visit: The patient's condition can be safely assessed and treated via synchronous audio and visual telemedicine encounter.      Reason for Telemedicine Visit: Patient convenience (e.g. access to timely appointments / distance to available provider)    Originating Site (Patient Location): Patient's home    Distant Site (Provider Location): [facility] [place]    Consent:  The patient/guardian has verbally consented to: the potential risks and benefits of telemedicine (video visit) versus in person care; bill my insurance or make self-payment for services provided; and responsibility for payment of non-covered services.     Mode of Communication:  Video Conference via Doximity    Distant Location (Provider):  On-site    As the provider I attest to compliance with applicable laws and regulations related to telemedicine.       Treatment Plan Last Reviewed: June 6, 2023    DATA  Interactive Complexity: No  Crisis: No       Progress Since Last Session (Related to Symptoms / Goals / Homework):   Symptoms: Improving .    Homework: Achieved / completed to satisfaction with strong self-care performance.      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      Intervention:   Reviewed emotion regulation, distress tolerance, interpersonal effectiveness, mindfulness and self-care strategies that have been useful to gain improvement of symptoms. Client reports she is struggling with the behavior of her , which is often embarrassing for her. She says he is often blunt, hurtful and lacking tact. She believes he is on the autism spectrum probably. Role-played ways to address with him. Processed emotions in session, validated, supportive counseling. Guidance offered to better utilize client's coping skills.    ASSESSMENT: Current Emotional / Mental Status (status of significant symptoms):   Risk status (Self / Other harm or suicidal ideation)   Patient denies current fears or concerns for personal safety.   Patient denies current or recent suicidal ideation or behaviors.   Patientdenies current or recent homicidal ideation or behaviors.   Patient denies current or recent self injurious behavior or ideation.   Patient denies other safety concerns.   Patient reports there has been no change in risk factors since their last session.     Patientreports there has been no change in protective factors since their last session.     Recommended that patient call 911 or go to the local ED should there be a change in any of these risk factors.     Appearance:   Appropriate    Eye Contact:   Good    Psychomotor Behavior: Normal    Attitude:   Interested   Orientation:   All   Speech    Rate / Production: Normal     Volume:  Normal    Mood:    Anxious    Affect:    Appropriate    Thought Content:  Clear    Thought Form:   Coherent  Logical    Insight:    Good      Medication Review:   No changes to current psychiatric medication(s)     Medication  Compliance:   Yes     Changes in Health Issues:   None reported     Chemical Use Review:   Substance Use: Chemical use reviewed, no active concerns identified      Tobacco Use: No current tobacco use.      Diagnosis:  Adjustment disorder with anxiety    Collateral Reports Completed:   Not Applicable    PLAN: (Patient Tasks / Therapist Tasks / Other)  Client will focus on skills and actions that have been key to improvement, including  sleep hygiene, nutrition, activity and social connections. She will use interpersonal effectiveness skills to address her 's social behavior.      Chung Martinez MA, LMFT                                                         ______________________________________________________________________                                                 Treatment Plan    Client's Name: Laya Zuleta  YOB: 1970    Date: June 6, 2023    DSM-V Diagnoses: 309.24 - Adjustment Disorder with Anxiety  ; V71.09 - No Diagnosis  Psychosocial / Contextual Factors: daughter with bone disease  WHODAS: 13    Referral / Collaboration:  Referral to another professional/service is not indicated at this time..    Anticipated number of session or this episode of care: maintenance    Measurable Treatment Goal(s) related to diagnosis / functional impairment(s)   I will know I've met my goal when I learn tools to cope with and lower my anxiety.     Goal 1: Client will experience a significant reduction in ZAIRA-7 scores.     Objective #A   Client will learn and integrate DBT/CBT strategies to more effectively manage emotions and relationships.   Status: Continued: June 6, 2023    Intervention(s)   Therapist will teach emotional regulation, distress tolerance, interpersonal effectiveness, and mindfulness skills. Meditation resources will be offered. Therapist will teach TIPP skills: Temperature, Intense exercise, Paced breathing, and Paired Muscle Relaxation.    Objective #B   Client will  learn to identify negative thoughts that are present and use cbt strategies to diffuse anxiety.  Status: Continued:June 6, 2023    Intervention(s)   Therapist will teach cognitive distortion identification and coping strategies.    Objective #C   Client will engage in positive self-care.  Status: Continued: June 6, 2023     Intervention(s)   Therapist will teach self-care goals:  Maintain balance in schedule (time for self/others, relaxation/activities, leisure/tasks, home/out of the house), assert needs and set limits with others, challenge negative thoughts/use affirming and encouraging self-talk, engage with support people on regular basis, practice behavior activation behaviors (sleep hygiene, balanced eating, physical activity, medical needs, personal hygiene), acknowledge and accept your feelings.      Patient has reviewed and agreed to the above plan.    Rich Martinez MA, LMFT  June 6, 2023

## 2023-07-18 ENCOUNTER — OFFICE VISIT (OUTPATIENT)
Dept: PSYCHOLOGY | Facility: CLINIC | Age: 53
End: 2023-07-18
Payer: COMMERCIAL

## 2023-07-18 DIAGNOSIS — F43.22 ADJUSTMENT DISORDER WITH ANXIETY: Primary | ICD-10-CM

## 2023-07-18 PROCEDURE — 90834 PSYTX W PT 45 MINUTES: CPT | Performed by: MARRIAGE & FAMILY THERAPIST

## 2023-07-20 NOTE — PROGRESS NOTES
[facility]                                     Progress Note    Patient Name: Laya Zuleta  Date: July 18, 2023       Service Type: Individual      Session Start Time: 12:00  Session End Time: 12:45     Session Length: 45 min    Session #: 22    Attendees: Client attended alone    Service Modality:  In person     Treatment Plan Last Reviewed: June 6, 2023    DATA  Interactive Complexity: No  Crisis: No       Progress Since Last Session (Related to Symptoms / Goals / Homework):   Symptoms: Improving .    Homework: Achieved / completed to satisfaction with strong self-care performance.      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      Intervention:   Taught/reviewed/role-played interpersonal effectiveness, communication, negotiation and boundary setting skills. Client reports she is frustrated by her relationship with her adolescent daughter. She says a recent neuro-psych exam diagnosed autism spectrum, but was doubtful ADHD was an appropriate diagnosis. She says her daughter's therapist endorses ADHD. She says her daughter seems to be triangulating with her therapist, often saying she will discuss with therapist or use therapist as the authority. Client says she will discuss concerns with the therapist. Client says she is challenged by knowing the best parenting approaches, as she did not receive nurturing parenting from her own mother. Processed emotions in session, validated, supportive counseling. Guidance offered to better utilize  client's coping skills.    ASSESSMENT: Current Emotional / Mental Status (status of significant symptoms):   Risk status (Self / Other harm or suicidal ideation)   Patient denies current fears or concerns for personal safety.   Patient denies current or recent suicidal ideation or behaviors.   Patientdenies current or recent homicidal ideation or behaviors.   Patient denies current or recent self injurious behavior or ideation.   Patient denies other safety concerns.   Patient reports there has been no change in risk factors since their last session.     Patientreports there has been no change in protective factors since their last session.     Recommended that patient call 911 or go to the local ED should there be a change in any of these risk factors.     Appearance:   Appropriate    Eye Contact:   Good    Psychomotor Behavior: Normal    Attitude:   Cooperative  Attentive   Orientation:   All   Speech    Rate / Production: Normal     Volume:  Normal    Mood:    Angry  Anxious    Affect:    Appropriate    Thought Content:  Clear    Thought Form:   Coherent  Logical    Insight:    Good      Medication Review:   No changes to current psychiatric medication(s)     Medication Compliance:   Yes     Changes in Health Issues:   None reported     Chemical Use Review:   Substance Use: Chemical use reviewed, no active concerns identified      Tobacco Use: No current tobacco use.      Diagnosis:  Adjustment disorder with anxiety    Collateral Reports Completed:   Not Applicable    PLAN: (Patient Tasks / Therapist Tasks / Other)  Client will discuss concerns with her daughter's therapist and return to therapy to discuss the effects of her upbringing on her own parenting skills.      Chung Martinez MA, LMFT                                                         ______________________________________________________________________                                                 Treatment Plan    Client's Name: Laya SHUKLA  Song  YOB: 1970    Date: June 6, 2023    DSM-V Diagnoses: 309.24 - Adjustment Disorder with Anxiety  ; V71.09 - No Diagnosis  Psychosocial / Contextual Factors: daughter with bone disease  WHODAS: 13    Referral / Collaboration:  Referral to another professional/service is not indicated at this time..    Anticipated number of session or this episode of care: maintenance    Measurable Treatment Goal(s) related to diagnosis / functional impairment(s)   I will know I've met my goal when I learn tools to cope with and lower my anxiety.     Goal 1: Client will experience a significant reduction in ZAIRA-7 scores.     Objective #A   Client will learn and integrate DBT/CBT strategies to more effectively manage emotions and relationships.   Status: Continued: June 6, 2023    Intervention(s)   Therapist will teach emotional regulation, distress tolerance, interpersonal effectiveness, and mindfulness skills. Meditation resources will be offered. Therapist will teach TIPP skills: Temperature, Intense exercise, Paced breathing, and Paired Muscle Relaxation.    Objective #B   Client will learn to identify negative thoughts that are present and use cbt strategies to diffuse anxiety.  Status: Continued:June 6, 2023    Intervention(s)   Therapist will teach cognitive distortion identification and coping strategies.    Objective #C   Client will engage in positive self-care.  Status: Continued: June 6, 2023     Intervention(s)   Therapist will teach self-care goals:  Maintain balance in schedule (time for self/others, relaxation/activities, leisure/tasks, home/out of the house), assert needs and set limits with others, challenge negative thoughts/use affirming and encouraging self-talk, engage with support people on regular basis, practice behavior activation behaviors (sleep hygiene, balanced eating, physical activity, medical needs, personal hygiene), acknowledge and accept your feelings.      Patient has reviewed  and agreed to the above plan.    Rich Martinez MA, LMFT  June 6, 2023

## 2023-08-01 ENCOUNTER — OFFICE VISIT (OUTPATIENT)
Dept: PSYCHOLOGY | Facility: CLINIC | Age: 53
End: 2023-08-01
Payer: COMMERCIAL

## 2023-08-01 DIAGNOSIS — F43.22 ADJUSTMENT DISORDER WITH ANXIETY: Primary | ICD-10-CM

## 2023-08-01 PROCEDURE — 90834 PSYTX W PT 45 MINUTES: CPT | Performed by: MARRIAGE & FAMILY THERAPIST

## 2023-08-03 NOTE — PROGRESS NOTES
[facility]                                     Progress Note    Patient Name: Laya Zuleta  Date: August 1, 2023        Service Type: Individual      Session Start Time: 12:00  Session End Time: 12:45     Session Length: 45 min    Session #: 23    Attendees: Client attended alone    Service Modality:  In person     Treatment Plan Last Reviewed: June 6, 2023    DATA  Interactive Complexity: No  Crisis: No       Progress Since Last Session (Related to Symptoms / Goals / Homework):   Symptoms: Improving .    Homework: Achieved / completed to satisfaction with strong self-care performance.      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      Intervention:   Taught/reviewed/role-played interpersonal effectiveness, communication, negotiation and boundary setting skills.  Client reports her relationship with her adolescent daughter is in the process of improving. She says she spoke with her daughter's therapist about the triangulating her daughter uses with client and therapist. Chain analysis of interactions with  that can be more productive conflict when he is being emotionally reactive. Processed emotions in session, validated, supportive counseling. Guidance offered to better utilize client's coping skills.    ASSESSMENT: Current Emotional / Mental Status (status of significant symptoms):   Risk status (Self / Other harm or suicidal ideation)   Patient denies current fears or concerns for personal safety.   Patient denies current or  recent suicidal ideation or behaviors.   Patientdenies current or recent homicidal ideation or behaviors.   Patient denies current or recent self injurious behavior or ideation.   Patient denies other safety concerns.   Patient reports there has been no change in risk factors since their last session.     Patientreports there has been no change in protective factors since their last session.     Recommended that patient call 911 or go to the local ED should there be a change in any of these risk factors.     Appearance:   Appropriate    Eye Contact:   Good    Psychomotor Behavior: Normal    Attitude:   Interested   Orientation:   All   Speech    Rate / Production: Normal     Volume:  Normal    Mood:    Anxious    Affect:    Appropriate    Thought Content:  Clear    Thought Form:  Coherent  Logical    Insight:    Good      Medication Review:   No changes to current psychiatric medication(s)     Medication Compliance:   Yes     Changes in Health Issues:   None reported     Chemical Use Review:   Substance Use: Chemical use reviewed, no active concerns identified      Tobacco Use: No current tobacco use.      Diagnosis:  Adjustment disorder with anxiety    Collateral Reports Completed:   Not Applicable    PLAN: (Patient Tasks / Therapist Tasks / Other)  Client will use suggestions of her daughter's therapist and will use interpersonal effectiveness skills with her .    Chung Martinez MA, LMFT                                                         ______________________________________________________________________                                                 Treatment Plan    Client's Name: Laya Zuleta  YOB: 1970    Date: June 6, 2023    DSM-V Diagnoses: 309.24 - Adjustment Disorder with Anxiety  ; V71.09 - No Diagnosis  Psychosocial / Contextual Factors: daughter with bone disease  WHODAS: 13    Referral / Collaboration:  Referral to another professional/service is not indicated  at this time..    Anticipated number of session or this episode of care: maintenance    Measurable Treatment Goal(s) related to diagnosis / functional impairment(s)   I will know I've met my goal when I learn tools to cope with and lower my anxiety.     Goal 1: Client will experience a significant reduction in ZAIRA-7 scores.     Objective #A   Client will learn and integrate DBT/CBT strategies to more effectively manage emotions and relationships.   Status: Continued: June 6, 2023    Intervention(s)   Therapist will teach emotional regulation, distress tolerance, interpersonal effectiveness, and mindfulness skills. Meditation resources will be offered. Therapist will teach TIPP skills: Temperature, Intense exercise, Paced breathing, and Paired Muscle Relaxation.    Objective #B   Client will learn to identify negative thoughts that are present and use cbt strategies to diffuse anxiety.  Status: Continued:June 6, 2023    Intervention(s)   Therapist will teach cognitive distortion identification and coping strategies.    Objective #C   Client will engage in positive self-care.  Status: Continued: June 6, 2023     Intervention(s)   Therapist will teach self-care goals:  Maintain balance in schedule (time for self/others, relaxation/activities, leisure/tasks, home/out of the house), assert needs and set limits with others, challenge negative thoughts/use affirming and encouraging self-talk, engage with support people on regular basis, practice behavior activation behaviors (sleep hygiene, balanced eating, physical activity, medical needs, personal hygiene), acknowledge and accept your feelings.      Patient has reviewed and agreed to the above plan.    Rich Martinez MA, LMFT  June 6, 2023

## 2023-08-29 ENCOUNTER — TRANSFERRED RECORDS (OUTPATIENT)
Dept: HEALTH INFORMATION MANAGEMENT | Facility: CLINIC | Age: 53
End: 2023-08-29
Payer: COMMERCIAL

## 2023-08-29 ENCOUNTER — OFFICE VISIT (OUTPATIENT)
Dept: PSYCHOLOGY | Facility: CLINIC | Age: 53
End: 2023-08-29
Payer: COMMERCIAL

## 2023-08-29 DIAGNOSIS — F43.22 ADJUSTMENT DISORDER WITH ANXIETY: Primary | ICD-10-CM

## 2023-08-29 PROCEDURE — 90834 PSYTX W PT 45 MINUTES: CPT | Performed by: MARRIAGE & FAMILY THERAPIST

## 2023-08-31 ENCOUNTER — TRANSFERRED RECORDS (OUTPATIENT)
Dept: HEALTH INFORMATION MANAGEMENT | Facility: CLINIC | Age: 53
End: 2023-08-31
Payer: COMMERCIAL

## 2023-08-31 NOTE — PROGRESS NOTES
[facility]                                     Progress Note    Patient Name: Laya Zuleta  Date: August 29, 2023        Service Type: Individual      Session Start Time: 11:00  Session End Time: 11:45     Session Length: 45 min    Session #: 24    Attendees: Client attended alone    Service Modality:  In person     Treatment Plan Last Reviewed: June 6, 2023    DATA  Interactive Complexity: No  Crisis: No       Progress Since Last Session (Related to Symptoms / Goals / Homework):   Symptoms: Improving .    Homework: Achieved / completed to satisfaction with strong self-care performance.      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     Intervention:   Taught/reviewed/role-played   Love and Logic parenting skills. Client reports she had a health scare when she came in contact with a dead bat and considered rabies prevention. She says her stress has been highest around the parenting of her ASD daughter. Processed emotions in session, validated, supportive counseling. Guidance offered to better utilize client's coping skills.    ASSESSMENT: Current Emotional / Mental Status (status of significant symptoms):   Risk status (Self / Other harm or suicidal ideation)   Patient denies current fears or concerns for personal safety.   Patient denies current or recent suicidal ideation or behaviors.   Patientdenies current or recent homicidal ideation or behaviors.   Patient denies current or recent self injurious behavior or ideation.   Patient denies other safety  concerns.   Patient reports there has been no change in risk factors since their last session.     Patientreports there has been no change in protective factors since their last session.     Recommended that patient call 911 or go to the local ED should there be a change in any of these risk factors.     Appearance:   Appropriate    Eye Contact:   Good    Psychomotor Behavior: Normal    Attitude:   Friendly Attentive   Orientation:   All   Speech    Rate / Production: Normal     Volume:  Normal    Mood:    Anxious    Affect:    Appropriate    Thought Content:  Clear    Thought Form:  Coherent  Logical    Insight:    Good      Medication Review:   No changes to current psychiatric medication(s)     Medication Compliance:   Yes     Changes in Health Issues:   None reported     Chemical Use Review:   Substance Use: Chemical use reviewed, no active concerns identified      Tobacco Use: No current tobacco use.      Diagnosis:  Adjustment disorder with anxiety    Collateral Reports Completed:   Not Applicable    PLAN: (Patient Tasks / Therapist Tasks / Other)  Client will use Love and Logic parenting skills with their children..    Chung Martinez MA, LMFT                                                         ______________________________________________________________________                                                 Treatment Plan    Client's Name: Laya Zuleta  YOB: 1970    Date: June 6, 2023    DSM-V Diagnoses: 309.24 - Adjustment Disorder with Anxiety  ; V71.09 - No Diagnosis  Psychosocial / Contextual Factors: daughter with bone disease  WHODAS: 13    Referral / Collaboration:  Referral to another professional/service is not indicated at this time..    Anticipated number of session or this episode of care: maintenance    Measurable Treatment Goal(s) related to diagnosis / functional impairment(s)   I will know I've met my goal when I learn tools to cope with and lower my  anxiety.     Goal 1: Client will experience a significant reduction in ZAIRA-7 scores.     Objective #A   Client will learn and integrate DBT/CBT strategies to more effectively manage emotions and relationships.   Status: Continued: June 6, 2023    Intervention(s)   Therapist will teach emotional regulation, distress tolerance, interpersonal effectiveness, and mindfulness skills. Meditation resources will be offered. Therapist will teach TIPP skills: Temperature, Intense exercise, Paced breathing, and Paired Muscle Relaxation.    Objective #B   Client will learn to identify negative thoughts that are present and use cbt strategies to diffuse anxiety.  Status: Continued:June 6, 2023    Intervention(s)   Therapist will teach cognitive distortion identification and coping strategies.    Objective #C   Client will engage in positive self-care.  Status: Continued: June 6, 2023     Intervention(s)   Therapist will teach self-care goals:  Maintain balance in schedule (time for self/others, relaxation/activities, leisure/tasks, home/out of the house), assert needs and set limits with others, challenge negative thoughts/use affirming and encouraging self-talk, engage with support people on regular basis, practice behavior activation behaviors (sleep hygiene, balanced eating, physical activity, medical needs, personal hygiene), acknowledge and accept your feelings.    Objective #D  Client will learn and use Love and Logic parenting skills with their children.    Status: Continued: June 6, 2023    Intervention(s)  Therapist will teach Love and Logic parenting skills.      Patient has reviewed and agreed to the above plan.    Rich Martinez MA, LMFT  June 6, 2023

## 2023-09-27 ENCOUNTER — PATIENT OUTREACH (OUTPATIENT)
Dept: CARE COORDINATION | Facility: CLINIC | Age: 53
End: 2023-09-27
Payer: COMMERCIAL

## 2023-10-03 ENCOUNTER — MYC MEDICAL ADVICE (OUTPATIENT)
Dept: PEDIATRICS | Facility: CLINIC | Age: 53
End: 2023-10-03
Payer: COMMERCIAL

## 2023-10-03 ENCOUNTER — VIRTUAL VISIT (OUTPATIENT)
Dept: PSYCHOLOGY | Facility: CLINIC | Age: 53
End: 2023-10-03
Payer: COMMERCIAL

## 2023-10-03 DIAGNOSIS — F43.22 ADJUSTMENT DISORDER WITH ANXIETY: Primary | ICD-10-CM

## 2023-10-03 PROCEDURE — 90834 PSYTX W PT 45 MINUTES: CPT | Mod: VID | Performed by: MARRIAGE & FAMILY THERAPIST

## 2023-10-04 ENCOUNTER — E-VISIT (OUTPATIENT)
Dept: PEDIATRICS | Facility: CLINIC | Age: 53
End: 2023-10-04
Payer: COMMERCIAL

## 2023-10-04 PROCEDURE — 99421 OL DIG E/M SVC 5-10 MIN: CPT | Performed by: NURSE PRACTITIONER

## 2023-10-06 ASSESSMENT — PATIENT HEALTH QUESTIONNAIRE - PHQ9
SUM OF ALL RESPONSES TO PHQ QUESTIONS 1-9: 0
5. POOR APPETITE OR OVEREATING: NOT AT ALL

## 2023-10-06 ASSESSMENT — ANXIETY QUESTIONNAIRES
6. BECOMING EASILY ANNOYED OR IRRITABLE: SEVERAL DAYS
GAD7 TOTAL SCORE: 3
GAD7 TOTAL SCORE: 3
1. FEELING NERVOUS, ANXIOUS, OR ON EDGE: SEVERAL DAYS
7. FEELING AFRAID AS IF SOMETHING AWFUL MIGHT HAPPEN: NOT AT ALL
5. BEING SO RESTLESS THAT IT IS HARD TO SIT STILL: NOT AT ALL
IF YOU CHECKED OFF ANY PROBLEMS ON THIS QUESTIONNAIRE, HOW DIFFICULT HAVE THESE PROBLEMS MADE IT FOR YOU TO DO YOUR WORK, TAKE CARE OF THINGS AT HOME, OR GET ALONG WITH OTHER PEOPLE: SOMEWHAT DIFFICULT
2. NOT BEING ABLE TO STOP OR CONTROL WORRYING: NOT AT ALL
3. WORRYING TOO MUCH ABOUT DIFFERENT THINGS: SEVERAL DAYS

## 2023-10-06 ASSESSMENT — COLUMBIA-SUICIDE SEVERITY RATING SCALE - C-SSRS
6. HAVE YOU EVER DONE ANYTHING, STARTED TO DO ANYTHING, OR PREPARED TO DO ANYTHING TO END YOUR LIFE?: NO
2. HAVE YOU ACTUALLY HAD ANY THOUGHTS OF KILLING YOURSELF?: NO
TOTAL  NUMBER OF INTERRUPTED ATTEMPTS SINCE LAST CONTACT: NO
1. SINCE LAST CONTACT, HAVE YOU WISHED YOU WERE DEAD OR WISHED YOU COULD GO TO SLEEP AND NOT WAKE UP?: NO
ATTEMPT SINCE LAST CONTACT: NO
SUICIDE, SINCE LAST CONTACT: NO
TOTAL  NUMBER OF ABORTED OR SELF INTERRUPTED ATTEMPTS SINCE LAST CONTACT: NO

## 2023-10-06 NOTE — PROGRESS NOTES
[facility]                                     Progress Note    Patient Name: Laya Zuleta  Date: October 3, 2023         Service Type: Individual      Session Start Time: 11:00  Session End Time: 11:45     Session Length: 45 min    Session #: 25    Attendees: Client attended alone    Service Modality:  In person     Treatment Plan Last Reviewed: October 3, 2023    DATA  Interactive Complexity: No  Crisis: No       Progress Since Last Session (Related to Symptoms / Goals / Homework):   Symptoms: Improving .    Homework: Achieved / completed to satisfaction with strong self-care performance.      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     Intervention:   Taught/reviewed cognitive distortion and negative self-talk identification, reality checking and coping strategies. Client reports she too often believes she is not worthy of friendship, that she does not deserve relationships and is generally not good enough. Explored the etiology of these beliefs with a strong correlation to how her father interacted with her and judged her. Processed emotions in session, validated, supportive counseling. Guidance offered to better utilize client's coping skills.    ASSESSMENT: Current Emotional / Mental Status (status of significant symptoms):   Risk status (Self / Other harm or suicidal ideation)   Patient denies current fears or concerns for personal safety.   Patient denies current or recent suicidal ideation or behaviors.   Patientdenies current  or recent homicidal ideation or behaviors.   Patient denies current or recent self injurious behavior or ideation.   Patient denies other safety concerns.   Patient reports there has been no change in risk factors since their last session.     Patientreports there has been no change in protective factors since their last session.     Recommended that patient call 911 or go to the local ED should there be a change in any of these risk factors.     Appearance:   Appropriate    Eye Contact:   Good    Psychomotor Behavior: Normal    Attitude:   Interested Ricardo   Orientation:   All   Speech    Rate / Production: Normal     Volume:  Normal    Mood:    Anxious  Sad    Affect:    Appropriate    Thought Content:  Clear    Thought Form:  Coherent  Logical    Insight:    Good      Medication Review:   No changes to current psychiatric medication(s)     Medication Compliance:   Yes     Changes in Health Issues:   None reported     Chemical Use Review:   Substance Use: Chemical use reviewed, no active concerns identified      Tobacco Use: No current tobacco use.      Diagnosis:  Adjustment disorder with anxiety    Collateral Reports Completed:   Not Applicable    PLAN: (Patient Tasks / Therapist Tasks / Other)  Client will use cognitive distortion and negative self-talk identification, reality checking and coping strategies.    Chung Martinez MA, LMFT                                                         ______________________________________________________________________                                                 Treatment Plan    Client's Name: Laya Zuleta  YOB: 1970    Date: October 3, 2023    DSM-V Diagnoses: 309.24 - Adjustment Disorder with Anxiety  ; V71.09 - No Diagnosis  Psychosocial / Contextual Factors: daughter with bone disease  WHODAS: 13    Referral / Collaboration:  Referral to another professional/service is not indicated at this time..    Anticipated number of session or  this episode of care: maintenance    Measurable Treatment Goal(s) related to diagnosis / functional impairment(s)   I will know I've met my goal when I learn tools to cope with and lower my anxiety.     Goal 1: Client will experience a significant reduction in ZAIRA-7 scores.     Objective #A   Client will learn and integrate DBT/CBT strategies to more effectively manage emotions and relationships.   Status: Continued: October 3, 2023    Intervention(s)   Therapist will teach emotional regulation, distress tolerance, interpersonal effectiveness, and mindfulness skills. Meditation resources will be offered. Therapist will teach TIPP skills: Temperature, Intense exercise, Paced breathing, and Paired Muscle Relaxation.    Objective #B   Client will learn to identify negative thoughts that are present and use cbt strategies to diffuse anxiety.  Status: Continued: October 3, 2023    Intervention(s)   Therapist will teach cognitive distortion identification and coping strategies.    Objective #C   Client will engage in positive self-care.  Status: Continued: October 3, 2023    Intervention(s)   Therapist will teach self-care goals:  Maintain balance in schedule (time for self/others, relaxation/activities, leisure/tasks, home/out of the house), assert needs and set limits with others, challenge negative thoughts/use affirming and encouraging self-talk, engage with support people on regular basis, practice behavior activation behaviors (sleep hygiene, balanced eating, physical activity, medical needs, personal hygiene), acknowledge and accept your feelings.    Objective #D  Client will learn and use Love and Logic parenting skills with their children.    Status: Continued: October 3, 2023    Intervention(s)  Therapist will teach Love and Logic parenting skills.      Patient has reviewed and agreed to the above plan.    Rich Martinez MA, LMFT  October 3, 2023

## 2023-10-12 ENCOUNTER — E-VISIT (OUTPATIENT)
Dept: PEDIATRICS | Facility: CLINIC | Age: 53
End: 2023-10-12

## 2023-10-12 DIAGNOSIS — Z87.09 HISTORY OF SINUSITIS: ICD-10-CM

## 2023-10-12 DIAGNOSIS — J01.91 ACUTE RECURRENT SINUSITIS, UNSPECIFIED LOCATION: Primary | ICD-10-CM

## 2023-10-12 DIAGNOSIS — Z86.16 HISTORY OF COVID-19: ICD-10-CM

## 2023-10-12 DIAGNOSIS — J32.0 CHRONIC MAXILLARY SINUSITIS: ICD-10-CM

## 2023-10-12 PROCEDURE — 99421 OL DIG E/M SVC 5-10 MIN: CPT | Performed by: NURSE PRACTITIONER

## 2023-10-12 NOTE — PATIENT INSTRUCTIONS
Thank you for choosing us for your care. I have placed an order for a prescription so that you can start treatment. View your full visit summary for details by clicking on the link below. Your pharmacist will able to address any questions you may have about the medication.     If you're not feeling better within 5-7 days, please schedule an appointment.  You can schedule an appointment right here in [facility], or call [phone]  If the visit is for the same symptoms as your eVisit, we'll refund the cost of your eVisit if seen within seven days.

## 2023-10-18 ENCOUNTER — ANCILLARY PROCEDURE (OUTPATIENT)
Dept: MAMMOGRAPHY | Facility: CLINIC | Age: 53
End: 2023-10-18
Attending: NURSE PRACTITIONER
Payer: COMMERCIAL

## 2023-10-18 DIAGNOSIS — Z12.31 VISIT FOR SCREENING MAMMOGRAM: ICD-10-CM

## 2023-10-18 PROCEDURE — 77067 SCR MAMMO BI INCL CAD: CPT | Mod: TC | Performed by: RADIOLOGY

## 2023-10-18 PROCEDURE — 77063 BREAST TOMOSYNTHESIS BI: CPT | Mod: TC | Performed by: RADIOLOGY

## 2023-10-23 ENCOUNTER — E-VISIT (OUTPATIENT)
Dept: URGENT CARE | Facility: CLINIC | Age: 53
End: 2023-10-23
Payer: COMMERCIAL

## 2023-10-23 DIAGNOSIS — B37.31 CANDIDAL VULVOVAGINITIS: Primary | ICD-10-CM

## 2023-10-23 PROCEDURE — 99421 OL DIG E/M SVC 5-10 MIN: CPT | Performed by: NURSE PRACTITIONER

## 2023-10-23 RX ORDER — FLUCONAZOLE 150 MG/1
150 TABLET ORAL ONCE
Qty: 1 TABLET | Refills: 0 | Status: SHIPPED | OUTPATIENT
Start: 2023-10-23 | End: 2023-10-23

## 2023-10-23 NOTE — PATIENT INSTRUCTIONS
Thank you for choosing us for your care. I have placed an order for a prescription so that you can start treatment. View your full visit summary for details by clicking on the link below. Your pharmacist will able to address any questions you may have about the medication.     If you re not feeling better within 2-3 days, please schedule an appointment.  You can schedule an appointment right here in [facility], or call [phone]  If the visit is for the same symptoms as your eVisit, we ll refund the cost of your eVisit if seen within seven days.    Vaginal Yeast Infection: Care Instructions  Overview     A vaginal yeast infection is the growth of too many yeast cells in the vagina. This is a common problem. Itching, vaginal discharge and irritation, and other symptoms can bother you. But yeast infections don't often cause other health problems.  Some medicines can increase your risk of getting a yeast infection. These include antibiotics, hormones, and steroids. You may also be more likely to get a yeast infection if you are pregnant, have diabetes, douche, or wear tight clothes.  With treatment, most yeast infections get better in a few days.  Follow-up care is a key part of your treatment and safety. Be sure to make and go to all appointments, and call your doctor if you are having problems. It's also a good idea to know your test results and keep a list of the medicines you take.  How can you care for yourself at home?  Take your medicines exactly as prescribed. Call your doctor if you think you are having a problem with your medicine.  Ask your doctor about over-the-counter (OTC) medicines for yeast infections. If you use an OTC treatment, read and follow all instructions on the label.  Don't use tampons while using a vaginal cream or suppository. The tampons can absorb the medicine. Use pads instead.  Wear loose cotton clothing. Don't wear nylon or other fabric that holds body heat and moisture close to the  "skin.  Try sleeping without underwear.  Don't scratch. Relieve itching with a cold pack or a cool bath.  Don't wash your vulva more than once a day. Use plain water or a mild, unscented soap. Air-dry the vulva.  Change out of wet or damp clothes as soon as possible.  If you are using a vaginal medicine, don't have sex until you have finished your treatment. But if you do have sex, don't depend on a condom or diaphragm for birth control. The oil in some vaginal medicines weakens latex.  Don't douche or use powders, sprays, or perfumes in your vagina or on your vulva. These items can change the normal balance of organisms in your vagina.  When should you call for help?   Call your doctor now or seek immediate medical care if:    You have new or increased pain in your vagina or pelvis.   Watch closely for changes in your health, and be sure to contact your doctor if:    You have unexpected vaginal bleeding.     You have a fever.     You are not getting better after 2 days.     Your symptoms come back after you finish your medicines.   Where can you learn more?  Go to [email or web address]  Enter F639 in the search box to learn more about \"Vaginal Yeast Infection: Care Instructions.\"  Current as of: August 2, 2022               Content Version: 13.7    4579-7178 [employer].   Care instructions adapted under license by your healthcare professional. If you have questions about a medical condition or this instruction, always ask your healthcare professional. [employer] disclaims any warranty or liability for your use of this information.      "

## 2023-10-24 ENCOUNTER — OFFICE VISIT (OUTPATIENT)
Dept: PSYCHOLOGY | Facility: CLINIC | Age: 53
End: 2023-10-24
Payer: COMMERCIAL

## 2023-10-24 DIAGNOSIS — F43.22 ADJUSTMENT DISORDER WITH ANXIETY: Primary | ICD-10-CM

## 2023-10-24 PROCEDURE — 90834 PSYTX W PT 45 MINUTES: CPT | Performed by: MARRIAGE & FAMILY THERAPIST

## 2023-10-26 ENCOUNTER — OFFICE VISIT (OUTPATIENT)
Dept: PEDIATRICS | Facility: CLINIC | Age: 53
End: 2023-10-26
Payer: COMMERCIAL

## 2023-10-26 VITALS
RESPIRATION RATE: 20 BRPM | BODY MASS INDEX: 27.51 KG/M2 | TEMPERATURE: 98.2 F | SYSTOLIC BLOOD PRESSURE: 128 MMHG | DIASTOLIC BLOOD PRESSURE: 86 MMHG | HEIGHT: 67 IN | HEART RATE: 81 BPM | OXYGEN SATURATION: 100 % | WEIGHT: 175.3 LBS

## 2023-10-26 DIAGNOSIS — Z20.822 EXPOSURE TO 2019 NOVEL CORONAVIRUS: ICD-10-CM

## 2023-10-26 DIAGNOSIS — R61 NIGHT SWEATS: ICD-10-CM

## 2023-10-26 DIAGNOSIS — R23.2 HOT FLASHES: ICD-10-CM

## 2023-10-26 DIAGNOSIS — R52 BODY ACHES: Primary | ICD-10-CM

## 2023-10-26 LAB
BASOPHILS # BLD AUTO: 0.1 10E3/UL (ref 0–0.2)
BASOPHILS NFR BLD AUTO: 2 %
EOSINOPHIL # BLD AUTO: 0.2 10E3/UL (ref 0–0.7)
EOSINOPHIL NFR BLD AUTO: 3 %
ERYTHROCYTE [DISTWIDTH] IN BLOOD BY AUTOMATED COUNT: 12.4 % (ref 10–15)
HCT VFR BLD AUTO: 44.5 % (ref 35–47)
HGB BLD-MCNC: 14.9 G/DL (ref 11.7–15.7)
IMM GRANULOCYTES # BLD: 0 10E3/UL
IMM GRANULOCYTES NFR BLD: 0 %
LYMPHOCYTES # BLD AUTO: 2.4 10E3/UL (ref 0.8–5.3)
LYMPHOCYTES NFR BLD AUTO: 36 %
MCH RBC QN AUTO: 29.3 PG (ref 26.5–33)
MCHC RBC AUTO-ENTMCNC: 33.5 G/DL (ref 31.5–36.5)
MCV RBC AUTO: 87 FL (ref 78–100)
MONOCYTES # BLD AUTO: 0.6 10E3/UL (ref 0–1.3)
MONOCYTES NFR BLD AUTO: 9 %
MONOCYTES NFR BLD AUTO: NEGATIVE %
NEUTROPHILS # BLD AUTO: 3.4 10E3/UL (ref 1.6–8.3)
NEUTROPHILS NFR BLD AUTO: 51 %
PLATELET # BLD AUTO: 326 10E3/UL (ref 150–450)
RBC # BLD AUTO: 5.09 10E6/UL (ref 3.8–5.2)
WBC # BLD AUTO: 6.6 10E3/UL (ref 4–11)

## 2023-10-26 PROCEDURE — 87635 SARS-COV-2 COVID-19 AMP PRB: CPT | Performed by: NURSE PRACTITIONER

## 2023-10-26 PROCEDURE — 86308 HETEROPHILE ANTIBODY SCREEN: CPT | Performed by: NURSE PRACTITIONER

## 2023-10-26 PROCEDURE — 99214 OFFICE O/P EST MOD 30 MIN: CPT | Performed by: NURSE PRACTITIONER

## 2023-10-26 PROCEDURE — 85025 COMPLETE CBC W/AUTO DIFF WBC: CPT | Performed by: NURSE PRACTITIONER

## 2023-10-26 PROCEDURE — 84443 ASSAY THYROID STIM HORMONE: CPT | Performed by: NURSE PRACTITIONER

## 2023-10-26 PROCEDURE — 36415 COLL VENOUS BLD VENIPUNCTURE: CPT | Performed by: NURSE PRACTITIONER

## 2023-10-26 PROCEDURE — 80053 COMPREHEN METABOLIC PANEL: CPT | Performed by: NURSE PRACTITIONER

## 2023-10-26 ASSESSMENT — PAIN SCALES - GENERAL: PAINLEVEL: NO PAIN (0)

## 2023-10-26 NOTE — PATIENT INSTRUCTIONS
We will start with a general lab work up  If you are not feeling better in the next 1 month, we can consider doing additional lab work and work up and referring to Post covid clinic

## 2023-10-26 NOTE — PROGRESS NOTES
[facility]                                     Progress Note    Patient Name: Laya Zuleta  Date: October 3, 2023         Service Type: Individual      Session Start Time: 11:00  Session End Time: 11:45     Session Length: 45 min    Session #: 25    Attendees: Client attended alone    Service Modality:  In person     Treatment Plan Last Reviewed: October 3, 2023    DATA  Interactive Complexity: No  Crisis: No       Progress Since Last Session (Related to Symptoms / Goals / Homework):   Symptoms: Improving .    Homework: Achieved / completed to satisfaction with strong self-care performance.      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     Intervention:   Taught/reviewed/role played Love and Logic parenting skills. Client reports having difficulty encouraging her daughter to be more social, despite child's mental health diagnoses. Discussed alternative approaches. Processed emotions in session, validated, supportive counseling. Guidance offered to better utilize client's coping skills.    ASSESSMENT: Current Emotional / Mental Status (status of significant symptoms):   Risk status (Self / Other harm or suicidal ideation)   Patient denies current fears or concerns for personal safety.   Patient denies current or recent suicidal ideation or behaviors.   Patientdenies current or recent homicidal ideation or behaviors.   Patient denies current or recent self injurious behavior or ideation.   Patient denies other safety concerns.   Patient reports there has been no change  in risk factors since their last session.     Patientreports there has been no change in protective factors since their last session.     Recommended that patient call 911 or go to the local ED should there be a change in any of these risk factors.     Appearance:   Appropriate    Eye Contact:   Good    Psychomotor Behavior: Normal    Attitude:   Pleasant Attentive   Orientation:   All   Speech    Rate / Production: Normal     Volume:  Normal    Mood:    Grieving   Affect:    Appropriate    Thought Content:  Clear    Thought Form:  Coherent  Logical    Insight:    Good      Medication Review:   No changes to current psychiatric medication(s)     Medication Compliance:   Yes     Changes in Health Issues:   None reported     Chemical Use Review:   Substance Use: Chemical use reviewed, no active concerns identified      Tobacco Use: No current tobacco use.      Diagnosis:  Adjustment disorder with anxiety    Collateral Reports Completed:   Not Applicable    PLAN: (Patient Tasks / Therapist Tasks / Other)  Client will use Love and Logic parenting skills with her daughter.    Chung Martinez MA, LMFT                                                         ______________________________________________________________________                                                 Treatment Plan    Client's Name: Laya Zuleta  YOB: 1970    Date: October 3, 2023    DSM-V Diagnoses: 309.24 - Adjustment Disorder with Anxiety  ; V71.09 - No Diagnosis  Psychosocial / Contextual Factors: daughter with bone disease  WHODAS: 13    Referral / Collaboration:  Referral to another professional/service is not indicated at this time..    Anticipated number of session or this episode of care: maintenance    Measurable Treatment Goal(s) related to diagnosis / functional impairment(s)   I will know I've met my goal when I learn tools to cope with and lower my anxiety.     Goal 1: Client will experience a significant  reduction in ZAIRA-7 scores.     Objective #A   Client will learn and integrate DBT/CBT strategies to more effectively manage emotions and relationships.   Status: Continued: October 3, 2023    Intervention(s)   Therapist will teach emotional regulation, distress tolerance, interpersonal effectiveness, and mindfulness skills. Meditation resources will be offered. Therapist will teach TIPP skills: Temperature, Intense exercise, Paced breathing, and Paired Muscle Relaxation.    Objective #B   Client will learn to identify negative thoughts that are present and use cbt strategies to diffuse anxiety.  Status: Continued: October 3, 2023    Intervention(s)   Therapist will teach cognitive distortion identification and coping strategies.    Objective #C   Client will engage in positive self-care.  Status: Continued: October 3, 2023    Intervention(s)   Therapist will teach self-care goals:  Maintain balance in schedule (time for self/others, relaxation/activities, leisure/tasks, home/out of the house), assert needs and set limits with others, challenge negative thoughts/use affirming and encouraging self-talk, engage with support people on regular basis, practice behavior activation behaviors (sleep hygiene, balanced eating, physical activity, medical needs, personal hygiene), acknowledge and accept your feelings.    Objective #D  Client will learn and use Love and Logic parenting skills with their children.    Status: Continued: October 3, 2023    Intervention(s)  Therapist will teach Love and Logic parenting skills.      Patient has reviewed and agreed to the above plan.    Rich Martinez MA, LMFT  October 3, 2023

## 2023-10-26 NOTE — PROGRESS NOTES
Assessment & Plan   Body aches  Night sweats  Hot flashes  Suspect pt initially had COVID 1 mos ago as her daughter tested pos and had URI -like symptoms that have since resolved but continues to have lingering body aches (mostly with exercise), night sweats and hot flashes. Possibly post-viral symptoms vs back-to-back viral illness. Will start with labs as below and monitor. If persisting next steps would be addtl work-up including CXR, UA, mono testing, etc. Follow-up in 1mos.  - Symptomatic COVID-19 Virus (Coronavirus) by PCR; Future  - CBC with platelets and differential; Future  - Comprehensive metabolic panel (BMP + Alb, Alk Phos, ALT, AST, Total. Bili, TP); Future  - TSH with free T4 reflex; Future  - Mononucleosis screen; Future  - Symptomatic COVID-19 Virus (Coronavirus) by PCR Nose  - CBC with platelets and differential  - Comprehensive metabolic panel (BMP + Alb, Alk Phos, ALT, AST, Total. Bili, TP)  - TSH with free T4 reflex  - Mononucleosis screen    Exposure to 2019 novel coronavirus  See above, close exposure 1 mos ago around onset of symptoms  - Symptomatic COVID-19 Virus (Coronavirus) by PCR; Future  - CBC with platelets and differential; Future  - Comprehensive metabolic panel (BMP + Alb, Alk Phos, ALT, AST, Total. Bili, TP); Future  - TSH with free T4 reflex; Future  - Mononucleosis screen; Future  - Symptomatic COVID-19 Virus (Coronavirus) by PCR Nose  - CBC with platelets and differential  - Comprehensive metabolic panel (BMP + Alb, Alk Phos, ALT, AST, Total. Bili, TP)  - TSH with free T4 reflex  - Mononucleosis screen             Patient Instructions   We will start with a general lab work up  If you are not feeling better in the next 1 month, we can consider doing additional lab work and work up and referring to Post covid clinic     Lesly Orozco NP  [facility] JEANETTE Asif is a 53 year old, presenting for the following health issues:  post Covid       "10/26/2023     3:50 PM   Additional Questions   Roomed by Mey Rodriguez   Accompanied by N/A         10/26/2023     3:50 PM   Patient Reported Additional Medications   Patient reports taking the following new medications No       History of Present Illness       Reason for visit:  I believe I had covid a month ago. I have hot flashes and my body aches.  I get tired and have night sweats.  Symptom onset:  3-4 weeks ago  Symptoms include:  Body aches, headaches, hot flashes  Symptom intensity:  Moderate  Symptom progression:  Staying the same  Had these symptoms before:  No  What makes it worse:  Exercising  What makes it better:  Advil    She eats 2-3 servings of fruits and vegetables daily.She consumes 0 sweetened beverage(s) daily.She exercises with enough effort to increase her heart rate 9 or less minutes per day.  She exercises with enough effort to increase her heart rate 3 or less days per week.   She is taking medications regularly.     On Wegovy so weight loss is  intentional.    Wt Readings from Last 4 Encounters:   10/26/23 79.5 kg (175 lb 4.8 oz)   04/28/23 88.6 kg (195 lb 6.4 oz)   04/24/23 88.8 kg (195 lb 12.8 oz)   12/08/22 85 kg (187 lb 6.4 oz)     About 1 mos ago had exposure to COVID (Daughter) and had congestion, achy, cough, sore throat, felt feverish that lasted about 3 days.  Congestion, cough, sore throat has completely resolved  Hx of sinus infections, took augmentin for sinusitis finished 1 week ago and sinus symptoms resolved.  Since then, she has night sweats (drenching), random hot flashes during the day at least a couple times per day, fatigued (random naps), feels body aches after exercise  No fevers, chest pain, shortness of breath, rashes  No tick bites  Review of Systems         Objective    /86 (BP Location: Right arm, Patient Position: Sitting, Cuff Size: Adult Large)   Pulse 81   Temp 98.2  F (36.8  C) (Tympanic)   Resp 20   Ht 1.707 m (5' 7.21\")   Wt 79.5 kg (175 lb 4.8 " oz)   LMP 09/15/2015 (LMP Unknown)   SpO2 100%   BMI 27.29 kg/m    Body mass index is 27.29 kg/m .  Physical Exam   GENERAL: healthy, alert and no distress  HENT: ear canals and TM's normal, nose and mouth without ulcers or lesions  NECK: no adenopathy, no asymmetry, masses, or scars and thyroid normal to palpation  RESP: lungs clear to auscultation - no rales, rhonchi or wheezes  CV: regular rate and rhythm, normal S1 S2, no S3 or S4, no murmur, click or rub, no peripheral edema and peripheral pulses strong  ABDOMEN: soft, nontender, no hepatosplenomegaly, no masses and bowel sounds normal  PSYCH: mentation appears normal, affect normal/bright

## 2023-10-27 LAB
ALBUMIN SERPL BCG-MCNC: 4.4 G/DL (ref 3.5–5.2)
ALP SERPL-CCNC: 96 U/L (ref 35–104)
ALT SERPL W P-5'-P-CCNC: 29 U/L (ref 0–50)
ANION GAP SERPL CALCULATED.3IONS-SCNC: 15 MMOL/L (ref 7–15)
AST SERPL W P-5'-P-CCNC: 28 U/L (ref 0–45)
BILIRUB SERPL-MCNC: 0.4 MG/DL
BUN SERPL-MCNC: 13.6 MG/DL (ref 6–20)
CALCIUM SERPL-MCNC: 9.9 MG/DL (ref 8.6–10)
CHLORIDE SERPL-SCNC: 101 MMOL/L (ref 98–107)
CREAT SERPL-MCNC: 0.82 MG/DL (ref 0.51–0.95)
DEPRECATED HCO3 PLAS-SCNC: 23 MMOL/L (ref 22–29)
EGFRCR SERPLBLD CKD-EPI 2021: 85 ML/MIN/1.73M2
GLUCOSE SERPL-MCNC: 82 MG/DL (ref 70–99)
POTASSIUM SERPL-SCNC: 4.4 MMOL/L (ref 3.4–5.3)
PROT SERPL-MCNC: 7.6 G/DL (ref 6.4–8.3)
SARS-COV-2 RNA RESP QL NAA+PROBE: NEGATIVE
SODIUM SERPL-SCNC: 139 MMOL/L (ref 135–145)
TSH SERPL DL<=0.005 MIU/L-ACNC: 1.54 UIU/ML (ref 0.3–4.2)

## 2023-11-03 ENCOUNTER — IMMUNIZATION (OUTPATIENT)
Dept: FAMILY MEDICINE | Facility: CLINIC | Age: 53
End: 2023-11-03
Payer: COMMERCIAL

## 2023-11-03 DIAGNOSIS — Z23 NEED FOR PROPHYLACTIC VACCINATION AND INOCULATION AGAINST INFLUENZA: Primary | ICD-10-CM

## 2023-11-03 PROCEDURE — 90682 RIV4 VACC RECOMBINANT DNA IM: CPT

## 2023-11-03 PROCEDURE — 99207 PR NO CHARGE NURSE ONLY: CPT

## 2023-11-03 PROCEDURE — 90471 IMMUNIZATION ADMIN: CPT

## 2023-11-06 ENCOUNTER — MYC REFILL (OUTPATIENT)
Dept: PEDIATRICS | Facility: CLINIC | Age: 53
End: 2023-11-06
Payer: COMMERCIAL

## 2023-11-21 ENCOUNTER — OFFICE VISIT (OUTPATIENT)
Dept: PSYCHOLOGY | Facility: CLINIC | Age: 53
End: 2023-11-21
Payer: COMMERCIAL

## 2023-11-21 DIAGNOSIS — F43.22 ADJUSTMENT DISORDER WITH ANXIETY: Primary | ICD-10-CM

## 2023-11-21 PROCEDURE — 90834 PSYTX W PT 45 MINUTES: CPT | Performed by: MARRIAGE & FAMILY THERAPIST

## 2023-11-25 NOTE — PROGRESS NOTES
[facility]                                     Progress Note    Patient Name: Laya Zuleta  Date: November 21, 2023          Service Type: Individual      Session Start Time: 11:00  Session End Time: 11:45     Session Length: 45 min    Session #: 26    Attendees: Client attended alone    Service Modality:  In person     Treatment Plan Last Reviewed: October 3, 2023    DATA  Interactive Complexity: No  Crisis: No       Progress Since Last Session (Related to Symptoms / Goals / Homework):   Symptoms: Improving .    Homework: Achieved / completed to satisfaction with strong self-care performance.      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      Intervention:   Taught/reviewed/role-played interpersonal effectiveness, communication, negotiation and boundary setting skills. Client reports she is anxious anticipating stressful family dynamics as she approaches the holiday season. Discussed acceptance strategies and boundaries. Processed emotions in session, validated, supportive counseling. Guidance offered to better utilize client's coping skills.    ASSESSMENT: Current Emotional / Mental Status (status of significant symptoms):   Risk status (Self / Other harm or suicidal ideation)   Patient denies current fears or concerns for personal safety.   Patient denies current or recent suicidal ideation or behaviors.   Patientdenies current or recent homicidal ideation or behaviors.   Patient denies current or recent self injurious behavior or  ideation.   Patient denies other safety concerns.   Patient reports there has been no change in risk factors since their last session.     Patientreports there has been no change in protective factors since their last session.     Recommended that patient call 911 or go to the local ED should there be a change in any of these risk factors.     Appearance:   Appropriate    Eye Contact:   Good    Psychomotor Behavior: Normal    Attitude:   Interested   Orientation:   All   Speech    Rate / Production: Normal     Volume:  Normal    Mood:    Anxious    Affect:    Appropriate    Thought Content:  Clear    Thought Form:  Coherent  Logical    Insight:    Good      Medication Review:   No changes to current psychiatric medication(s)     Medication Compliance:   Yes     Changes in Health Issues:   None reported     Chemical Use Review:   Substance Use: Chemical use reviewed, no active concerns identified      Tobacco Use: No current tobacco use.      Diagnosis:  Adjustment disorder with anxiety    Collateral Reports Completed:   Not Applicable    PLAN: (Patient Tasks / Therapist Tasks / Other)  Client will use interpersonal effectiveness, communication, negotiation and boundary setting skills with family during the holidays.    Chung Martinez MA, [facility]                                                         ______________________________________________________________________                                                 Treatment Plan    Client's Name: Laya Zuleta  YOB: 1970    Date: October 3, 2023    DSM-V Diagnoses: 309.24 - Adjustment Disorder with Anxiety  ; V71.09 - No Diagnosis  Psychosocial / Contextual Factors: daughter with bone disease  WHODAS: 13    Referral / Collaboration:  Referral to another professional/service is not indicated at this time..    Anticipated number of session or this episode of care: maintenance    Measurable Treatment Goal(s) related to diagnosis / functional  impairment(s)   I will know I've met my goal when I learn tools to cope with and lower my anxiety.     Goal 1: Client will experience a significant reduction in ZAIRA-7 scores.     Objective #A   Client will learn and integrate DBT/CBT strategies to more effectively manage emotions and relationships.   Status: Continued: October 3, 2023    Intervention(s)   Therapist will teach emotional regulation, distress tolerance, interpersonal effectiveness, and mindfulness skills. Meditation resources will be offered. Therapist will teach TIPP skills: Temperature, Intense exercise, Paced breathing, and Paired Muscle Relaxation.    Objective #B   Client will learn to identify negative thoughts that are present and use cbt strategies to diffuse anxiety.  Status: Continued: October 3, 2023    Intervention(s)   Therapist will teach cognitive distortion identification and coping strategies.    Objective #C   Client will engage in positive self-care.  Status: Continued: October 3, 2023    Intervention(s)   Therapist will teach self-care goals:  Maintain balance in schedule (time for self/others, relaxation/activities, leisure/tasks, home/out of the house), assert needs and set limits with others, challenge negative thoughts/use affirming and encouraging self-talk, engage with support people on regular basis, practice behavior activation behaviors (sleep hygiene, balanced eating, physical activity, medical needs, personal hygiene), acknowledge and accept your feelings.    Objective #D  Client will learn and use Love and Logic parenting skills with their children.    Status: Continued: October 3, 2023    Intervention(s)  Therapist will teach Love and Logic parenting skills.      Patient has reviewed and agreed to the above plan.    Rich Martinez MA, LMFT  October 3, 2023

## 2023-12-01 ENCOUNTER — TELEPHONE (OUTPATIENT)
Dept: PEDIATRICS | Facility: CLINIC | Age: 53
End: 2023-12-01
Payer: COMMERCIAL

## 2023-12-01 NOTE — TELEPHONE ENCOUNTER
Prior Authorization Retail Medication Request    Medication/Dose: Semaglutide-Weight Management (WEGOVY) 1.7 MG/0.75ML pen  Diagnosis and ICD code (if different than what is on RX):  BMI 29.0-29.9,adult [Z68.29]   New/renewal/insurance change PA/secondary ins. PA:  Previously Tried and Failed:    Rationale:      Insurance   Primary:   Insurance ID:      Secondary (if applicable):  Insurance ID:      Pharmacy Information (if different than what is on RX)  Name:  [facility]  Phone:  [phone]  Fax: [phone]

## 2023-12-05 ENCOUNTER — OFFICE VISIT (OUTPATIENT)
Dept: PSYCHOLOGY | Facility: CLINIC | Age: 53
End: 2023-12-05
Payer: COMMERCIAL

## 2023-12-05 DIAGNOSIS — F43.22 ADJUSTMENT DISORDER WITH ANXIETY: Primary | ICD-10-CM

## 2023-12-05 PROCEDURE — 90834 PSYTX W PT 45 MINUTES: CPT | Performed by: MARRIAGE & FAMILY THERAPIST

## 2023-12-06 NOTE — TELEPHONE ENCOUNTER
PA Initiation    Medication: SEMAGLUTIDE-WEIGHT MANAGEMENT 1.7 MG/0.75ML SC SOAJ  Insurance Company: [employer] ([facility]) - Phone [phone] Fax [phone]  Pharmacy Filling the Rx: [facility] [address] [place] [address]  Filling Pharmacy Phone: [phone]  Filling Pharmacy Fax: [phone]  Start Date: 12/6/2023

## 2023-12-06 NOTE — TELEPHONE ENCOUNTER
Prior Authorization Approval    Medication: SEMAGLUTIDE-WEIGHT MANAGEMENT 1.7 MG/0.75ML SC SOAJ  Authorization Effective Date: 12/6/2023  Authorization Expiration Date: 6/6/2024  Approved Dose/Quantity:   Reference #: [id number]   Insurance Company: [employer] ([facility]) - Phone [phone] Fax [phone]  Which Pharmacy is filling the prescription: [facility] [address] [facility], [place] - [address] [facility]  Pharmacy Notified: y  Patient Notified:  y - pharmacy to notify

## 2023-12-08 NOTE — PROGRESS NOTES
[facility]                                     Progress Note    Patient Name: Laya Zuleta  Date: December 5, 2023           Service Type: Individual      Session Start Time: 11:00  Session End Time: 11:45     Session Length: 45 min    Session #: 27    Attendees: Client attended alone    Service Modality:  In person     Treatment Plan Last Reviewed: October 3, 2023    DATA  Interactive Complexity: No  Crisis: No       Progress Since Last Session (Related to Symptoms / Goals / Homework):   Symptoms: Improving .    Homework: Achieved / completed to satisfaction with strong self-care performance.      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      Intervention:   Taught/reviewed/role-played interpersonal effectiveness, communication, negotiation and boundary setting skills. Client reports concern over her insecurity surrounding relationships and friendships. Began discussing origins. Processed emotions in session, validated, supportive counseling. Guidance offered to better utilize client's coping skills.    ASSESSMENT: Current Emotional / Mental Status (status of significant symptoms):   Risk status (Self / Other harm or suicidal ideation)   Patient denies current fears or concerns for personal safety.   Patient denies current or recent suicidal ideation or behaviors.   Patientdenies current or recent homicidal ideation or behaviors.   Patient denies current or recent self injurious behavior or ideation.   Patient denies other safety  concerns.   Patient reports there has been no change in risk factors since their last session.     Patientreports there has been no change in protective factors since their last session.     Recommended that patient call 911 or go to the local ED should there be a change in any of these risk factors.     Appearance:   Appropriate    Eye Contact:   Good    Psychomotor Behavior: Normal    Attitude:   Pleasant Attentive   Orientation:   All   Speech    Rate / Production: Normal     Volume:  Normal    Mood:    Anxious    Affect:    Appropriate    Thought Content:  Clear    Thought Form:  Coherent  Logical    Insight:    Good      Medication Review:   No changes to current psychiatric medication(s)     Medication Compliance:   Yes     Changes in Health Issues:   None reported     Chemical Use Review:   Substance Use: Chemical use reviewed, no active concerns identified      Tobacco Use: No current tobacco use.      Diagnosis:  Adjustment disorder with anxiety    Collateral Reports Completed:   Not Applicable    PLAN: (Patient Tasks / Therapist Tasks / Other)  Client will use interpersonal effectiveness, communication, negotiation and boundary setting skills with family during the holidays. She will note her memories and thoughts related to relationship insecurities.      Chung Martinez MA, LMFT                                                         ______________________________________________________________________                                                 Treatment Plan    Client's Name: Laya Zuleta  YOB: 1970    Date: October 3, 2023    DSM-V Diagnoses: 309.24 - Adjustment Disorder with Anxiety  ; V71.09 - No Diagnosis  Psychosocial / Contextual Factors: daughter with bone disease  WHODAS: 13    Referral / Collaboration:  Referral to another professional/service is not indicated at this time..    Anticipated number of session or this episode of care: maintenance    Measurable  Treatment Goal(s) related to diagnosis / functional impairment(s)   I will know I've met my goal when I learn tools to cope with and lower my anxiety.     Goal 1: Client will experience a significant reduction in ZAIRA-7 scores.     Objective #A   Client will learn and integrate DBT/CBT strategies to more effectively manage emotions and relationships.   Status: Continued: October 3, 2023    Intervention(s)   Therapist will teach emotional regulation, distress tolerance, interpersonal effectiveness, and mindfulness skills. Meditation resources will be offered. Therapist will teach TIPP skills: Temperature, Intense exercise, Paced breathing, and Paired Muscle Relaxation.    Objective #B   Client will learn to identify negative thoughts that are present and use cbt strategies to diffuse anxiety.  Status: Continued: October 3, 2023    Intervention(s)   Therapist will teach cognitive distortion identification and coping strategies.    Objective #C   Client will engage in positive self-care.  Status: Continued: October 3, 2023    Intervention(s)   Therapist will teach self-care goals:  Maintain balance in schedule (time for self/others, relaxation/activities, leisure/tasks, home/out of the house), assert needs and set limits with others, challenge negative thoughts/use affirming and encouraging self-talk, engage with support people on regular basis, practice behavior activation behaviors (sleep hygiene, balanced eating, physical activity, medical needs, personal hygiene), acknowledge and accept your feelings.    Objective #D  Client will learn and use Love and Logic parenting skills with their children.    Status: Continued: October 3, 2023    Intervention(s)  Therapist will teach Love and Logic parenting skills.      Patient has reviewed and agreed to the above plan.    Rich Martinez MA, LMFT  October 3, 2023

## 2023-12-20 ASSESSMENT — ENCOUNTER SYMPTOMS
FEVER: 0
HEMATOCHEZIA: 0
WEAKNESS: 0
FREQUENCY: 0
ARTHRALGIAS: 0
CHILLS: 0
PARESTHESIAS: 0
ABDOMINAL PAIN: 0
JOINT SWELLING: 0
SORE THROAT: 0
HEARTBURN: 0
NAUSEA: 0
EYE PAIN: 0
NERVOUS/ANXIOUS: 0
DIZZINESS: 0
HEMATURIA: 0
HEADACHES: 0
PALPITATIONS: 0
SHORTNESS OF BREATH: 0
COUGH: 0
DIARRHEA: 0
BREAST MASS: 0
CONSTIPATION: 0
MYALGIAS: 0
DYSURIA: 0

## 2023-12-20 NOTE — COMMUNITY RESOURCES LIST (ENGLISH)
12/20/2023   [facility]  N/A  For questions about this resource list or additional care needs, please contact your primary care clinic or care manager.  Phone: [phone]   Email: N/A   Address: [facility] [place] [address]   Hours: N/A        Hotlines and Helplines       Hotline - Housing crisis  1  [facility] (Main Office) Distance: 7.25 miles      Phone/Virtual   [address] [place] [address]  Language: English  Hours: Mon - Sun Open 24 Hours   Phone: (143) 811-7695 Email: info@[facility] Website: http://[facility].[employer] Buildings     2  [facility] Distance: 11.73 miles      Phone/Virtual   [address] [place] [address]  Language: English  Hours: Mon - Sun Open 24 Hours   Phone: (461) 188-7403 Email: communications@[facility].org Website: https://hospitals-[place].org/oursaviourshousing/          Housing       Coordinated Entry access point  3  [facility] Distance: 8.64 miles      In-Person, Phone/Virtual   [address] [place] [address]  Language: English  Hours: Mon - Fri 8:30 AM - 4:30 PM  Fees: Free   Phone: (492) 205-7814 Email: info@[facility].org Website: https://www.[facility].org/locations/[facility]/     4  [facility] ([facility] - Housing Services Distance: 11.75 miles      In-Person   [address] [place] [address]  Language: English  Hours: Mon - Fri 9:00 AM - 5:00 PM  Fees: Free   Phone: (358) 819-5534 Email: housing@[facility].org Website: http://www.[facility].org/housing     Drop-in center or day shelter  5  [facility] Distance: 9.69 miles      In-Person   [address] [place] [address]  Language: English  Hours: Mon - Fri 9:00 AM - 4:00 PM  Fees: Free   Phone: (592) 425-2195 Email: gonzalez@[employer].org Website: http://listeninghouse.org     6  [facility] Distance: 12.1 miles       In-Person   [address] [place] [address]  Language: English, Afghan, Portuguese  Hours: Mon - Sat 7:00 AM - 3:00 PM  Fees: Free, Self Pay   Phone: (538) 290-8072 Email: info@[employer] Website: https://www.[employer]./locations/opportunity-center/     Housing search assistance  7  [facility] Distance: 4.58 miles      In-Person   [address] [place] [address]  Language: English  Hours: Mon - Fri 8:00 AM - 4:00 PM  Fees: Free, Insurance, Sliding Fee   Phone: (316) 188-3731 Email: wanda@[facility] Website: https://www.[employer]./HealthFamily/Disabilities     8  [facility] - Online Housing Search Assistance Distance: 6.28 miles      Phone/Virtual [address] [place] [address]  Language: English  Hours: Mon - Sun Open 24 Hours  Fees: Free   Phone: (230) 781-3935 Email: kike@[employer] Website: https://Petsy.[employer]./     Shelter for families  9  [facility] Distance: 0.97 miles      In-Person   [address] [place] [address]  Language: English  Hours: Mon - Sun Open 24 Hours  Fees: Free, Sliding Fee   Phone: (662) 673-3715 Ext.1 Email: info@[facility].[employer] Website: http://www.[facility] Center[facility].[employer].     10  [facility] Distance: 23.97 miles      In-Person   [address] [place] [address]  Language: English  Hours: Mon - Fri 3:00 PM - 9:00 AM , Sat - Sun Open 24 Hours  Fees: Free   Phone: (737) 495-7898 Ext.1 Website: https://www.saint[facility]s.org/2020/07/03/emergency-family-shelter/     Shelter for individuals  11  [facility]) [facility] Distance: 5.85 miles      In-Person   [address] [place] [address]  Language: English, Portuguese  Hours: Mon - Fri 8:00 AM - 4:30 PM  Fees: Free   Phone: (367) 763-7488 Email: info@[employer].org Website: http://www.capagency.org     12   [facility] [place] [facility] [place] [facility] Distance: 8.64 miles      In-Person   [address] [place] [address]  Language: English  Hours: Mon - Sun 5:00 PM - 10:00 AM  Fees: Free, Self Pay   Phone: (164) 106-2970 Email: info@[employer] Website: https://www.[employer]/locations/[facility]/          Important Numbers & Websites       Emergency Services   911  [facility] Services   311  Poison Control   (417) 145-1012  Suicide Prevention Lifeline   (790) 269-1028 (TALK)  Child Abuse Hotline   (831) 649-2235 (4-A-Child)  Sexual Assault Hotline   (676) 757-7520 (HOPE)  National Runaway Safeline   (456) 361-3356 (RUNAWAY)  All-Options Talkline   (103) 409-7490  Substance Abuse Referral   (132) 909-8077 (HELP)

## 2023-12-21 ENCOUNTER — OFFICE VISIT (OUTPATIENT)
Dept: PEDIATRICS | Facility: CLINIC | Age: 53
End: 2023-12-21
Payer: COMMERCIAL

## 2023-12-21 VITALS
DIASTOLIC BLOOD PRESSURE: 78 MMHG | SYSTOLIC BLOOD PRESSURE: 122 MMHG | HEART RATE: 86 BPM | OXYGEN SATURATION: 96 % | RESPIRATION RATE: 16 BRPM | WEIGHT: 174.9 LBS | HEIGHT: 68 IN | TEMPERATURE: 98 F | BODY MASS INDEX: 26.51 KG/M2

## 2023-12-21 DIAGNOSIS — Z00.00 ROUTINE GENERAL MEDICAL EXAMINATION AT A HEALTH CARE FACILITY: Primary | ICD-10-CM

## 2023-12-21 DIAGNOSIS — R73.01 IMPAIRED FASTING GLUCOSE: ICD-10-CM

## 2023-12-21 LAB — HBA1C MFR BLD: 5 % (ref 0–5.6)

## 2023-12-21 PROCEDURE — 99213 OFFICE O/P EST LOW 20 MIN: CPT | Mod: 25 | Performed by: NURSE PRACTITIONER

## 2023-12-21 PROCEDURE — 36415 COLL VENOUS BLD VENIPUNCTURE: CPT | Performed by: NURSE PRACTITIONER

## 2023-12-21 PROCEDURE — 83036 HEMOGLOBIN GLYCOSYLATED A1C: CPT | Performed by: NURSE PRACTITIONER

## 2023-12-21 PROCEDURE — 80061 LIPID PANEL: CPT | Performed by: NURSE PRACTITIONER

## 2023-12-21 PROCEDURE — 99396 PREV VISIT EST AGE 40-64: CPT | Performed by: NURSE PRACTITIONER

## 2023-12-21 RX ORDER — BUPROPION HYDROCHLORIDE 300 MG/1
300 TABLET ORAL EVERY MORNING
Qty: 90 TABLET | Refills: 3 | Status: SHIPPED | OUTPATIENT
Start: 2023-12-21

## 2023-12-21 ASSESSMENT — ENCOUNTER SYMPTOMS
CHILLS: 0
SORE THROAT: 0
DIARRHEA: 0
SHORTNESS OF BREATH: 0
NAUSEA: 0
BREAST MASS: 0
COUGH: 0
HEMATOCHEZIA: 0
PARESTHESIAS: 0
HEARTBURN: 0
FEVER: 0
HEMATURIA: 0
ABDOMINAL PAIN: 0
WEAKNESS: 0
EYE PAIN: 0
CONSTIPATION: 0
MYALGIAS: 0
DIZZINESS: 0
ARTHRALGIAS: 0
DYSURIA: 0
PALPITATIONS: 0
HEADACHES: 0
NERVOUS/ANXIOUS: 0
JOINT SWELLING: 0
FREQUENCY: 0

## 2023-12-21 ASSESSMENT — PAIN SCALES - GENERAL: PAINLEVEL: NO PAIN (0)

## 2023-12-21 NOTE — COMMUNITY RESOURCES LIST (ENGLISH)
12/21/2023   [facility]  N/A  For additional resource needs, please contact your health insurance member services or your primary care team.  Phone: [phone]   Email: N/A   Address: [address] [place] [address]   Hours: N/A        Hotlines and Helplines       Hotline - Housing crisis  1  [facility] (Main Office) Distance: 7.25 miles      Phone/Virtual   [address] [place] [address]  Language: English  Hours: Mon - Sun Open 24 Hours   Phone: (377) 658-7785 Email: info@[facility] Website: http://[facility] [facility].[employer] Athletechs     2  [facility] Distance: 11.73 miles      Phone/Virtual   [address] [place] [address]  Language: English  Hours: Mon - Sun Open 24 Hours   Phone: (540) 133-9462 Email: communications@[facility].org Website: https://[facility]-[place].org/oursaviourshousing/          Housing       Coordinated Entry access point  3  [facility] Distance: 8.64 miles      In-Person, Phone/Virtual   [address] [place] [address]  Language: English  Hours: Mon - Fri 8:30 AM - 4:30 PM  Fees: Free   Phone: (658) 633-7475 Email: info@[facility].org Website: https://www.[facility].org/locations/[facility]/     4  [facility] ([facility] - Housing Services Distance: 11.75 miles      In-Person   [address] [place] [address]  Language: English  Hours: Mon - Fri 9:00 AM - 5:00 PM  Fees: Free   Phone: (142) 402-6190 Email: housing@[facility].org Website: http://www.[facility].org/housing     Drop-in center or day shelter  5  [facility] Distance: 9.69 miles      In-Person   [address] [place] [address]  Language: English  Hours: Mon - Fri 9:00 AM - 4:00 PM  Fees: Free   Phone: (499) 409-9759 Email: adriennek@[employer].org Website: http://listeninghouse.org     6  [facility] [place] [facility] [place] [facility] Distance: 12.1 miles       In-Person   [address] [place] [address]  Language: English, Turkmen, Citizen of the [place]  Hours: Mon - Sat 7:00 AM - 3:00 PM  Fees: Free, Self Pay   Phone: (453) 145-2315 Email: info@[employer] Website: https://www.[employer]/locations/opportunity-center/     Housing search assistance  7  [employer] - https://eBureau/ Distance: 12.79 miles      Phone/Virtual   [address] [place] [address]  Language: English  Hours: Mon - Sun Open 24 Hours   Email: info@[employer] Website: https://eBureau     8  [employer] - Online housing search assistance Distance: 13.6 miles      Phone/Virtual   [address] [address]  Language: English, Hmong, Turkmen, Citizen of the [place]  Hours: Mon - Sun Open 24 Hours   Phone: (245) 821-7664 Email: info@[employer].org Website: http://www.housinglink.org/     Shelter for families  9  [facility] Distance: 0.97 miles      In-Person   [address] [place] [address]  Language: English  Hours: Mon - Sun Open 24 Hours  Fees: Free, Sliding Fee   Phone: (824) 377-9553 Ext.1 Email: info@[facility].[employer] Website: http://www.[facility].org     10  [facility] Distance: 23.97 miles      In-Person   [address] [place] [address]  Language: English  Hours: Mon - Fri 3:00 PM - 9:00 AM , Sat - Sun Open 24 Hours  Fees: Free   Phone: (358) 293-5390 Ext.1 Website: https://www.saintandrews.org/2020/07/03/emergency-family-shelter/     Shelter for individuals  11  [facility] ([facility])  Nato Tijerina  [facility] Distance: 5.85 miles      In-Person   [address] [place] [address]  Language: English, Citizen of the [place]  Hours: Mon - Fri 8:00 AM - 4:30 PM  Fees: Free   Phone: (264) 954-8388 Email: info@capagency.org Website: http://www.capagency.org     12  [facility] and [place] - [facility] [place] [facility] Distance: 8.64 miles      In-Person   [address] [place] [address]  Language: English  Hours: Mon - Sun 5:00 PM - 10:00 AM  Fees: Free, Self Pay   Phone: (686) 538-7748 Email: info@[employer] Website: https://www.peerTransfer.[employer]Wisdom/locations/[facility]-saint-paul/          Important Numbers & Websites       [address]  Poison Control   (611) 987-5067 [place]po[place].org  Suicide and Crisis Lifeline   [address] [place], [place] [address]line.org  Childhelp [place] Child Abuse Hotline   485.253.3704 Childhelphotline.org  National Sexual Assault Hotline   (132) 257-7952 (HOPE) [facility] Runaway Safeline   (460) 844-3104 (RUNAWAY) [facility].org  Pregnancy & Postpartum Support [place]   Call/text 793-613-6847 Ppsupportmn.org  Substance Abuse National Helpline ([facility]   076-324-HELP (8710) Findtreatment.gov  Emergency Services   911

## 2023-12-21 NOTE — PATIENT INSTRUCTIONS
Brooke is great downstairs, especially the females.     Preventive Health Recommendations  Female Ages 50 - 64    Yearly exam: See your health care provider every year in order to  Review health changes.   Discuss preventive care.    Review your medicines if your doctor has prescribed any.    Get a Pap test every three years (unless you have an abnormal result and your provider advises testing more often).  If you get Pap tests with HPV test, you only need to test every 5 years, unless you have an abnormal result.   You do not need a Pap test if your uterus was removed (hysterectomy) and you have not had cancer.  You should be tested each year for STDs (sexually transmitted diseases) if you're at risk.   Have a mammogram every 1 to 2 years.  Have a colonoscopy at age 45, or have a yearly FIT test (stool test). These exams screen for colon cancer.    Have a cholesterol test every 5 years, or more often if advised.  Have a diabetes test (fasting glucose) every three years. If you are at risk for diabetes, you should have this test more often.   If you are at risk for osteoporosis (brittle bone disease), think about having a bone density scan (DEXA).    Shots: Get a flu shot each year. Get a tetanus shot every 10 years.    Nutrition:   Eat at least 5 servings of fruits and vegetables each day.  Eat whole-grain bread, whole-wheat pasta and brown rice instead of white grains and rice.  Get adequate Calcium and Vitamin D.     Lifestyle  Exercise at least 150 minutes a week (30 minutes a day, 5 days a week). This will help you control your weight and prevent disease.  Limit alcohol to one drink per day.  No smoking.   Wear sunscreen to prevent skin cancer.   See your dentist every six months for an exam and cleaning.  See your eye doctor every 1 to 2 years.

## 2023-12-21 NOTE — PROGRESS NOTES
SUBJECTIVE:   Laya is a 53 year old, presenting for the following:  Physical        2023    11:16 AM   Additional Questions   Roomed by Alexa Schwarz CMA       Healthy Habits:     Getting at least 3 servings of Calcium per day:  NO    Bi-annual eye exam:  Yes    Dental care twice a year:  Yes    Sleep apnea or symptoms of sleep apnea:  None    Diet:  Regular (no restrictions)    Frequency of exercise:  2-3 days/week    Duration of exercise:  15-30 minutes    Taking medications regularly:  Yes    Medication side effects:  Not applicable    Additional concerns today:  No    Concerns today: discuss Wegovy  NOT fasting today        -------------------------------------    Social History     Tobacco Use    Smoking status: Never    Smokeless tobacco: Never   Substance Use Topics    Alcohol use: Yes     Comment: 2 times per week, 2 per time             2023    10:05 AM   Alcohol Use   Prescreen: >3 drinks/day or >7 drinks/week? No     Reviewed orders with patient.  Reviewed health maintenance and updated orders accordingly - Yes  Lab work is in process    Breast Cancer Screenin/28/2021     8:19 AM 10/15/2021    12:05 PM   Breast CA Risk Assessment (FHS-7)   Do you have a family history of breast, colon, or ovarian cancer? Yes No / Unknown       click delete button to remove this line now  Mammogram Screening: Recommended annual mammography  Pertinent mammograms are reviewed under the imaging tab.    History of abnormal Pap smear: NO - age 30-65 PAP every 5 years with negative HPV co-testing recommended      Latest Ref Rng & Units 2022    11:55 AM 2017     1:46 PM 2017    11:48 AM   PAP / HPV   PAP  Negative for Intraepithelial Lesion or Malignancy (NILM)      PAP (Historical)    NIL    HPV 16 DNA Negative Negative  Negative     HPV 18 DNA Negative Negative  Negative     Other HR HPV Negative Negative  Negative       Reviewed and updated as needed this visit by clinical  "staff   Tobacco  Allergies  Meds              Reviewed and updated as needed this visit by Provider                     Review of Systems   Constitutional:  Negative for chills and fever.   HENT:  Negative for congestion, ear pain, hearing loss and sore throat.    Eyes:  Negative for pain and visual disturbance.   Respiratory:  Negative for cough and shortness of breath.    Cardiovascular:  Negative for chest pain, palpitations and peripheral edema.   Gastrointestinal:  Negative for abdominal pain, constipation, diarrhea, heartburn, hematochezia and nausea.   Breasts:  Negative for tenderness, breast mass and discharge.   Genitourinary:  Negative for dysuria, frequency, genital sores, hematuria, pelvic pain, urgency, vaginal bleeding and vaginal discharge.   Musculoskeletal:  Negative for arthralgias, joint swelling and myalgias.   Skin:  Negative for rash.   Neurological:  Negative for dizziness, weakness, headaches and paresthesias.   Psychiatric/Behavioral:  Negative for mood changes. The patient is not nervous/anxious.           OBJECTIVE:   /78 (BP Location: Right arm, Cuff Size: Adult Regular)   Pulse 86   Temp 98  F (36.7  C) (Tympanic)   Resp 16   Ht 1.715 m (5' 7.5\")   Wt 79.3 kg (174 lb 14.4 oz)   LMP 09/15/2015 (LMP Unknown)   SpO2 96%   BMI 26.99 kg/m    Physical Exam  GENERAL: healthy, alert and no distress  EYES: Eyes grossly normal to inspection, PERRL and conjunctivae and sclerae normal  HENT: ear canals and TM's normal, nose and mouth without ulcers or lesions  NECK: no adenopathy, no asymmetry, masses, or scars and thyroid normal to palpation  RESP: lungs clear to auscultation - no rales, rhonchi or wheezes  BREAST: normal without masses, tenderness or nipple discharge and no palpable axillary masses or adenopathy  CV: regular rate and rhythm, normal S1 S2, no S3 or S4, no murmur, click or rub, no peripheral edema and peripheral pulses strong  ABDOMEN: soft, nontender, no " "hepatosplenomegaly, no masses and bowel sounds normal  MS: no gross musculoskeletal defects noted, no edema  SKIN: no suspicious lesions or rashes  NEURO: Normal strength and tone, mentation intact and speech normal  PSYCH: mentation appears normal, affect normal/bright    Diagnostic Test Results:  Labs reviewed in Epic    ASSESSMENT/PLAN:   (Z00.00) Routine general medical examination at a health care facility  (primary encounter diagnosis)  Plan: Lipid panel reflex to direct LDL Fasting,         Hemoglobin A1c  -Will get covid vaccine once it's been 3 months since having covid    (Z68.29) BMI 29.0-29.9,adult  Comment: With impaired fasting glucose. Continues to lose weight. She prefers Saxenda because she can skip a dose if she has a special dinner outing planned, but Wegovy is more available at her pharmacy. Has been on 1.7mg since Monday.   Plan: Semaglutide-Weight Management (WEGOVY) 1.7         MG/0.75ML pen, buPROPion (WELLBUTRIN XL) 300 MG        24 hr tablet          -Ok to continue, would consider backing off on dose once BMI is under 25  -Reinforced protein intake and exercise to maintain muscle mass  -Discussed duration of therapy is unknown but possibly long term  -Follow-up 6 months via phone visit          COUNSELING:  Reviewed preventive health counseling, as reflected in patient instructions      BMI:   Estimated body mass index is 26.99 kg/m  as calculated from the following:    Height as of this encounter: 1.715 m (5' 7.5\").    Weight as of this encounter: 79.3 kg (174 lb 14.4 oz).   Weight management plan: Discussed healthy diet and exercise guidelines      She reports that she has never smoked. She has never used smokeless tobacco.          FLORENCE JACOME CNP  [facility] JEANETTE  "

## 2023-12-22 LAB
CHOLEST SERPL-MCNC: 250 MG/DL
FASTING STATUS PATIENT QL REPORTED: NO
HDLC SERPL-MCNC: 51 MG/DL
LDLC SERPL CALC-MCNC: 177 MG/DL
NONHDLC SERPL-MCNC: 199 MG/DL
TRIGL SERPL-MCNC: 112 MG/DL

## 2024-01-02 ENCOUNTER — ALLIED HEALTH/NURSE VISIT (OUTPATIENT)
Dept: PEDIATRICS | Facility: CLINIC | Age: 54
End: 2024-01-02
Payer: COMMERCIAL

## 2024-01-02 ENCOUNTER — OFFICE VISIT (OUTPATIENT)
Dept: PSYCHOLOGY | Facility: CLINIC | Age: 54
End: 2024-01-02
Payer: COMMERCIAL

## 2024-01-02 DIAGNOSIS — Z23 NEED FOR COVID-19 VACCINE: Primary | ICD-10-CM

## 2024-01-02 DIAGNOSIS — F43.22 ADJUSTMENT DISORDER WITH ANXIETY: Primary | ICD-10-CM

## 2024-01-02 PROCEDURE — 90480 ADMN SARSCOV2 VAC 1/ONLY CMP: CPT

## 2024-01-02 PROCEDURE — 91320 SARSCV2 VAC 30MCG TRS-SUC IM: CPT

## 2024-01-02 PROCEDURE — 90834 PSYTX W PT 45 MINUTES: CPT | Performed by: MARRIAGE & FAMILY THERAPIST

## 2024-01-02 PROCEDURE — 99207 PR NO CHARGE NURSE ONLY: CPT

## 2024-01-04 NOTE — PROGRESS NOTES
[facility]                                     Progress Note    Patient Name: Laya Zuleta  Date: 1/02/24           Service Type: Individual      Session Start Time: 11:00  Session End Time: 11:45     Session Length: 45 min    Session #: 28    Attendees: Client attended alone    Service Modality:  In person     Treatment Plan Last Reviewed: October 3, 2023    DATA  Interactive Complexity: No  Crisis: No       Progress Since Last Session (Related to Symptoms / Goals / Homework):   Symptoms: No change .    Homework: Achieved / completed to satisfaction with strong self-care performance.      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      Intervention:   Taught/reviewed distress tolerance: (ACCEPTS)  skills and strategies for daily use. Client reports a holiday season in which several events and gatherings were uncomfortable and ones she does not want to repeat. Debriefed regarding events and assertive options for each in the future. Processed emotions in session, validated, supportive counseling. Guidance offered to better utilize client's coping skills.    ASSESSMENT: Current Emotional / Mental Status (status of significant symptoms):   Risk status (Self / Other harm or suicidal ideation)   Patient denies current fears or concerns for personal safety.   Patient denies current or recent suicidal ideation or behaviors.   Patientdenies current or recent homicidal ideation or behaviors.   Patient denies current or recent self injurious  behavior or ideation.   Patient denies other safety concerns.   Patient reports there has been no change in risk factors since their last session.     Patientreports there has been no change in protective factors since their last session.     Recommended that patient call 911 or go to the local ED should there be a change in any of these risk factors.     Appearance:   Appropriate    Eye Contact:   Good    Psychomotor Behavior: Normal    Attitude:   Interested   Orientation:   All   Speech    Rate / Production: Normal     Volume:  Normal    Mood:    Normal   Affect:    Appropriate    Thought Content:  Clear    Thought Form:  Coherent  Logical    Insight:    Good      Medication Review:   No changes to current psychiatric medication(s)     Medication Compliance:   Yes     Changes in Health Issues:   None reported     Chemical Use Review:   Substance Use: Chemical use reviewed, no active concerns identified      Tobacco Use: No current tobacco use.      Diagnosis:  Adjustment disorder with anxiety    Collateral Reports Completed:   Not Applicable    PLAN: (Patient Tasks / Therapist Tasks / Other)  Client will use distress tolerance: (ACCEPTS)  skills and strategies for daily use. She will note her memories and thoughts related to relationship insecurities.      Chung Martinez MA, LMFT                                                         ______________________________________________________________________                                                 Treatment Plan    Client's Name: Laya Zuleta  YOB: 1970    Date: October 3, 2023    DSM-V Diagnoses: 309.24 - Adjustment Disorder with Anxiety  ; V71.09 - No Diagnosis  Psychosocial / Contextual Factors: daughter with bone disease  WHODAS: 13    Referral / Collaboration:  Referral to another professional/service is not indicated at this time..    Anticipated number of session or this episode of care: maintenance    Measurable Treatment  Goal(s) related to diagnosis / functional impairment(s)   I will know I've met my goal when I learn tools to cope with and lower my anxiety.     Goal 1: Client will experience a significant reduction in ZAIRA-7 scores.     Objective #A   Client will learn and integrate DBT/CBT strategies to more effectively manage emotions and relationships.   Status: Continued: October 3, 2023    Intervention(s)   Therapist will teach emotional regulation, distress tolerance, interpersonal effectiveness, and mindfulness skills. Meditation resources will be offered. Therapist will teach TIPP skills: Temperature, Intense exercise, Paced breathing, and Paired Muscle Relaxation.    Objective #B   Client will learn to identify negative thoughts that are present and use cbt strategies to diffuse anxiety.  Status: Continued: October 3, 2023    Intervention(s)   Therapist will teach cognitive distortion identification and coping strategies.    Objective #C   Client will engage in positive self-care.  Status: Continued: October 3, 2023    Intervention(s)   Therapist will teach self-care goals:  Maintain balance in schedule (time for self/others, relaxation/activities, leisure/tasks, home/out of the house), assert needs and set limits with others, challenge negative thoughts/use affirming and encouraging self-talk, engage with support people on regular basis, practice behavior activation behaviors (sleep hygiene, balanced eating, physical activity, medical needs, personal hygiene), acknowledge and accept your feelings.    Objective #D  Client will learn and use Love and Logic parenting skills with their children.    Status: Continued: October 3, 2023    Intervention(s)  Therapist will teach Love and Logic parenting skills.      Patient has reviewed and agreed to the above plan.    Rich Martinez MA, LMFT  October 3, 2023
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
## 2024-01-08 ENCOUNTER — THERAPY VISIT (OUTPATIENT)
Dept: PHYSICAL THERAPY | Facility: CLINIC | Age: 54
End: 2024-01-08
Payer: COMMERCIAL

## 2024-01-08 DIAGNOSIS — M54.12 RIGHT CERVICAL RADICULOPATHY: Primary | ICD-10-CM

## 2024-01-08 PROCEDURE — 97161 PT EVAL LOW COMPLEX 20 MIN: CPT | Mod: GP | Performed by: PHYSICAL THERAPIST

## 2024-01-08 PROCEDURE — 97110 THERAPEUTIC EXERCISES: CPT | Mod: GP | Performed by: PHYSICAL THERAPIST

## 2024-01-08 PROCEDURE — 97140 MANUAL THERAPY 1/> REGIONS: CPT | Mod: GP | Performed by: PHYSICAL THERAPIST

## 2024-01-10 ENCOUNTER — TRANSCRIBE ORDERS (OUTPATIENT)
Dept: OTHER | Age: 54
End: 2024-01-10

## 2024-01-16 ENCOUNTER — OFFICE VISIT (OUTPATIENT)
Dept: PSYCHOLOGY | Facility: CLINIC | Age: 54
End: 2024-01-16
Payer: COMMERCIAL

## 2024-01-16 DIAGNOSIS — F43.22 ADJUSTMENT DISORDER WITH ANXIETY: Primary | ICD-10-CM

## 2024-01-16 PROCEDURE — 90834 PSYTX W PT 45 MINUTES: CPT | Performed by: MARRIAGE & FAMILY THERAPIST

## 2024-01-18 ENCOUNTER — THERAPY VISIT (OUTPATIENT)
Dept: PHYSICAL THERAPY | Facility: CLINIC | Age: 54
End: 2024-01-18
Payer: COMMERCIAL

## 2024-01-18 DIAGNOSIS — M54.12 RIGHT CERVICAL RADICULOPATHY: Primary | ICD-10-CM

## 2024-01-18 PROCEDURE — 97110 THERAPEUTIC EXERCISES: CPT | Mod: GP | Performed by: PHYSICAL THERAPIST

## 2024-01-18 PROCEDURE — 97140 MANUAL THERAPY 1/> REGIONS: CPT | Mod: GP | Performed by: PHYSICAL THERAPIST

## 2024-01-19 ASSESSMENT — COLUMBIA-SUICIDE SEVERITY RATING SCALE - C-SSRS
2. HAVE YOU ACTUALLY HAD ANY THOUGHTS OF KILLING YOURSELF?: NO
TOTAL  NUMBER OF ABORTED OR SELF INTERRUPTED ATTEMPTS SINCE LAST CONTACT: NO
1. SINCE LAST CONTACT, HAVE YOU WISHED YOU WERE DEAD OR WISHED YOU COULD GO TO SLEEP AND NOT WAKE UP?: NO
6. HAVE YOU EVER DONE ANYTHING, STARTED TO DO ANYTHING, OR PREPARED TO DO ANYTHING TO END YOUR LIFE?: NO
TOTAL  NUMBER OF INTERRUPTED ATTEMPTS SINCE LAST CONTACT: NO
ATTEMPT SINCE LAST CONTACT: NO
SUICIDE, SINCE LAST CONTACT: NO

## 2024-01-19 ASSESSMENT — ANXIETY QUESTIONNAIRES
6. BECOMING EASILY ANNOYED OR IRRITABLE: SEVERAL DAYS
IF YOU CHECKED OFF ANY PROBLEMS ON THIS QUESTIONNAIRE, HOW DIFFICULT HAVE THESE PROBLEMS MADE IT FOR YOU TO DO YOUR WORK, TAKE CARE OF THINGS AT HOME, OR GET ALONG WITH OTHER PEOPLE: SOMEWHAT DIFFICULT
1. FEELING NERVOUS, ANXIOUS, OR ON EDGE: SEVERAL DAYS
GAD7 TOTAL SCORE: 3
GAD7 TOTAL SCORE: 3
3. WORRYING TOO MUCH ABOUT DIFFERENT THINGS: SEVERAL DAYS
5. BEING SO RESTLESS THAT IT IS HARD TO SIT STILL: NOT AT ALL
7. FEELING AFRAID AS IF SOMETHING AWFUL MIGHT HAPPEN: NOT AT ALL
2. NOT BEING ABLE TO STOP OR CONTROL WORRYING: NOT AT ALL

## 2024-01-19 ASSESSMENT — PATIENT HEALTH QUESTIONNAIRE - PHQ9
5. POOR APPETITE OR OVEREATING: NOT AT ALL
SUM OF ALL RESPONSES TO PHQ QUESTIONS 1-9: 0
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
## 2024-01-22 ENCOUNTER — E-VISIT (OUTPATIENT)
Dept: PEDIATRICS | Facility: CLINIC | Age: 54
End: 2024-01-22
Payer: COMMERCIAL

## 2024-01-22 DIAGNOSIS — J32.9 SINUSITIS, UNSPECIFIED CHRONICITY, UNSPECIFIED LOCATION: Primary | ICD-10-CM

## 2024-01-22 PROCEDURE — 99421 OL DIG E/M SVC 5-10 MIN: CPT | Performed by: NURSE PRACTITIONER

## 2024-01-22 NOTE — PATIENT INSTRUCTIONS
Thank you for choosing us for your care. I have placed an order for a prescription so that you can start treatment. View your full visit summary for details by clicking on the link below. Your pharmacist will able to address any questions you may have about the medication.     If you're not feeling better within 5-7 days, please schedule an appointment.  You can schedule an appointment right here in [facility], or call [phone]  If the visit is for the same symptoms as your eVisit, we'll refund the cost of your eVisit if seen within seven days.

## 2024-02-01 ENCOUNTER — THERAPY VISIT (OUTPATIENT)
Dept: PHYSICAL THERAPY | Facility: CLINIC | Age: 54
End: 2024-02-01
Payer: COMMERCIAL

## 2024-02-01 DIAGNOSIS — M54.12 RIGHT CERVICAL RADICULOPATHY: Primary | ICD-10-CM

## 2024-02-01 PROCEDURE — 97110 THERAPEUTIC EXERCISES: CPT | Mod: GP | Performed by: PHYSICAL THERAPIST

## 2024-02-01 PROCEDURE — 97140 MANUAL THERAPY 1/> REGIONS: CPT | Mod: GP | Performed by: PHYSICAL THERAPIST

## 2024-02-08 ENCOUNTER — TRANSFERRED RECORDS (OUTPATIENT)
Dept: HEALTH INFORMATION MANAGEMENT | Facility: CLINIC | Age: 54
End: 2024-02-08
Payer: COMMERCIAL

## 2024-02-15 ENCOUNTER — THERAPY VISIT (OUTPATIENT)
Dept: PHYSICAL THERAPY | Facility: CLINIC | Age: 54
End: 2024-02-15
Payer: COMMERCIAL

## 2024-02-15 DIAGNOSIS — M54.12 RIGHT CERVICAL RADICULOPATHY: Primary | ICD-10-CM

## 2024-02-15 PROCEDURE — 97110 THERAPEUTIC EXERCISES: CPT | Mod: GP | Performed by: PHYSICAL THERAPIST

## 2024-02-15 PROCEDURE — 97140 MANUAL THERAPY 1/> REGIONS: CPT | Mod: GP | Performed by: PHYSICAL THERAPIST

## 2024-02-16 ENCOUNTER — MYC MEDICAL ADVICE (OUTPATIENT)
Dept: PEDIATRICS | Facility: CLINIC | Age: 54
End: 2024-02-16
Payer: COMMERCIAL

## 2024-02-16 DIAGNOSIS — R73.01 IMPAIRED FASTING GLUCOSE: ICD-10-CM

## 2024-02-16 NOTE — TELEPHONE ENCOUNTER
Suzanna,    Please see  message and advise regarding Wegovy dosing    Harsh Polo RN on 2/16/2024 at 2:02 PM

## 2024-02-21 NOTE — TELEPHONE ENCOUNTER
Wt Readings from Last 4 Encounters:   12/21/23 79.3 kg (174 lb 14.4 oz)   10/26/23 79.5 kg (175 lb 4.8 oz)   04/28/23 88.6 kg (195 lb 6.4 oz)   04/24/23 88.8 kg (195 lb 12.8 oz)       BMI 26.99

## 2024-02-27 ENCOUNTER — OFFICE VISIT (OUTPATIENT)
Dept: PSYCHOLOGY | Facility: CLINIC | Age: 54
End: 2024-02-27
Payer: COMMERCIAL

## 2024-02-27 DIAGNOSIS — F43.22 ADJUSTMENT DISORDER WITH ANXIETY: Primary | ICD-10-CM

## 2024-02-27 PROCEDURE — 90834 PSYTX W PT 45 MINUTES: CPT | Performed by: MARRIAGE & FAMILY THERAPIST

## 2024-02-29 ENCOUNTER — THERAPY VISIT (OUTPATIENT)
Dept: PHYSICAL THERAPY | Facility: CLINIC | Age: 54
End: 2024-02-29
Payer: COMMERCIAL

## 2024-02-29 DIAGNOSIS — M54.12 RIGHT CERVICAL RADICULOPATHY: Primary | ICD-10-CM

## 2024-02-29 PROCEDURE — 97110 THERAPEUTIC EXERCISES: CPT | Mod: GP | Performed by: PHYSICAL THERAPIST

## 2024-02-29 PROCEDURE — 97140 MANUAL THERAPY 1/> REGIONS: CPT | Mod: GP | Performed by: PHYSICAL THERAPIST
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
## 2024-02-29 NOTE — PROGRESS NOTES
PLAN  Date extension to allow for increased time due to slow progress. Pt making progress towards goals.    Beginning/End Dates of Progress Note Reporting Period:  1/8/24 to 02/29/2024    Referring Provider:  Kaitlin Devine     02/29/24 0500   Appointment Info   Signing clinician's name / credentials Evette Rice, PT, DPT, CLTGABY   Total/Authorized Visits 8   Visits Used 5   Medical Diagnosis cervical radiculopathy, M54.12   PT Tx Diagnosis cervical radiculopathy   Progress Note/Certification   Onset of illness/injury or Date of Surgery 04/01/23   Therapy Frequency 1-2x/month   Predicted Duration 2 months   Progress Note Due Date 02/29/24   Progress Note Completed Date 04/29/24   GOALS   PT Goals 2;3;4   PT Goal 1   Goal Identifier HEP   Goal Description Pt will be independent in HEP to address impairments, reduce pain and improve function   Goal Progress performing HEP daily   Target Date 02/05/24   Date Met 02/15/24   PT Goal 2   Goal Identifier Pain   Goal Description Pt will be able to return to prior activity level and exercise without pain   Goal Progress pain remaining low (1-3/10) with all normal activities   Target Date 4/29/24   Subjective Report   Subjective Report Pt reports she is going to PT for her hip as well right now. Pain: 1-2/10   Objective Measures   Objective Measures Objective Measure 1   Objective Measure 1   Objective Measure Cervical AROM   Details Flx: 75% pain in R neck and scapula, Ext: 25% pain through neck, Rot R: 75%, L: 50%   Treatment Interventions (PT)   Interventions Therapeutic Procedure/Exercise;Manual Therapy;Neuromuscular Re-education;Therapeutic Activity   Therapeutic Procedure/Exercise   Therapeutic Procedures: strength, endurance, ROM, flexibillity minutes (70446) 10   Ther Proc 1 UBE   PTRx Ther Proc 1 Levator Scapulae Stretch   PTRx Ther Proc 1 - Details 2 x 30 sec hold B   PTRx Ther Proc 2 Cervical Retraction   PTRx Ther Proc 2 - Details x 10 reps- good  performance    PTRx Ther Proc 3 Thread the Needle   PTRx Ther Proc 3 - Details HEP- daily for pain relief   PTRx Ther Proc 4 Barney and Arrow Stretch   PTRx Ther Proc 4 - Details HEP for pain relief    PTRx Ther Proc 5 All 4s Cat Cow   PTRx Ther Proc 5 - Details HEP daily for pain relief   PTRx Ther Proc 6 Pec Stretch Doorway   PTRx Ther Proc 6 - Details HEP   PTRx Ther Proc 7 Upper Cervical Strengthening Extension   PTRx Ther Proc 7 - Details x 10 reps- cues to decrease thoracic/lumbar spine extension during to reduce pain in low back   PTRx Ther Proc 8 Supine Cervical Retraction   PTRx Ther Proc 8 - Details Attempted lift- felt slight pain and discomfort - increased SCM activation    Skilled Intervention Progression of HEP for cervical and scapular strengthening along with mobility to reduce pain, improve function and return to PLOF   Patient Response/Progress no pain but did not centralize today with repeated cervical retraction   Therapeutic Activity   PTRx Ther Act 1 Manual Cervical Traction   PTRx Ther Act 1 - Details Do as needed for pain relief if symptoms are worsening   Therapeutic Activities: dynamic activities to improve functional performance minutes (02714) 2   Neuromuscular Re-education   Neuromuscular re-ed of mvmt, balance, coord, kinesthetic sense, posture, proprioception minutes (53965) 3   PTRx Neuro Re-ed 1 Prone Arm Raise #1   PTRx Neuro Re-ed 1 - Details 10 x 5 sec hold- able to do 2 sets at home   Manual Therapy   Manual Therapy: Mobilization, MFR, MLD, friction massage minutes (79992) 24   Manual Therapy Manual Therapy 2   Manual Therapy 1 Manual cervical traction   Manual Therapy 1 - Details 60 sec x 4 reps   Manual Therapy 2 STM   Manual Therapy 2 - Details prone STM to R levator scapulae; supine STM to R upper trapezius   Patient Response/Progress tender but felt relief afterwards   Education   Learner/Method Patient;Pictures/Video   Plan   Home program PTRx   Updates to plan of care date  extension to follow up in 1 month   Plan for next session see how self-management for x1 month went, progression of strengthening as needed   Total Session Time   Timed Code Treatment Minutes 39   Total Treatment Time (sum of timed and untimed services) 39

## 2024-03-03 ENCOUNTER — E-VISIT (OUTPATIENT)
Dept: URGENT CARE | Facility: CLINIC | Age: 54
End: 2024-03-03
Payer: COMMERCIAL

## 2024-03-03 DIAGNOSIS — R21 RASH AND NONSPECIFIC SKIN ERUPTION: Primary | ICD-10-CM

## 2024-03-03 PROCEDURE — 99207 PR NON-BILLABLE SERV PER CHARTING: CPT | Performed by: PHYSICIAN ASSISTANT

## 2024-03-03 NOTE — PATIENT INSTRUCTIONS
Dear Laya Zuleta,    We are sorry you are not feeling well. Based on the responses you provided, it is recommended that you be seen in-person in urgent care so we can better evaluate your symptoms. Please click here to find the nearest urgent care location to you.   You will not be charged for this Visit. Thank you for trusting us with your care.    Layla Cheatham PA-C

## 2024-03-04 ENCOUNTER — OFFICE VISIT (OUTPATIENT)
Dept: INTERNAL MEDICINE | Facility: CLINIC | Age: 54
End: 2024-03-04
Payer: COMMERCIAL

## 2024-03-04 VITALS
DIASTOLIC BLOOD PRESSURE: 78 MMHG | BODY MASS INDEX: 25.52 KG/M2 | WEIGHT: 168.4 LBS | SYSTOLIC BLOOD PRESSURE: 118 MMHG | HEIGHT: 68 IN | OXYGEN SATURATION: 100 % | HEART RATE: 79 BPM | RESPIRATION RATE: 16 BRPM

## 2024-03-04 DIAGNOSIS — K13.0 ANGULAR CHEILITIS: Primary | ICD-10-CM

## 2024-03-04 PROCEDURE — 99213 OFFICE O/P EST LOW 20 MIN: CPT | Performed by: PHYSICIAN ASSISTANT

## 2024-03-04 RX ORDER — TRIAMCINOLONE ACETONIDE 1 MG/G
OINTMENT TOPICAL 2 TIMES DAILY
Qty: 15 G | Refills: 0 | Status: SHIPPED | OUTPATIENT
Start: 2024-03-04 | End: 2024-04-05

## 2024-03-04 NOTE — PROGRESS NOTES
"  Assessment & Plan     Angular cheilitis    - triamcinolone (KENALOG) 0.1 % external ointment; Apply topically 2 times daily                  Subjective   Laya is a 53 year old, presenting for the following health issues:  Mouth/Lip Problem    History of Present Illness       Reason for visit:  Redness corners of mouth  Symptom onset:  More than a month  Symptom intensity:  Moderate  Symptom progression:  Worsening  Had these symptoms before:  No  What makes it worse:  No  What makes it better:  No    She eats 2-3 servings of fruits and vegetables daily.She consumes 0 sweetened beverage(s) daily.She exercises with enough effort to increase her heart rate 10 to 19 minutes per day.  She exercises with enough effort to increase her heart rate 3 or less days per week.   She is taking medications regularly.     Does not wear dentures/partials  Has tried a number of lip treatment products and nothing seems to be helping                 Objective    /78   Pulse 79   Resp 16   Ht 1.715 m (5' 7.5\")   Wt 76.4 kg (168 lb 6.4 oz)   LMP 09/15/2015 (LMP Unknown)   SpO2 100%   BMI 25.99 kg/m    Body mass index is 25.99 kg/m .  Physical Exam   GENERAL: alert and no distress  HENT: normal cephalic/atraumatic, oral mucous membranes moist, and   Corners of the mouth with redness and flakey skin noted             Signed Electronically by: Kalie Holland PA-C    "

## 2024-03-12 ENCOUNTER — OFFICE VISIT (OUTPATIENT)
Dept: PSYCHOLOGY | Facility: CLINIC | Age: 54
End: 2024-03-12
Payer: COMMERCIAL

## 2024-03-12 DIAGNOSIS — F43.22 ADJUSTMENT DISORDER WITH ANXIETY: Primary | ICD-10-CM

## 2024-03-12 PROCEDURE — 90834 PSYTX W PT 45 MINUTES: CPT | Performed by: MARRIAGE & FAMILY THERAPIST

## 2024-03-14 NOTE — PROGRESS NOTES
[facility]                                     Progress Note    Patient Name: Laya Zuleta  Date: 3/12/24           Service Type: Individual      Session Start Time: 11:02  Session End Time: 11:50     Session Length: 48 min    Session #: 31    Attendees: Client attended alone    Service Modality:  In person     Treatment Plan Last Reviewed: January 16, 2024     DATA  Interactive Complexity: No  Crisis: No       Progress Since Last Session (Related to Symptoms / Goals / Homework):   Symptoms: No change .    Homework: Achieved / completed to satisfaction with strong self-care performance.      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      Intervention:   Taught/reviewed/role-played interpersonal effectiveness, communication, negotiation and boundary setting skills. Client reports her step-mother responded well to client's suggestion that they not relay to client's sister her 's online activity. She says she impulsively invited a couple to join their couple's group getaway without checking in with the other attendees. Chain analyses of successful use of skills to repair with other attendees. Processed emotions in session, validated, supportive counseling. Guidance offered to better utilize client's coping skills.    ASSESSMENT: Current Emotional / Mental Status (status of significant symptoms):   Risk status (Self / Other harm or suicidal ideation)   Patient denies current fears or concerns for personal safety.   Patient denies current  or recent suicidal ideation or behaviors.   Patientdenies current or recent homicidal ideation or behaviors.   Patient denies current or recent self injurious behavior or ideation.   Patient denies other safety concerns.   Patient reports there has been no change in risk factors since their last session.     Patientreports there has been no change in protective factors since their last session.     Recommended that patient call 911 or go to the local ED should there be a change in any of these risk factors.     Appearance:   Appropriate    Eye Contact:   Good    Psychomotor Behavior: Normal    Attitude:   Pleasant   Orientation:   All   Speech    Rate / Production: Normal     Volume:  Normal    Mood:    Anxious    Affect:    Appropriate    Thought Content:  Clear    Thought Form:  Coherent  Logical    Insight:    Good      Medication Review:   No changes to current psychiatric medication(s)     Medication Compliance:   Yes     Changes in Health Issues:   None reported     Chemical Use Review:   Substance Use: Chemical use reviewed, no active concerns identified      Tobacco Use: No current tobacco use.      Diagnosis:  Adjustment disorder with anxiety    Collateral Reports Completed:   Not Applicable    PLAN: (Patient Tasks / Therapist Tasks / Other)  Client will use interpersonal effectiveness, communication, negotiation and boundary setting skills with family and friends.      Chung Martinez MA, LMFT                                                         ______________________________________________________________________                                                 Treatment Plan    Client's Name: Laya Zuleta  YOB: 1970    Date: January 16, 2024    DSM-V Diagnoses: 309.24 - Adjustment Disorder with Anxiety  ; V71.09 - No Diagnosis  Psychosocial / Contextual Factors: daughter with bone disease  WHODAS: 13    Referral / Collaboration:  Referral to another professional/service is not  indicated at this time..    Anticipated number of session or this episode of care: maintenance    Measurable Treatment Goal(s) related to diagnosis / functional impairment(s)   I will know I've met my goal when I learn tools to cope with and lower my anxiety.     Goal 1: Client will experience a significant reduction in ZAIRA-7 scores.     Objective #A   Client will learn and integrate DBT/CBT strategies to more effectively manage emotions and relationships.   Status: Continued: January 16, 2024    Intervention(s)   Therapist will teach emotional regulation, distress tolerance, interpersonal effectiveness, and mindfulness skills. Meditation resources will be offered. Therapist will teach TIPP skills: Temperature, Intense exercise, Paced breathing, and Paired Muscle Relaxation.    Objective #B   Client will learn to identify negative thoughts that are present and use cbt strategies to diffuse anxiety.  Status: Continued: January 16, 2024    Intervention(s)   Therapist will teach cognitive distortion identification and coping strategies.    Objective #C   Client will engage in positive self-care.  Status: Continued: January 16, 2024    Intervention(s)   Therapist will teach self-care goals:  Maintain balance in schedule (time for self/others, relaxation/activities, leisure/tasks, home/out of the house), assert needs and set limits with others, challenge negative thoughts/use affirming and encouraging self-talk, engage with support people on regular basis, practice behavior activation behaviors (sleep hygiene, balanced eating, physical activity, medical needs, personal hygiene), acknowledge and accept your feelings.    Objective #D  Client will learn and use Love and Logic parenting skills with their children.    Status: Continued: January 16, 2024    Intervention(s)  Therapist will teach Love and Logic parenting skills.      Patient has reviewed and agreed to the above plan.    Rich Martinez MA, LMFT  January 16, 2024

## 2024-04-05 ENCOUNTER — OFFICE VISIT (OUTPATIENT)
Dept: PEDIATRICS | Facility: CLINIC | Age: 54
End: 2024-04-05
Payer: COMMERCIAL

## 2024-04-05 VITALS
DIASTOLIC BLOOD PRESSURE: 76 MMHG | HEART RATE: 85 BPM | BODY MASS INDEX: 25.01 KG/M2 | WEIGHT: 165 LBS | RESPIRATION RATE: 18 BRPM | HEIGHT: 68 IN | OXYGEN SATURATION: 98 % | TEMPERATURE: 97.5 F | SYSTOLIC BLOOD PRESSURE: 110 MMHG

## 2024-04-05 DIAGNOSIS — Z12.11 SCREEN FOR COLON CANCER: Primary | ICD-10-CM

## 2024-04-05 DIAGNOSIS — M79.622 LEFT AXILLARY PAIN: ICD-10-CM

## 2024-04-05 PROCEDURE — 99214 OFFICE O/P EST MOD 30 MIN: CPT | Performed by: NURSE PRACTITIONER

## 2024-04-05 ASSESSMENT — PAIN SCALES - GENERAL: PAINLEVEL: MILD PAIN (2)

## 2024-04-05 NOTE — PROGRESS NOTES
Assessment & Plan     Screen for colon cancer  Has colonoscopy scheduled.     Left axillary pain  Reports an intermittent left axillary ache/burn for a few months. Denies any triggers. No abnormalities on exam. Recent 3D mammogram was normal, and no epitrochlear lymph nodes to suggest breast cancer. No unintentional weight loss, fevers, fatigue, night sweats, or cough to suggest lymphoma. I suspect this is some upper thoracic radiculopathy. States she had cervical injections, but I wonder if there are some T1 issues going on. Pt concerned about about lymphoma as her sister had lymphoma. Pt has no concerning sx which we discussed. Discussed that early lymphoma isn't typically associated with lab changes  - US Axillary Left; Future  -Trial gabapentin to see if that improves sx  -TRAE signed for recent MRI to assess for T1 changes  -If not resolved we could follow-up to discuss differentials and further workup/treatment                  Reyes Asif is a 53 year old, presenting for the following health issues:  Armpit pain      4/5/2024    10:19 AM   Additional Questions   Roomed by Mey Rodriguez   Accompanied by N/A         4/5/2024    10:19 AM   Patient Reported Additional Medications   Patient reports taking the following new medications No     History of Present Illness       Reason for visit:  Pain in left armpit    She eats 2-3 servings of fruits and vegetables daily.She consumes 0 sweetened beverage(s) daily.She exercises with enough effort to increase her heart rate 20 to 29 minutes per day.  She exercises with enough effort to increase her heart rate 3 or less days per week.   She is taking medications regularly.         Pain History:  When did you first notice your pain? Few month   Have you seen anyone else for your pain? No  How has your pain affected your ability to work? Pain does not limit ability to work   What type of work do you or did you do?    Where in your body do you  "have pain?  Armpit              Objective    /76 (BP Location: Right arm, Patient Position: Sitting, Cuff Size: Adult Regular)   Pulse 85   Temp 97.5  F (36.4  C) (Tympanic)   Resp 18   Ht 1.718 m (5' 7.64\")   Wt 74.8 kg (165 lb)   LMP 09/15/2015 (LMP Unknown)   SpO2 98%   BMI 25.36 kg/m    Body mass index is 25.36 kg/m .  Physical Exam   CONSTITUTIONAL: Alert, well-nourished, well-groomed, NAD  Neck: No lymphadenopathy or masses. Thyroid smooth, non-tender, and non-enlarged.  AXILLARY: NO mass, no lymphadenopathy neck, axilla, epitrochlear. No skin changes.       Signed Electronically by: FLORENCE JACOME CNP    "

## 2024-04-11 ENCOUNTER — THERAPY VISIT (OUTPATIENT)
Dept: PHYSICAL THERAPY | Facility: CLINIC | Age: 54
End: 2024-04-11
Payer: COMMERCIAL

## 2024-04-11 DIAGNOSIS — M54.12 RIGHT CERVICAL RADICULOPATHY: Primary | ICD-10-CM

## 2024-04-11 PROCEDURE — 97110 THERAPEUTIC EXERCISES: CPT | Mod: GP | Performed by: PHYSICAL THERAPIST

## 2024-04-11 PROCEDURE — 97140 MANUAL THERAPY 1/> REGIONS: CPT | Mod: GP | Performed by: PHYSICAL THERAPIST

## 2024-04-18 ENCOUNTER — HOSPITAL ENCOUNTER (OUTPATIENT)
Dept: ULTRASOUND IMAGING | Facility: CLINIC | Age: 54
Discharge: HOME | End: 2024-04-18
Attending: NURSE PRACTITIONER
Payer: COMMERCIAL

## 2024-04-18 ENCOUNTER — HOSPITAL ENCOUNTER (OUTPATIENT)
Dept: MAMMOGRAPHY | Facility: CLINIC | Age: 54
Discharge: HOME | End: 2024-04-18
Attending: NURSE PRACTITIONER
Payer: COMMERCIAL

## 2024-04-18 DIAGNOSIS — M79.622 LEFT AXILLARY PAIN: ICD-10-CM

## 2024-04-18 PROCEDURE — 76882 US LMTD JT/FCL EVL NVASC XTR: CPT | Mod: LT

## 2024-04-18 PROCEDURE — 77065 DX MAMMO INCL CAD UNI: CPT | Mod: LT

## 2024-04-23 ENCOUNTER — OFFICE VISIT (OUTPATIENT)
Dept: PSYCHOLOGY | Facility: CLINIC | Age: 54
End: 2024-04-23
Payer: COMMERCIAL

## 2024-04-23 DIAGNOSIS — F43.22 ADJUSTMENT DISORDER WITH ANXIETY: Primary | ICD-10-CM

## 2024-04-23 PROCEDURE — 90834 PSYTX W PT 45 MINUTES: CPT | Performed by: MARRIAGE & FAMILY THERAPIST

## 2024-04-25 ASSESSMENT — ANXIETY QUESTIONNAIRES
1. FEELING NERVOUS, ANXIOUS, OR ON EDGE: SEVERAL DAYS
7. FEELING AFRAID AS IF SOMETHING AWFUL MIGHT HAPPEN: NOT AT ALL
3. WORRYING TOO MUCH ABOUT DIFFERENT THINGS: SEVERAL DAYS
GAD7 TOTAL SCORE: 2
GAD7 TOTAL SCORE: 2
IF YOU CHECKED OFF ANY PROBLEMS ON THIS QUESTIONNAIRE, HOW DIFFICULT HAVE THESE PROBLEMS MADE IT FOR YOU TO DO YOUR WORK, TAKE CARE OF THINGS AT HOME, OR GET ALONG WITH OTHER PEOPLE: SOMEWHAT DIFFICULT
6. BECOMING EASILY ANNOYED OR IRRITABLE: NOT AT ALL
5. BEING SO RESTLESS THAT IT IS HARD TO SIT STILL: NOT AT ALL
2. NOT BEING ABLE TO STOP OR CONTROL WORRYING: NOT AT ALL

## 2024-04-25 ASSESSMENT — COLUMBIA-SUICIDE SEVERITY RATING SCALE - C-SSRS
ATTEMPT LIFETIME: NO
1. HAVE YOU WISHED YOU WERE DEAD OR WISHED YOU COULD GO TO SLEEP AND NOT WAKE UP?: NO
TOTAL  NUMBER OF INTERRUPTED ATTEMPTS LIFETIME: NO
TOTAL  NUMBER OF ABORTED OR SELF INTERRUPTED ATTEMPTS LIFETIME: NO
2. HAVE YOU ACTUALLY HAD ANY THOUGHTS OF KILLING YOURSELF?: NO
6. HAVE YOU EVER DONE ANYTHING, STARTED TO DO ANYTHING, OR PREPARED TO DO ANYTHING TO END YOUR LIFE?: NO

## 2024-04-25 ASSESSMENT — PATIENT HEALTH QUESTIONNAIRE - PHQ9
SUM OF ALL RESPONSES TO PHQ QUESTIONS 1-9: 0
5. POOR APPETITE OR OVEREATING: NOT AT ALL

## 2024-04-25 NOTE — PROGRESS NOTES
[facility]                                     Progress Note    Patient Name: Laya Zuleta  Date: 4/23/24           Service Type: Individual      Session Start Time: 11:00  Session End Time: 11:45     Session Length: 45 min    Session #: 32    Attendees: Client attended alone    Service Modality:  In person     Treatment Plan Last Reviewed: April 23, 2024      DATA  Interactive Complexity: No  Crisis: No       Progress Since Last Session (Related to Symptoms / Goals / Homework):   Symptoms: No change .    Homework: Achieved / completed to satisfaction with strong self-care performance.      Episode of Care Goals: Satisfactory progress - ACTION (Actively working towards change); Intervened by reinforcing change plan / affirming steps taken     Current / Ongoing Stressors and Concerns:   Family boundaries issues   Daughter's bone disease and mental health diagnoses   Relationship conflict   Family of origin conflict     Treatment Objective(s) Addressed in This Session:   Client will increase understanding of steps in the grief process and strategies to cope with a loss.      Intervention:   Taught/reviewed grief process and coping strategies, including strong self-care behaviors.. Client reports she is struggling with the loss of a beloved pet dog of many years. Processed emotions in session, validated, supportive counseling. Guidance offered to better utilize client's coping skills.    ASSESSMENT: Current Emotional / Mental Status (status of significant symptoms):   Risk status (Self / Other harm or suicidal ideation)   Patient denies current fears or concerns for personal safety.   Patient denies current or recent suicidal ideation or behaviors.   Patientdenies current or recent homicidal ideation or behaviors.   Patient denies current or recent self injurious behavior or ideation.   Patient denies other safety concerns.   Patient reports there has been no change in risk factors  since their last session.     Patientreports there has been no change in protective factors since their last session.     Recommended that patient call 911 or go to the local ED should there be a change in any of these risk factors.     Appearance:   Appropriate    Eye Contact:   Good    Psychomotor Behavior: Normal    Attitude:   Interested   Orientation:   All   Speech    Rate / Production: Normal     Volume:  Normal    Mood:    Grieving   Affect:    Appropriate    Thought Content:  Clear    Thought Form:  Coherent  Logical    Insight:    Good      Medication Review:   No changes to current psychiatric medication(s)     Medication Compliance:   Yes     Changes in Health Issues:   None reported     Chemical Use Review:   Substance Use: Chemical use reviewed, no active concerns identified      Tobacco Use: No current tobacco use.      Diagnosis:  Adjustment disorder with anxiety    Collateral Reports Completed:   Not Applicable    PLAN: (Patient Tasks / Therapist Tasks / Other)  Client will use grief coping strategies, including strong self-care behaviors.      Chung Martinez MA, LMFT                                                         ______________________________________________________________________                                                 Treatment Plan    Client's Name: Laya Zuleta  YOB: 1970    Date: April 23, 2024    DSM-V Diagnoses: 309.24 - Adjustment Disorder with Anxiety  ; V71.09 - No Diagnosis  Psychosocial / Contextual Factors: daughter with bone disease  WHODAS: 13    Referral / Collaboration:  Referral to another professional/service is not indicated at this time..    Anticipated number of session or this episode of care: maintenance    Measurable Treatment Goal(s) related to diagnosis / functional impairment(s)   I will know I've met my goal when I learn tools to cope with and lower my anxiety.     Goal 1: Client will experience a significant reduction in  ZAIRA-7 scores.     Objective #A   Client will learn and integrate DBT/CBT strategies to more effectively manage emotions and relationships.   Status: Continued: April 23, 2024    Intervention(s)   Therapist will teach emotional regulation, distress tolerance, interpersonal effectiveness, and mindfulness skills. Meditation resources will be offered. Therapist will teach TIPP skills: Temperature, Intense exercise, Paced breathing, and Paired Muscle Relaxation.    Objective #B   Client will learn to identify negative thoughts that are present and use cbt strategies to diffuse anxiety.  Status: Continued: April 23, 2024    Intervention(s)   Therapist will teach cognitive distortion identification and coping strategies.    Objective #C   Client will engage in positive self-care.  Status: Continued: April 23, 2024    Intervention(s)   Therapist will teach self-care goals:  Maintain balance in schedule (time for self/others, relaxation/activities, leisure/tasks, home/out of the house), assert needs and set limits with others, challenge negative thoughts/use affirming and encouraging self-talk, engage with support people on regular basis, practice behavior activation behaviors (sleep hygiene, balanced eating, physical activity, medical needs, personal hygiene), acknowledge and accept your feelings.    Objective #D  Client will learn and use Love and Logic parenting skills with their children.    Status: Continued: April 23, 2024    Intervention(s)  Therapist will teach Love and Logic parenting skills.      Patient has reviewed and agreed to the above plan.    Rich Martinez MA, LMFT  April 23, 2024

## 2024-05-01 ENCOUNTER — TRANSFERRED RECORDS (OUTPATIENT)
Dept: HEALTH INFORMATION MANAGEMENT | Facility: CLINIC | Age: 54
End: 2024-05-01
Payer: COMMERCIAL

## 2024-05-01 RX ORDER — SEMAGLUTIDE 1.7 MG/.75ML
INJECTION, SOLUTION SUBCUTANEOUS
Qty: 4 ML | Refills: 0 | Status: SHIPPED | OUTPATIENT
Start: 2024-05-01 | End: 2024-05-26

## 2024-05-01 NOTE — TELEPHONE ENCOUNTER
Patient had physical with [facility] on 4/5/24. Did you mean 6 months from then or asap?  Alexa Schwarz, CMA

## 2024-05-01 NOTE — TELEPHONE ENCOUNTER
Ah I see that date now. Needs it 6 months from that date, but can wait to schedule. Please disregard

## 2024-05-02 NOTE — TELEPHONE ENCOUNTER
Sent patient remind to schedule Phone Visit for in October. Closing encounter per provider.    Thank you kindly,  Jann ALFARO

## 2024-05-14 ENCOUNTER — THERAPY VISIT (OUTPATIENT)
Dept: PHYSICAL THERAPY | Facility: CLINIC | Age: 54
End: 2024-05-14
Payer: COMMERCIAL

## 2024-05-14 DIAGNOSIS — M54.12 RIGHT CERVICAL RADICULOPATHY: Primary | ICD-10-CM

## 2024-05-14 PROCEDURE — 97110 THERAPEUTIC EXERCISES: CPT | Mod: GP | Performed by: PHYSICAL THERAPIST

## 2024-05-14 NOTE — PROGRESS NOTES
05/14/24 0500   Appointment Info   Signing clinician's name / credentials Yarelis Florez, PT   Total/Authorized Visits 8   Visits Used 7   Medical Diagnosis cervical radiculopathy, M54.12   PT Tx Diagnosis cervical radiculopathy   Progress Note/Certification   Onset of illness/injury or Date of Surgery 04/01/23   Therapy Frequency 1-2x/month   Predicted Duration 2 months   Progress Note Due Date 04/29/24   Progress Note Completed Date 02/29/24   GOALS   PT Goals 2;3;4   PT Goal 1   Goal Identifier HEP   Goal Description Pt will be independent in HEP to address impairments, reduce pain and improve function   Goal Progress performing HEP daily   Target Date 02/05/24   Date Met 02/15/24   PT Goal 2   Goal Identifier Pain   Goal Description Pt will be able to return to prior activity level and exercise without pain   Goal Progress R scapular area pain 1x/wk, 2-3/10. Goal is ongoing, but not met at this time. Pt will continue with HEP   Target Date 04/29/24   Subjective Report   Subjective Report Pain is about the same since last visit on 4/11/2024. Pain right upper scapular region and occurs about 1x/week. Has been doing the exercises. Some soreness today after doing some gardening activities in past few days. Feels like she is ready to continue independently with her HEP. Also had US and mammogram for left axilla pain was having, and both were negative.   Objective Measures   Objective Measures Objective Measure 1   Objective Measure 1   Objective Measure Cervical AROM   Details Flx: 80% discomfort at base of neck, Ext: 50% with end range pain, Rot B: 80% with slight end end range tightness.   Treatment Interventions (PT)   Interventions Therapeutic Procedure/Exercise;Manual Therapy;Neuromuscular Re-education;Therapeutic Activity   Therapeutic Procedure/Exercise   Therapeutic Procedures: strength, endurance, ROM, flexibility minutes (23235) 25   Ther Proc 1 HEP and education   Ther Proc 1 - Details discussed  continuing HEP, can do strengthening 3/week, but try and do some retraction daily.   PTRx Ther Proc 1 Cervical Retraction   PTRx Ther Proc 1 - Details x 10 reps- good performance    PTRx Ther Proc 2 Seated Cervical Retraction Extension With Overpressure   PTRx Ther Proc 2 - Details x 5 with some end range pain cueing to not force to end range   PTRx Ther Proc 3 Supine Cervical Retraction   PTRx Ther Proc 3 - Details x 5   PTRx Ther Proc 4 Upper Cervical Strengthening Extension   PTRx Ther Proc 4 - Details x 10 reps-  good form no pain   PTRx Ther Proc 5 Shoulder External Rotation Sidelying   PTRx Ther Proc 5 - Details 1lb x 10 good form no pain   PTRx Ther Proc 6 Shoulder Theraband External Rotation   PTRx Ther Proc 6 - Details yellow x 10 cueing for good starting position   PTRx Ther Proc 7 Thread the Needle   PTRx Ther Proc 7 - Details HEP- daily for pain relief   PTRx Ther Proc 8 All 4s Cat Cow   PTRx Ther Proc 8 - Details HEP daily for pain relief   Skilled Intervention Progression of HEP for cervical and scapular strengthening along with mobility to reduce pain, improve function and return to PLOF   Patient Response/Progress pain conts to centralize w/ ext progression; requires several cues for ER to avoid upper trap over compensation   PTRx Ther Proc 9 Pec Stretch Doorway   PTRx Ther Proc 9 - Details HEP   Neuromuscular Re-education   Neuromuscular re-ed of mvmt, balance, coord, kinesthetic sense, posture, proprioception minutes (28159) 5   PTRx Neuro Re-ed 1 Prone Arm Raise #1   PTRx Neuro Re-ed 1 - Details x 10 with good form   Skilled Intervention Progression of HEP for scapular strengthening along with rotator cuff strengthening due to likely overcompensation of L UE, improve function and return to PLOF   Patient Response/Progress tolerates addition of weights well   PTRx Neuro Re-ed 2 Prone Arm Raise #2   PTRx Neuro Re-ed 2 - Details x 5 with good form can progress to these with HEP and do instead of arm  raise #1   Manual Therapy   Manual Therapy Manual Therapy 2   Manual Therapy 1 NT  Manual cervical traction   Manual Therapy 1 - Details 60 sec x 6 reps   Manual Therapy 2 NT  SOR   Manual Therapy 2 - Details 3 min   Patient Response/Progress reports relief   Education   Learner/Method Patient;Pictures/Video   Education Comments uses PTRX online   Plan   Home program PTRx   Updates to plan of care updated HEP; goals addressed and partially met. Pt will continue independently with HEP. Will follow up with physician if symptoms not resolving   Plan for next session Discharge to HEP   Total Session Time   Timed Code Treatment Minutes 30   Total Treatment Time (sum of timed and untimed services) 30       DISCHARGE  Reason for Discharge: Patient has met all goals, or goals are nearing. Pt feels ready to continue with HEP.    Equipment Issued: none    Discharge Plan: Patient to continue home program.    Referring Provider:  Kaitlin Devine

## 2024-05-21 ENCOUNTER — OFFICE VISIT (OUTPATIENT)
Dept: PSYCHOLOGY | Facility: CLINIC | Age: 54
End: 2024-05-21
Payer: COMMERCIAL

## 2024-05-21 DIAGNOSIS — F43.22 ADJUSTMENT DISORDER WITH ANXIETY: Primary | ICD-10-CM

## 2024-05-21 PROCEDURE — 90834 PSYTX W PT 45 MINUTES: CPT | Performed by: MARRIAGE & FAMILY THERAPIST

## 2024-05-25 NOTE — PROGRESS NOTES
[facility]                                     Progress Note    Patient Name: Laya Zuleta  Date: 5/21/24           Service Type: Individual      Session Start Time: 11:05  Session End Time: 11:48     Session Length: 38 - 52 minutes    Session #: 33    Attendees: Client attended alone    Service Modality:  In person     Treatment Plan Last Reviewed: April 23, 2024      DATA  Interactive Complexity: No  Crisis: No       Progress Since Last Session (Related to Symptoms / Goals / Homework):   Symptoms: Improving .    Homework: Achieved / completed to satisfaction with strong self-care performance.      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      Intervention:   Taught/reviewed cognitive distortion and negative self-talk identification, reality checking and coping strategies. Client reports she is hampered by her persistent belief that she is not ok and her friends and family don't want to spend time with her. Processed emotions in session, validated, supportive counseling. Guidance offered to better utilize client's coping skills.  Assessments completed prior to visit:  The following assessments were completed by patient for this visit:  PHQ9:       1/15/2021    10:26 AM 7/2/2021     3:59 PM 10/19/2022    11:33 AM 6/8/2023     1:49 PM 10/6/2023     9:50 AM 1/19/2024    12:04 PM 4/25/2024     3:35 PM   PHQ-9 SCORE   PHQ-9 Total Score MyChart   0       PHQ-9 Total Score 5 0 0 0 0 0 0     GAD7:       9/18/2020    12:59 PM  1/15/2021    10:26 AM 7/2/2021     3:59 PM 6/8/2023     1:49 PM 10/6/2023     9:50 AM 1/19/2024    12:04 PM 4/25/2024     3:35 PM   ZAIRA-7 SCORE   Total Score 0 (minimal anxiety)         Total Score 0 0 0 2 3 3 2        ASSESSMENT: Current Emotional / Mental Status (status of significant symptoms):   Risk status (Self / Other harm or suicidal ideation)   Patient denies current fears or concerns for personal safety.   Patient denies current or recent suicidal ideation or behaviors.   Patientdenies current or recent homicidal ideation or behaviors.   Patient denies current or recent self injurious behavior or ideation.   Patient denies other safety concerns.   Patient reports there has been no change in risk factors since their last session.     Patientreports there has been no change in protective factors since their last session.     Recommended that patient call 911 or go to the local ED should there be a change in any of these risk factors.     Appearance:   Appropriate    Eye Contact:   Good    Psychomotor Behavior: Normal    Attitude:   Cooperative    Orientation:   All   Speech    Rate / Production: Normal     Volume:  Normal    Mood:    Anxious    Affect:    Appropriate    Thought Content:  Clear    Thought Form:  Coherent  Logical    Insight:    Good      Medication Review:   No changes to current psychiatric medication(s)     Medication Compliance:   Yes     Changes in Health Issues:   None reported     Chemical Use Review:   Substance Use: Chemical use reviewed, no active concerns identified      Tobacco Use: No current tobacco use.      Diagnosis:  Adjustment disorder with anxiety    Collateral Reports Completed:   Not Applicable    PLAN: (Patient Tasks / Therapist Tasks / Other)  Client will use cognitive distortion and negative self-talk identification, reality checking and coping strategies.      Chung Martinez MA, LMFT                                                          ______________________________________________________________________                                                 Treatment Plan    Client's Name: Laya Zuleta  YOB: 1970    Date: April 23, 2024    DSM-V Diagnoses: 309.24 - Adjustment Disorder with Anxiety  ; V71.09 - No Diagnosis  Psychosocial / Contextual Factors: daughter with bone disease  WHODAS: 13    Referral / Collaboration:  Referral to another professional/service is not indicated at this time..    Anticipated number of session or this episode of care: maintenance    Measurable Treatment Goal(s) related to diagnosis / functional impairment(s)   I will know I've met my goal when I learn tools to cope with and lower my anxiety.     Goal 1: Client will experience a significant reduction in ZAIRA-7 scores.     Objective #A   Client will learn and integrate DBT/CBT strategies to more effectively manage emotions and relationships.   Status: Continued: April 23, 2024    Intervention(s)   Therapist will teach emotional regulation, distress tolerance, interpersonal effectiveness, and mindfulness skills. Meditation resources will be offered. Therapist will teach TIPP skills: Temperature, Intense exercise, Paced breathing, and Paired Muscle Relaxation.    Objective #B   Client will identify cognitive distortions and negative self-talk contributing to depression and anxiety.   Status: Continued: April 23, 2024    Intervention(s)   Therapist will teach cognitive distortion identification and coping strategies.    Objective #C   Client will engage in positive self-care.  Status: Continued: April 23, 2024    Intervention(s)   Therapist will teach self-care goals:  Maintain balance in schedule (time for self/others, relaxation/activities, leisure/tasks, home/out of the house), assert needs and set limits with others, challenge negative thoughts/use affirming and encouraging self-talk, engage with support people on regular basis, practice  behavior activation behaviors (sleep hygiene, balanced eating, physical activity, medical needs, personal hygiene), acknowledge and accept your feelings.    Objective #D  Client will learn and use Love and Logic parenting skills with their children.    Status: Continued: April 23, 2024    Intervention(s)  Therapist will teach Love and Logic parenting skills.      Patient has reviewed and agreed to the above plan.    Rich Martinez MA, LMFT  April 23, 2024

## 2024-05-26 RX ORDER — SEMAGLUTIDE 1.7 MG/.75ML
INJECTION, SOLUTION SUBCUTANEOUS
Qty: 4 ML | Refills: 3 | Status: SHIPPED | OUTPATIENT
Start: 2024-05-26 | End: 2024-06-25

## 2024-05-29 ENCOUNTER — E-VISIT (OUTPATIENT)
Dept: PEDIATRICS | Facility: CLINIC | Age: 54
End: 2024-05-29
Payer: COMMERCIAL

## 2024-05-29 DIAGNOSIS — B37.9 CANDIDA INFECTION: Primary | ICD-10-CM

## 2024-05-29 PROCEDURE — 99421 OL DIG E/M SVC 5-10 MIN: CPT | Performed by: NURSE PRACTITIONER

## 2024-05-29 RX ORDER — NYSTATIN 100000 U/G
CREAM TOPICAL 2 TIMES DAILY
Qty: 60 G | Refills: 3 | Status: SHIPPED | OUTPATIENT
Start: 2024-05-29 | End: 2024-06-12

## 2024-06-04 ENCOUNTER — OFFICE VISIT (OUTPATIENT)
Dept: PSYCHOLOGY | Facility: CLINIC | Age: 54
End: 2024-06-04
Payer: COMMERCIAL

## 2024-06-04 DIAGNOSIS — F43.22 ADJUSTMENT DISORDER WITH ANXIETY: Primary | ICD-10-CM

## 2024-06-04 PROCEDURE — 90834 PSYTX W PT 45 MINUTES: CPT | Performed by: MARRIAGE & FAMILY THERAPIST

## 2024-06-06 NOTE — PROGRESS NOTES
[facility]                                     Progress Note    Patient Name: Laya Zuleta  Date: 6/04/24           Service Type: Individual      Session Start Time: 11:00  Session End Time: 11:45     Session Length: 38 - 52 minutes    Session #: 34    Attendees: Client attended alone    Service Modality:  In person     Treatment Plan Last Reviewed: April 23, 2024      DATA  Interactive Complexity: No  Crisis: No       Progress Since Last Session (Related to Symptoms / Goals / Homework):   Symptoms: Improving .    Homework: Achieved / completed to satisfaction with strong self-care performance.      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      Intervention:   Taught/reviewed cognitive distortion and negative self-talk identification, reality checking and coping strategies. Client reports she is hampered by her persistent belief that she is not ok and her friends and family don't want to spend time with her. Chain analyses of recent episodes in which she identified these beliefs about herself and reality-checked their validity. Processed emotions in session, validated, supportive counseling. Guidance offered to better utilize client's coping skills.    Assessments completed prior to visit:  The following assessments were completed by patient for this visit: none  PHQ9:       1/15/2021    10:26 AM 7/2/2021     3:59 PM 10/19/2022    11:33 AM 6/8/2023     1:49 PM 10/6/2023     9:50 AM 1/19/2024    12:04 PM 4/25/2024     3:35 PM    PHQ-9 SCORE   PHQ-9 Total Score MyChart   0       PHQ-9 Total Score 5 0 0 0 0 0 0     GAD7:       9/18/2020    12:59 PM 1/15/2021    10:26 AM 7/2/2021     3:59 PM 6/8/2023     1:49 PM 10/6/2023     9:50 AM 1/19/2024    12:04 PM 4/25/2024     3:35 PM   ZAIRA-7 SCORE   Total Score 0 (minimal anxiety)         Total Score 0 0 0 2 3 3 2        ASSESSMENT: Current Emotional / Mental Status (status of significant symptoms):   Risk status (Self / Other harm or suicidal ideation)   Patient denies current fears or concerns for personal safety.   Patient denies current or recent suicidal ideation or behaviors.   Patientdenies current or recent homicidal ideation or behaviors.   Patient denies current or recent self injurious behavior or ideation.   Patient denies other safety concerns.   Patient reports there has been no change in risk factors since their last session.     Patientreports there has been no change in protective factors since their last session.     Recommended that patient call 911 or go to the local ED should there be a change in any of these risk factors.     Appearance:   Appropriate    Eye Contact:   Good    Psychomotor Behavior: Normal    Attitude:   Friendly   Orientation:   All   Speech    Rate / Production: Normal     Volume:  Normal    Mood:    Anxious    Affect:    Appropriate    Thought Content:  Clear    Thought Form:  Coherent  Logical    Insight:    Good      Medication Review:   No changes to current psychiatric medication(s)     Medication Compliance:   Yes     Changes in Health Issues:   None reported     Chemical Use Review:   Substance Use: Chemical use reviewed, no active concerns identified      Tobacco Use: No current tobacco use.      Diagnosis:  Adjustment disorder with anxiety    Collateral Reports Completed:   Not Applicable    PLAN: (Patient Tasks / Therapist Tasks / Other)  Client will use cognitive distortion and negative self-talk identification, reality checking and coping  strategies regarding her self-worth.      Chung Martinez MA, LMFT                                                         ______________________________________________________________________                                                 Treatment Plan    Client's Name: Laya Zuleta  YOB: 1970    Date: April 23, 2024    DSM-V Diagnoses: 309.24 - Adjustment Disorder with Anxiety  ; V71.09 - No Diagnosis  Psychosocial / Contextual Factors: daughter with bone disease  WHODAS: 13    Referral / Collaboration:  Referral to another professional/service is not indicated at this time..    Anticipated number of session or this episode of care: maintenance    Measurable Treatment Goal(s) related to diagnosis / functional impairment(s)   I will know I've met my goal when I learn tools to cope with and lower my anxiety.     Goal 1: Client will experience a significant reduction in ZAIRA-7 scores.     Objective #A   Client will learn and integrate DBT/CBT strategies to more effectively manage emotions and relationships.   Status: Continued: April 23, 2024    Intervention(s)   Therapist will teach emotional regulation, distress tolerance, interpersonal effectiveness, and mindfulness skills. Meditation resources will be offered. Therapist will teach TIPP skills: Temperature, Intense exercise, Paced breathing, and Paired Muscle Relaxation.    Objective #B   Client will identify cognitive distortions and negative self-talk contributing to depression and anxiety.   Status: Continued: April 23, 2024    Intervention(s)   Therapist will teach cognitive distortion identification and coping strategies.    Objective #C   Client will engage in positive self-care.  Status: Continued: April 23, 2024    Intervention(s)   Therapist will teach self-care goals:  Maintain balance in schedule (time for self/others, relaxation/activities, leisure/tasks, home/out of the house), assert needs and set limits with others,  challenge negative thoughts/use affirming and encouraging self-talk, engage with support people on regular basis, practice behavior activation behaviors (sleep hygiene, balanced eating, physical activity, medical needs, personal hygiene), acknowledge and accept your feelings.    Objective #D  Client will learn and use Love and Logic parenting skills with their children.    Status: Continued: April 23, 2024    Intervention(s)  Therapist will teach Love and Logic parenting skills.      Patient has reviewed and agreed to the above plan.    Rich Martinez MA, LMFT  April 23, 2024

## 2024-07-02 ENCOUNTER — OFFICE VISIT (OUTPATIENT)
Dept: PSYCHOLOGY | Facility: CLINIC | Age: 54
End: 2024-07-02
Payer: COMMERCIAL

## 2024-07-02 DIAGNOSIS — F43.22 ADJUSTMENT DISORDER WITH ANXIETY: Primary | ICD-10-CM

## 2024-07-02 PROCEDURE — 90834 PSYTX W PT 45 MINUTES: CPT | Performed by: MARRIAGE & FAMILY THERAPIST

## 2024-07-03 NOTE — PROGRESS NOTES
[facility]                                     Progress Note    Patient Name: Laya Zuleta  Date: 7/02/24           Service Type: Individual      Session Start Time: 11:05  Session End Time: 11:47     Session Length: 38 - 52 minutes    Session #: 35    Attendees: Client attended alone    Service Modality:  In person     Treatment Plan Last Reviewed: April 23, 2024      DATA  Interactive Complexity: No  Crisis: No       Progress Since Last Session (Related to Symptoms / Goals / Homework):   Symptoms: Improving .    Homework: Achieved / completed to satisfaction with strong self-care performance.      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      Intervention:   Taught/reviewed Love and Logic parenting skills. Client reports her daughter is spending her summer sleeping well into the afternoon and not contributing to the house hold. Processed emotions in session, validated, supportive counseling. Guidance offered to better utilize client's coping skills.      Assessments completed prior to visit:  The following assessments were completed by patient for this visit: none  PHQ9:       1/15/2021    10:26 AM 7/2/2021     3:59 PM 10/19/2022    11:33 AM 6/8/2023     1:49 PM 10/6/2023     9:50 AM 1/19/2024    12:04 PM 4/25/2024     3:35 PM   PHQ-9 SCORE   PHQ-9 Total Score MyChart   0       PHQ-9 Total Score 5 0 0 0 0 0 0     GAD7:       9/18/2020    12:59 PM 1/15/2021    10:26 AM 7/2/2021     3:59 PM 6/8/2023     1:49 PM 10/6/2023     9:50 AM 1/19/2024    12:04  PM 4/25/2024     3:35 PM   ZAIRA-7 SCORE   Total Score 0 (minimal anxiety)         Total Score 0 0 0 2 3 3 2        ASSESSMENT: Current Emotional / Mental Status (status of significant symptoms):   Risk status (Self / Other harm or suicidal ideation)   Patient denies current fears or concerns for personal safety.   Patient denies current or recent suicidal ideation or behaviors.   Patientdenies current or recent homicidal ideation or behaviors.   Patient denies current or recent self injurious behavior or ideation.   Patient denies other safety concerns.   Patient reports there has been no change in risk factors since their last session.     Patientreports there has been no change in protective factors since their last session.     Recommended that patient call 911 or go to the local ED should there be a change in any of these risk factors.     Appearance:   Appropriate    Eye Contact:   Good    Psychomotor Behavior: Normal    Attitude:   Pleasant   Orientation:   All   Speech    Rate / Production: Normal     Volume:  Normal    Mood:    Anxious  Irritable    Affect:    Appropriate    Thought Content:  Clear    Thought Form:  Coherent  Logical    Insight:    Good      Medication Review:   No changes to current psychiatric medication(s)     Medication Compliance:   Yes     Changes in Health Issues:   None reported     Chemical Use Review:   Substance Use: Chemical use reviewed, no active concerns identified      Tobacco Use: No current tobacco use.      Diagnosis:  Adjustment disorder with anxiety    Collateral Reports Completed:   Not Applicable    PLAN: (Patient Tasks / Therapist Tasks / Other)  Client will use Love and Logic parenting skills approach to her daughter's behaviors.        Chung Martinez MA, LMFT                                                         ______________________________________________________________________                                                 Treatment Plan    Client's Name:  Laya Zuleta  YOB: 1970    Date: April 23, 2024    DSM-V Diagnoses: 309.24 - Adjustment Disorder with Anxiety  ; V71.09 - No Diagnosis  Psychosocial / Contextual Factors: daughter with bone disease  WHODAS: 13    Referral / Collaboration:  Referral to another professional/service is not indicated at this time..    Anticipated number of session or this episode of care: maintenance    Measurable Treatment Goal(s) related to diagnosis / functional impairment(s)   I will know I've met my goal when I learn tools to cope with and lower my anxiety.     Goal 1: Client will experience a significant reduction in ZAIRA-7 scores.     Objective #A   Client will learn and integrate DBT/CBT strategies to more effectively manage emotions and relationships.   Status: Continued: April 23, 2024  Intervention(s)   Therapist will teach emotional regulation, distress tolerance, interpersonal effectiveness, and mindfulness skills. Meditation resources will be offered. Therapist will teach TIPP skills: Temperature, Intense exercise, Paced breathing, and Paired Muscle Relaxation.    Objective #B   Client will identify cognitive distortions and negative self-talk contributing to depression and anxiety.   Status: Continued: April 23, 2024  Intervention(s)   Therapist will teach cognitive distortion identification and coping strategies.    Objective #C   Client will engage in positive self-care.  Status: Continued: April 23, 2024  Intervention(s)   Therapist will teach self-care goals:  Maintain balance in schedule (time for self/others, relaxation/activities, leisure/tasks, home/out of the house), assert needs and set limits with others, challenge negative thoughts/use affirming and encouraging self-talk, engage with support people on regular basis, practice behavior activation behaviors (sleep hygiene, balanced eating, physical activity, medical needs, personal hygiene), acknowledge and accept your feelings.    Objective  #D  Client will learn and use Love and Logic parenting skills with their children.  Status: Continued: April 23, 2024  Intervention(s)  Therapist will teach Love and Logic parenting skills.      Patient has reviewed and agreed to the above plan.    Rich Martinez MA, LMFT  April 23, 2024

## 2024-07-15 ENCOUNTER — VIRTUAL VISIT (OUTPATIENT)
Dept: PSYCHOLOGY | Facility: CLINIC | Age: 54
End: 2024-07-15
Payer: COMMERCIAL

## 2024-07-15 DIAGNOSIS — F43.22 ADJUSTMENT DISORDER WITH ANXIETY: Primary | ICD-10-CM

## 2024-07-15 PROCEDURE — 90834 PSYTX W PT 45 MINUTES: CPT | Mod: 95 | Performed by: MARRIAGE & FAMILY THERAPIST

## 2024-07-18 NOTE — PROGRESS NOTES
[facility]                                     Progress Note    Patient Name: Laya Zuleta  Date: 7/15/24           Service Type: Individual      Session Start Time: 3:30  Session End Time: 14:15     Session Length: 38 - 52 minutes    Session #: 36    Attendees: Client attended alone    Service Modality:  Video Visit:      Provider verified identity through the following two step process.  Patient provided:  Patient is known previously to provider    Telemedicine Visit: The patient's condition can be safely assessed and treated via synchronous audio and visual telemedicine encounter.      Reason for Telemedicine Visit: Patient has requested telehealth visit    Originating Site (Patient Location): Patient's home    Distant Site (Provider Location): Provider Remote Setting- Home Office    Consent:  The patient/guardian has verbally consented to: the potential risks and benefits of telemedicine (video visit) versus in person care; bill my insurance or make self-payment for services provided; and responsibility for payment of non-covered services.     Mode of Communication:  Video Conference via Doximity    Distant Location (Provider):  On-site    As the provider I attest to compliance with applicable laws and regulations related to telemedicine.      Treatment Plan Last Reviewed: April 23, 2024      DATA  Interactive Complexity: No  Crisis: No       Progress Since Last Session (Related to Symptoms / Goals / Homework):   Symptoms: Improving .    Homework: Achieved / completed to satisfaction with strong self-care performance.      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       Intervention:   Taught/reviewed/role-played interpersonal effectiveness, communication, negotiation and boundary setting skills. Client reports her  behaves differently around her family than any other social occasion. She says he is inappropriate, irritable and obnoxious. She says she has addressed this with him for many years with no change in his behavior. Discussed options and approaches. Processed emotions in session, validated, supportive counseling. Guidance offered to better utilize client's coping skills.    Assessments completed prior to visit:  The following assessments were completed by patient for this visit: none  PHQ9:       1/15/2021    10:26 AM 7/2/2021     3:59 PM 10/19/2022    11:33 AM 6/8/2023     1:49 PM 10/6/2023     9:50 AM 1/19/2024    12:04 PM 4/25/2024     3:35 PM   PHQ-9 SCORE   PHQ-9 Total Score MyChart   0       PHQ-9 Total Score 5 0 0 0 0 0 0     GAD7:       9/18/2020    12:59 PM 1/15/2021    10:26 AM 7/2/2021     3:59 PM 6/8/2023     1:49 PM 10/6/2023     9:50 AM 1/19/2024    12:04 PM 4/25/2024     3:35 PM   ZAIRA-7 SCORE   Total Score 0 (minimal anxiety)         Total Score 0 0 0 2 3 3 2        ASSESSMENT: Current Emotional / Mental Status (status of significant symptoms):   Risk status (Self / Other harm or suicidal ideation)   Patient denies current fears or concerns for personal safety.   Patient denies current or recent suicidal ideation or behaviors.   Patientdenies current or recent homicidal ideation or behaviors.   Patient denies current or recent self injurious behavior or ideation.   Patient denies other safety concerns.   Patient reports there has been no change in risk factors since their last session.     Patientreports there has been no change in protective factors since their last session.     Recommended that  patient call 911 or go to the local ED should there be a change in any of these risk factors.     Appearance:   Appropriate    Eye Contact:   Good    Psychomotor Behavior: Normal    Attitude:   Interested   Orientation:   All   Speech    Rate / Production: Normal     Volume:  Normal    Mood:    Anxious  Irritable    Affect:    Appropriate    Thought Content:  Clear    Thought Form:  Coherent  Logical    Insight:    Good      Medication Review:   No changes to current psychiatric medication(s)     Medication Compliance:   Yes     Changes in Health Issues:   None reported     Chemical Use Review:   Substance Use: Chemical use reviewed, no active concerns identified      Tobacco Use: No current tobacco use.      Diagnosis:  Adjustment disorder with anxiety    Collateral Reports Completed:   Not Applicable    PLAN: (Patient Tasks / Therapist Tasks / Other)  Client will use interpersonal effectiveness skills to deal with her 's behavior around her family.        Chung Martinez MA, [facility]                                                         ______________________________________________________________________                                                 Treatment Plan    Client's Name: Laya Zuleta  YOB: 1970    Date: April 23, 2024    DSM-V Diagnoses: 309.24 - Adjustment Disorder with Anxiety  ; V71.09 - No Diagnosis  Psychosocial / Contextual Factors: daughter with bone disease  WHODAS: 13    Referral / Collaboration:  Referral to another professional/service is not indicated at this time..    Anticipated number of session or this episode of care: maintenance    Measurable Treatment Goal(s) related to diagnosis / functional impairment(s)   I will know I've met my goal when I learn tools to cope with and lower my anxiety.     Goal 1: Client will experience a significant reduction in ZAIRA-7 scores.     Objective #A   Client will learn and integrate DBT/CBT strategies to more  effectively manage emotions and relationships.   Status: Continued: April 23, 2024  Intervention(s)   Therapist will teach emotional regulation, distress tolerance, interpersonal effectiveness, and mindfulness skills. Meditation resources will be offered. Therapist will teach TIPP skills: Temperature, Intense exercise, Paced breathing, and Paired Muscle Relaxation.    Objective #B   Client will identify cognitive distortions and negative self-talk contributing to depression and anxiety.   Status: Continued: April 23, 2024  Intervention(s)   Therapist will teach cognitive distortion identification and coping strategies.    Objective #C   Client will engage in positive self-care.  Status: Continued: April 23, 2024  Intervention(s)   Therapist will teach self-care goals:  Maintain balance in schedule (time for self/others, relaxation/activities, leisure/tasks, home/out of the house), assert needs and set limits with others, challenge negative thoughts/use affirming and encouraging self-talk, engage with support people on regular basis, practice behavior activation behaviors (sleep hygiene, balanced eating, physical activity, medical needs, personal hygiene), acknowledge and accept your feelings.    Objective #D  Client will learn and use Love and Logic parenting skills with their children.  Status: Continued: April 23, 2024  Intervention(s)  Therapist will teach Love and Logic parenting skills.      Patient has reviewed and agreed to the above plan.    Rich Martinez MA, LMFT  April 23, 2024

## 2024-07-30 ENCOUNTER — OFFICE VISIT (OUTPATIENT)
Dept: PSYCHOLOGY | Facility: CLINIC | Age: 54
End: 2024-07-30
Payer: COMMERCIAL

## 2024-07-30 DIAGNOSIS — F43.22 ADJUSTMENT DISORDER WITH ANXIETY: Primary | ICD-10-CM

## 2024-07-30 PROCEDURE — 90834 PSYTX W PT 45 MINUTES: CPT | Performed by: MARRIAGE & FAMILY THERAPIST

## 2024-08-01 ASSESSMENT — ANXIETY QUESTIONNAIRES
GAD7 TOTAL SCORE: 3
6. BECOMING EASILY ANNOYED OR IRRITABLE: SEVERAL DAYS
2. NOT BEING ABLE TO STOP OR CONTROL WORRYING: NOT AT ALL
5. BEING SO RESTLESS THAT IT IS HARD TO SIT STILL: NOT AT ALL
1. FEELING NERVOUS, ANXIOUS, OR ON EDGE: SEVERAL DAYS
GAD7 TOTAL SCORE: 3
7. FEELING AFRAID AS IF SOMETHING AWFUL MIGHT HAPPEN: NOT AT ALL
IF YOU CHECKED OFF ANY PROBLEMS ON THIS QUESTIONNAIRE, HOW DIFFICULT HAVE THESE PROBLEMS MADE IT FOR YOU TO DO YOUR WORK, TAKE CARE OF THINGS AT HOME, OR GET ALONG WITH OTHER PEOPLE: SOMEWHAT DIFFICULT
3. WORRYING TOO MUCH ABOUT DIFFERENT THINGS: SEVERAL DAYS

## 2024-08-01 ASSESSMENT — COLUMBIA-SUICIDE SEVERITY RATING SCALE - C-SSRS
2. HAVE YOU ACTUALLY HAD ANY THOUGHTS OF KILLING YOURSELF?: NO
TOTAL  NUMBER OF INTERRUPTED ATTEMPTS SINCE LAST CONTACT: NO
6. HAVE YOU EVER DONE ANYTHING, STARTED TO DO ANYTHING, OR PREPARED TO DO ANYTHING TO END YOUR LIFE?: NO
SUICIDE, SINCE LAST CONTACT: NO
1. SINCE LAST CONTACT, HAVE YOU WISHED YOU WERE DEAD OR WISHED YOU COULD GO TO SLEEP AND NOT WAKE UP?: NO
TOTAL  NUMBER OF ABORTED OR SELF INTERRUPTED ATTEMPTS SINCE LAST CONTACT: NO
ATTEMPT SINCE LAST CONTACT: NO

## 2024-08-01 ASSESSMENT — PATIENT HEALTH QUESTIONNAIRE - PHQ9
5. POOR APPETITE OR OVEREATING: NOT AT ALL
SUM OF ALL RESPONSES TO PHQ QUESTIONS 1-9: 0

## 2024-08-01 NOTE — PROGRESS NOTES
[facility]                                     Progress Note    Patient Name: Laya Zuleta  Date: 7/30/24           Service Type: Individual      Session Start Time: 3:30  Session End Time: 14:15     Session Length: 38 - 52 minutes    Session #: 36    Attendees: Client attended alone    Service Modality:  Video Visit:      Provider verified identity through the following two step process.  Patient provided:  Patient is known previously to provider    Telemedicine Visit: The patient's condition can be safely assessed and treated via synchronous audio and visual telemedicine encounter.      Reason for Telemedicine Visit: Patient has requested telehealth visit    Originating Site (Patient Location): Patient's home    Distant Site (Provider Location): Provider Remote Setting- Home Office    Consent:  The patient/guardian has verbally consented to: the potential risks and benefits of telemedicine (video visit) versus in person care; bill my insurance or make self-payment for services provided; and responsibility for payment of non-covered services.     Mode of Communication:  Video Conference via Doximity    Distant Location (Provider):  On-site    As the provider I attest to compliance with applicable laws and regulations related to telemedicine.      Treatment Plan Last Reviewed: July 30, 2024      DATA  Interactive Complexity: No  Crisis: No       Progress Since Last Session (Related to Symptoms / Goals / Homework):   Symptoms: Improving .    Homework: Achieved / completed to satisfaction with strong self-care performance.      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       Intervention:   Taught/reviewed/role-played interpersonal effectiveness, communication, negotiation and boundary setting skills. Client reports her  and her daughter are responding to her communication skills and strategies by making progress toward better relationships. Chain analyses of significant exchanges and their effects. Processed emotions in session, validated, supportive counseling. Guidance offered to better utilize client's coping skills.    Assessments completed prior to visit:  The following assessments were completed by patient for this visit: none  PHQ9:       7/2/2021     3:59 PM 10/19/2022    11:33 AM 6/8/2023     1:49 PM 10/6/2023     9:50 AM 1/19/2024    12:04 PM 4/25/2024     3:35 PM 8/1/2024    12:33 PM   PHQ-9 SCORE   PHQ-9 Total Score MyChart  0        PHQ-9 Total Score 0 0 0 0 0 0 0     GAD7:       1/15/2021    10:26 AM 7/2/2021     3:59 PM 6/8/2023     1:49 PM 10/6/2023     9:50 AM 1/19/2024    12:04 PM 4/25/2024     3:35 PM 8/1/2024    12:33 PM   ZAIRA-7 SCORE   Total Score 0 0 2 3 3 2 3        ASSESSMENT: Current Emotional / Mental Status (status of significant symptoms):   Risk status (Self / Other harm or suicidal ideation)   Patient denies current fears or concerns for personal safety.   Patient denies current or recent suicidal ideation or behaviors.   Patientdenies current or recent homicidal ideation or behaviors.   Patient denies current or recent self injurious behavior or ideation.   Patient denies other safety concerns.   Patient reports there has been no change in risk factors since their last session.     Patientreports there has been no change in protective factors since their last session.     Recommended that patient call 911 or go to the local ED should there be a change in any of these risk  factors.     Appearance:   Appropriate    Eye Contact:   Good    Psychomotor Behavior: Normal    Attitude:   Friendly   Orientation:   All   Speech    Rate / Production: Normal     Volume:  Normal    Mood:    Normal   Affect:    Appropriate    Thought Content:  Clear    Thought Form:  Coherent  Logical    Insight:    Good      Medication Review:   No changes to current psychiatric medication(s)     Medication Compliance:   Yes     Changes in Health Issues:   None reported     Chemical Use Review:   Substance Use: Chemical use reviewed, no active concerns identified      Tobacco Use: No current tobacco use.      Diagnosis:  Adjustment disorder with anxiety    Collateral Reports Completed:   Not Applicable    PLAN: (Patient Tasks / Therapist Tasks / Other)  Client will use interpersonal effectiveness skills to deal with her family's behavior.        Chung Martinez MA, LMFT                                                         ______________________________________________________________________                                                 Treatment Plan    Client's Name: Laya Zuleta  YOB: 1970    Date: July 30, 2024    DSM-V Diagnoses: 309.24 - Adjustment Disorder with Anxiety  ; V71.09 - No Diagnosis  Psychosocial / Contextual Factors: daughter with bone disease  WHODAS: 13    Referral / Collaboration:  Referral to another professional/service is not indicated at this time..    Anticipated number of session or this episode of care: maintenance    Measurable Treatment Goal(s) related to diagnosis / functional impairment(s)   I will know I've met my goal when I learn tools to cope with and lower my anxiety.     Goal 1: Client will experience a significant reduction in ZAIRA-7 scores.     Objective #A   Client will learn and integrate DBT/CBT strategies to more effectively manage emotions and relationships.   Status: Continued: July 30, 2024  Intervention(s)   Therapist will teach  emotional regulation, distress tolerance, interpersonal effectiveness, and mindfulness skills. Meditation resources will be offered. Therapist will teach TIPP skills: Temperature, Intense exercise, Paced breathing, and Paired Muscle Relaxation.    Objective #B   Client will identify cognitive distortions and negative self-talk contributing to depression and anxiety.   Status: Continued: July 30, 2024  Intervention(s)   Therapist will teach cognitive distortion identification and coping strategies.    Objective #C   Client will engage in positive self-care.  Status: Continued: July 30, 2024  Intervention(s)   Therapist will teach self-care goals:  Maintain balance in schedule (time for self/others, relaxation/activities, leisure/tasks, home/out of the house), assert needs and set limits with others, challenge negative thoughts/use affirming and encouraging self-talk, engage with support people on regular basis, practice behavior activation behaviors (sleep hygiene, balanced eating, physical activity, medical needs, personal hygiene), acknowledge and accept your feelings.    Objective #D  Client will learn and use Love and Logic parenting skills with their children.  Status: Continued: July 30, 2024  Intervention(s)  Therapist will teach Love and Logic parenting skills.      Patient has reviewed and agreed to the above plan.    Rich Martinez MA, LMFT  July 30, 2024

## 2024-08-27 ENCOUNTER — OFFICE VISIT (OUTPATIENT)
Dept: PSYCHOLOGY | Facility: CLINIC | Age: 54
End: 2024-08-27
Payer: COMMERCIAL

## 2024-08-27 DIAGNOSIS — F43.22 ADJUSTMENT DISORDER WITH ANXIETY: Primary | ICD-10-CM

## 2024-08-27 PROCEDURE — 90834 PSYTX W PT 45 MINUTES: CPT | Performed by: MARRIAGE & FAMILY THERAPIST

## 2024-08-29 NOTE — PROGRESS NOTES
[facility]                                     Progress Note    Patient Name: Laya Zuleta  Date: 8/27/24           Service Type: Individual      Session Start Time: 11:00  Session End Time: 11:48     Session Length: 38 - 52 minutes    Session #: 37    Attendees: Client attended alone    Service Modality:  In Person     Treatment Plan Last Reviewed: July 30, 2024      DATA  Interactive Complexity: No  Crisis: No       Progress Since Last Session (Related to Symptoms / Goals / Homework):   Symptoms: Improving .    Homework: Achieved / completed to satisfaction with strong self-care performance.      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       Intervention:   Taught/reviewed/role-played interpersonal effectiveness, communication, negotiation and boundary setting skills. Client reports her  is not understanding her dismay at his inappropriate comments when her family is present. She endorses a strategy of not needing him to understand, but instead just stop the remarks. Discussed options to communicate this request. Processed emotions in session, validated, supportive counseling. Guidance offered to better utilize client's coping skills.    Assessments completed prior to visit:  The following assessments were completed by patient for this visit: none  PHQ9:       7/2/2021     3:59 PM 10/19/2022    11:33 AM 6/8/2023     1:49 PM 10/6/2023     9:50 AM 1/19/2024    12:04 PM 4/25/2024     3:35 PM 8/1/2024    12:33 PM   PHQ-9  SCORE   PHQ-9 Total Score MyChart  0        PHQ-9 Total Score 0 0 0 0 0 0 0     GAD7:       1/15/2021    10:26 AM 7/2/2021     3:59 PM 6/8/2023     1:49 PM 10/6/2023     9:50 AM 1/19/2024    12:04 PM 4/25/2024     3:35 PM 8/1/2024    12:33 PM   ZAIRA-7 SCORE   Total Score 0 0 2 3 3 2 3        ASSESSMENT: Current Emotional / Mental Status (status of significant symptoms):   Risk status (Self / Other harm or suicidal ideation)   Patient denies current fears or concerns for personal safety.   Patient denies current or recent suicidal ideation or behaviors.   Patientdenies current or recent homicidal ideation or behaviors.   Patient denies current or recent self injurious behavior or ideation.   Patient denies other safety concerns.   Patient reports there has been no change in risk factors since their last session.     Patientreports there has been no change in protective factors since their last session.     Recommended that patient call 911 or go to the local ED should there be a change in any of these risk factors.     Appearance:   Appropriate    Eye Contact:   Good    Psychomotor Behavior: Normal    Attitude:   Interested   Orientation:   All   Speech    Rate / Production: Normal     Volume:  Normal    Mood:    Irritable    Affect:    Appropriate    Thought Content:  Clear    Thought Form:  Coherent  Logical    Insight:    Good      Medication Review:   No changes to current psychiatric medication(s)     Medication Compliance:   Yes     Changes in Health Issues:   None reported     Chemical Use Review:   Substance Use: Chemical use reviewed, no active concerns identified      Tobacco Use: No current tobacco use.      Diagnosis:  Adjustment disorder with anxiety    Collateral Reports Completed:   Not Applicable    PLAN: (Patient Tasks / Therapist Tasks / Other)  Client will use interpersonal effectiveness skills to deal with her 's behavior.        Chung Martinez MA, LMFT                                                          ______________________________________________________________________                                                 Treatment Plan    Client's Name: Laya Zuleta  YOB: 1970    Date: July 30, 2024    DSM-V Diagnoses: 309.24 - Adjustment Disorder with Anxiety  ; V71.09 - No Diagnosis  Psychosocial / Contextual Factors: daughter with bone disease  WHODAS: 13    Referral / Collaboration:  Referral to another professional/service is not indicated at this time..    Anticipated number of session or this episode of care: maintenance    Measurable Treatment Goal(s) related to diagnosis / functional impairment(s)   I will know I've met my goal when I learn tools to cope with and lower my anxiety.     Goal 1: Client will experience a significant reduction in ZAIRA-7 scores.     Objective #A   Client will learn and integrate DBT/CBT strategies to more effectively manage emotions and relationships.   Status: Continued: July 30, 2024  Intervention(s)   Therapist will teach emotional regulation, distress tolerance, interpersonal effectiveness, and mindfulness skills. Meditation resources will be offered. Therapist will teach TIPP skills: Temperature, Intense exercise, Paced breathing, and Paired Muscle Relaxation.    Objective #B   Client will identify cognitive distortions and negative self-talk contributing to depression and anxiety.   Status: Continued: July 30, 2024  Intervention(s)   Therapist will teach cognitive distortion identification and coping strategies.    Objective #C   Client will engage in positive self-care.  Status: Continued: July 30, 2024  Intervention(s)   Therapist will teach self-care goals:  Maintain balance in schedule (time for self/others, relaxation/activities, leisure/tasks, home/out of the house), assert needs and set limits with others, challenge negative thoughts/use affirming and encouraging self-talk, engage with support people on regular basis,  practice behavior activation behaviors (sleep hygiene, balanced eating, physical activity, medical needs, personal hygiene), acknowledge and accept your feelings.    Objective #D  Client will learn and use Love and Logic parenting skills with their children.  Status: Continued: July 30, 2024  Intervention(s)  Therapist will teach Love and Logic parenting skills.      Patient has reviewed and agreed to the above plan.    Rich Martinez MA, LMFT  July 30, 2024

## 2024-09-10 ENCOUNTER — OFFICE VISIT (OUTPATIENT)
Dept: PSYCHOLOGY | Facility: CLINIC | Age: 54
End: 2024-09-10
Payer: COMMERCIAL

## 2024-09-10 DIAGNOSIS — F43.22 ADJUSTMENT DISORDER WITH ANXIETY: Primary | ICD-10-CM

## 2024-09-10 PROCEDURE — 90834 PSYTX W PT 45 MINUTES: CPT | Performed by: MARRIAGE & FAMILY THERAPIST

## 2024-09-13 NOTE — PROGRESS NOTES
[facility]                                     Progress Note    Patient Name: Laya Zuleta  Date: 9/10/24           Service Type: Individual      Session Start Time: 11:05  Session End Time: 11:51     Session Length: 38 - 52 minutes    Session #: 38    Attendees: Client attended alone    Service Modality:  In Person     Treatment Plan Last Reviewed: July 30, 2024      DATA  Interactive Complexity: No  Crisis: No       Progress Since Last Session (Related to Symptoms / Goals / Homework):   Symptoms: Improving .    Homework: Achieved / completed to satisfaction with strong self-care performance.      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       Intervention:   Taught/reviewed/role-played interpersonal effectiveness, communication, negotiation and boundary setting skills. Client reports she is having difficulty with a long time friend who appears to be deteriorating due to alcohol and tobacco dependencies. She says the friend has not been someone client has much experience setting boundaries, though now she wants to make some exceptions. Discussed long term the potential that the friend will ask more of client than she wants to give. Processed emotions in session, validated, supportive counseling. Guidance offered to better utilize client's coping skills    Assessments completed prior to visit:  The following assessments were completed by patient for this visit: none  PHQ9:       7/2/2021     3:59 PM 10/19/2022    11:33 AM  6/8/2023     1:49 PM 10/6/2023     9:50 AM 1/19/2024    12:04 PM 4/25/2024     3:35 PM 8/1/2024    12:33 PM   PHQ-9 SCORE   PHQ-9 Total Score MyChart  0        PHQ-9 Total Score 0 0 0 0 0 0 0     GAD7:       1/15/2021    10:26 AM 7/2/2021     3:59 PM 6/8/2023     1:49 PM 10/6/2023     9:50 AM 1/19/2024    12:04 PM 4/25/2024     3:35 PM 8/1/2024    12:33 PM   ZAIRA-7 SCORE   Total Score 0 0 2 3 3 2 3        ASSESSMENT: Current Emotional / Mental Status (status of significant symptoms):   Risk status (Self / Other harm or suicidal ideation)   Patient denies current fears or concerns for personal safety.   Patient denies current or recent suicidal ideation or behaviors.   Patientdenies current or recent homicidal ideation or behaviors.   Patient denies current or recent self injurious behavior or ideation.   Patient denies other safety concerns.   Patient reports there has been no change in risk factors since their last session.     Patientreports there has been no change in protective factors since their last session.     Recommended that patient call 911 or go to the local ED should there be a change in any of these risk factors.     Appearance:   Appropriate    Eye Contact:   Good    Psychomotor Behavior: Normal    Attitude:   Cooperative  Attentive   Orientation:   All   Speech    Rate / Production: Normal     Volume:  Normal    Mood:    Anxious    Affect:    Appropriate    Thought Content:  Clear    Thought Form:  Coherent  Logical    Insight:    Good      Medication Review:   No changes to current psychiatric medication(s)     Medication Compliance:   Yes     Changes in Health Issues:   None reported     Chemical Use Review:   Substance Use: Chemical use reviewed, no active concerns identified      Tobacco Use: No current tobacco use.      Diagnosis:  Adjustment disorder with anxiety    Collateral Reports Completed:   Not Applicable    PLAN: (Patient Tasks / Therapist Tasks / Other)  Client will use interpersonal  effectiveness skills to deal with her long time friend's behavior.        Chung Martinez MA, LMFT                                                         ______________________________________________________________________                                                 Treatment Plan    Client's Name: Laya Zuleta  YOB: 1970    Date: July 30, 2024    DSM-V Diagnoses: 309.24 - Adjustment Disorder with Anxiety  ; V71.09 - No Diagnosis  Psychosocial / Contextual Factors: daughter with bone disease  WHODAS: 13    Referral / Collaboration:  Referral to another professional/service is not indicated at this time..    Anticipated number of session or this episode of care: maintenance    Measurable Treatment Goal(s) related to diagnosis / functional impairment(s)   I will know I've met my goal when I learn tools to cope with and lower my anxiety.     Goal 1: Client will experience a significant reduction in ZAIRA-7 scores.     Objective #A   Client will learn and integrate DBT/CBT strategies to more effectively manage emotions and relationships.   Status: Continued: July 30, 2024  Intervention(s)   Therapist will teach emotional regulation, distress tolerance, interpersonal effectiveness, and mindfulness skills. Meditation resources will be offered. Therapist will teach TIPP skills: Temperature, Intense exercise, Paced breathing, and Paired Muscle Relaxation.    Objective #B   Client will identify cognitive distortions and negative self-talk contributing to depression and anxiety.   Status: Continued: July 30, 2024  Intervention(s)   Therapist will teach cognitive distortion identification and coping strategies.    Objective #C   Client will engage in positive self-care.  Status: Continued: July 30, 2024  Intervention(s)   Therapist will teach self-care goals:  Maintain balance in schedule (time for self/others, relaxation/activities, leisure/tasks, home/out of the house), assert needs and set  limits with others, challenge negative thoughts/use affirming and encouraging self-talk, engage with support people on regular basis, practice behavior activation behaviors (sleep hygiene, balanced eating, physical activity, medical needs, personal hygiene), acknowledge and accept your feelings.    Objective #D  Client will learn and use Love and Logic parenting skills with their children.  Status: Continued: July 30, 2024  Intervention(s)  Therapist will teach Love and Logic parenting skills.      Patient has reviewed and agreed to the above plan.    Rich Martinez MA, LMFT  July 30, 2024

## 2024-09-24 ENCOUNTER — OFFICE VISIT (OUTPATIENT)
Dept: PSYCHOLOGY | Facility: CLINIC | Age: 54
End: 2024-09-24
Payer: COMMERCIAL

## 2024-09-24 DIAGNOSIS — F43.22 ADJUSTMENT DISORDER WITH ANXIETY: Primary | ICD-10-CM

## 2024-09-24 PROCEDURE — 90834 PSYTX W PT 45 MINUTES: CPT | Performed by: MARRIAGE & FAMILY THERAPIST

## 2024-09-27 NOTE — PROGRESS NOTES
[facility]                                     Progress Note    Patient Name: Laya Zuleta  Date: 9/24/24           Service Type: Individual      Session Start Time: 11:02  Session End Time: 11:46     Session Length: 38 - 52 minutes    Session #: 39    Attendees: Client attended alone    Service Modality:  In Person     Treatment Plan Last Reviewed: July 30, 2024      DATA  Interactive Complexity: No  Crisis: No       Progress Since Last Session (Related to Symptoms / Goals / Homework):   Symptoms: Improving .    Homework: Achieved / completed to satisfaction with strong self-care performance.      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       Intervention:   Taught/reviewed emotion regulation (P.L.E.A.S.E.) skills and strategies for daily use. Client reports she is negotiating several school opportunities and obligations of her daughter in high school. She says her daughter is better able to communicate, which makes the process more effective. Chain analyzes of effective episodes. Processed emotions in session, validated, supportive counseling. Guidance offered to better utilize client's coping skills.    Assessments completed prior to visit:  The following assessments were completed by patient for this visit: none  PHQ9:       7/2/2021     3:59 PM 10/19/2022    11:33 AM 6/8/2023     1:49 PM 10/6/2023     9:50 AM 1/19/2024    12:04 PM 4/25/2024     3:35 PM 8/1/2024    12:33 PM   PHQ-9 SCORE   PHQ-9 Total Score MyChart  0         PHQ-9 Total Score 0 0 0 0 0 0 0     GAD7:       1/15/2021    10:26 AM 7/2/2021     3:59 PM 6/8/2023     1:49 PM 10/6/2023     9:50 AM 1/19/2024    12:04 PM 4/25/2024     3:35 PM 8/1/2024    12:33 PM   ZAIRA-7 SCORE   Total Score 0 0 2 3 3 2 3        ASSESSMENT: Current Emotional / Mental Status (status of significant symptoms):   Risk status (Self / Other harm or suicidal ideation)   Patient denies current fears or concerns for personal safety.   Patient denies current or recent suicidal ideation or behaviors.   Patientdenies current or recent homicidal ideation or behaviors.   Patient denies current or recent self injurious behavior or ideation.   Patient denies other safety concerns.   Patient reports there has been no change in risk factors since their last session.     Patientreports there has been no change in protective factors since their last session.     Recommended that patient call 911 or go to the local ED should there be a change in any of these risk factors.     Appearance:   Appropriate    Eye Contact:   Good    Psychomotor Behavior: Normal    Attitude:   Interested   Orientation:   All   Speech    Rate / Production: Normal     Volume:  Normal    Mood:    Anxious    Affect:    Appropriate    Thought Content:  Clear    Thought Form:  Coherent  Logical    Insight:    Good      Medication Review:   No changes to current psychiatric medication(s)     Medication Compliance:   Yes     Changes in Health Issues:   None reported     Chemical Use Review:   Substance Use: Chemical use reviewed, no active concerns identified      Tobacco Use: No current tobacco use.      Diagnosis:  Adjustment disorder with anxiety    Collateral Reports Completed:   Not Applicable    PLAN: (Patient Tasks / Therapist Tasks / Other)  Client will use emotion regulation (P.L.E.A.S.E.) skills and strategies daily.    Chung Martinez MA, LMFT                                                          ______________________________________________________________________                                                 Treatment Plan    Client's Name: Laya Zuleta  YOB: 1970    Date: July 30, 2024    DSM-V Diagnoses: 309.24 - Adjustment Disorder with Anxiety  ; V71.09 - No Diagnosis  Psychosocial / Contextual Factors: daughter with bone disease  WHODAS: 13    Referral / Collaboration:  Referral to another professional/service is not indicated at this time..    Anticipated number of session or this episode of care: maintenance    Measurable Treatment Goal(s) related to diagnosis / functional impairment(s)   I will know I've met my goal when I learn tools to cope with and lower my anxiety.     Goal 1: Client will experience a significant reduction in ZAIRA-7 scores.     Objective #A   Client will learn and integrate DBT/CBT strategies to more effectively manage emotions and relationships.   Status: Continued: July 30, 2024  Intervention(s)   Therapist will teach emotional regulation, distress tolerance, interpersonal effectiveness, and mindfulness skills. Meditation resources will be offered. Therapist will teach TIPP skills: Temperature, Intense exercise, Paced breathing, and Paired Muscle Relaxation.    Objective #B   Client will identify cognitive distortions and negative self-talk contributing to depression and anxiety.   Status: Continued: July 30, 2024  Intervention(s)   Therapist will teach cognitive distortion identification and coping strategies.    Objective #C   Client will engage in positive self-care.  Status: Continued: July 30, 2024  Intervention(s)   Therapist will teach self-care goals:  Maintain balance in schedule (time for self/others, relaxation/activities, leisure/tasks, home/out of the house), assert needs and set limits with others, challenge negative thoughts/use affirming and encouraging self-talk, engage with support people on regular basis, practice behavior  activation behaviors (sleep hygiene, balanced eating, physical activity, medical needs, personal hygiene), acknowledge and accept your feelings.    Objective #D  Client will learn and use Love and Logic parenting skills with their children.  Status: Continued: July 30, 2024  Intervention(s)  Therapist will teach Love and Logic parenting skills.      Patient has reviewed and agreed to the above plan.    Rich Martinez MA, LMFT  July 30, 2024

## 2024-10-08 ENCOUNTER — OFFICE VISIT (OUTPATIENT)
Dept: PSYCHOLOGY | Facility: CLINIC | Age: 54
End: 2024-10-08
Payer: COMMERCIAL

## 2024-10-08 DIAGNOSIS — F43.22 ADJUSTMENT DISORDER WITH ANXIETY: Primary | ICD-10-CM

## 2024-10-08 PROCEDURE — 90834 PSYTX W PT 45 MINUTES: CPT | Performed by: MARRIAGE & FAMILY THERAPIST

## 2024-10-11 NOTE — PROGRESS NOTES
[facility]                                     Progress Note    Patient Name: Laya Zuleta  Date: 10/08/24           Service Type: Individual      Session Start Time: 11:04  Session End Time: 11:50     Session Length: 38 - 52 minutes    Session #: 40    Attendees: Client attended alone    Service Modality:  In Person     Treatment Plan Last Reviewed: July 30, 2024      DATA  Interactive Complexity: No  Crisis: No       Progress Since Last Session (Related to Symptoms / Goals / Homework):   Symptoms: Improving .    Homework: Achieved / completed to satisfaction with strong self-care performance.      Episode of Care Goals: Satisfactory progress - ACTION (Actively working towards change); Intervened by reinforcing change plan / affirming steps taken     Current / Ongoing Stressors and Concerns:   Low self-esteem  Family boundaries issues   Daughter's medical and mental health diagnoses   Relationship conflict   Family of origin conflict     Treatment Objective(s) Addressed in This Session:   Client will identify cognitive distortions and negative self-talk contributing to depression and anxiety.       Intervention:   Taught/reviewed cognitive distortion and negative self-talk identification, reality checking and coping strategies. Client reports her  is often on edge, leading to abrupt and negative behavior. She says she will encourage him to seek help for anxiety. She says she is concerned about an upcoming work/conference trip which is likely to trigger social anxiety. Processed emotions in session, validated, supportive counseling. Guidance offered to better utilize client's coping skills.    Assessments completed prior to visit:  The following assessments were completed by patient for this visit: none  PHQ9:       7/2/2021     3:59 PM 10/19/2022    11:33 AM 6/8/2023     1:49 PM 10/6/2023     9:50 AM 1/19/2024    12:04 PM 4/25/2024     3:35 PM 8/1/2024    12:33 PM   PHQ-9 SCORE    PHQ-9 Total Score MyChart  0        PHQ-9 Total Score 0 0 0 0 0 0 0     GAD7:       1/15/2021    10:26 AM 7/2/2021     3:59 PM 6/8/2023     1:49 PM 10/6/2023     9:50 AM 1/19/2024    12:04 PM 4/25/2024     3:35 PM 8/1/2024    12:33 PM   ZAIRA-7 SCORE   Total Score 0 0 2 3 3 2 3        ASSESSMENT: Current Emotional / Mental Status (status of significant symptoms):   Risk status (Self / Other harm or suicidal ideation)   Patient denies current fears or concerns for personal safety.   Patient denies current or recent suicidal ideation or behaviors.   Patientdenies current or recent homicidal ideation or behaviors.   Patient denies current or recent self injurious behavior or ideation.   Patient denies other safety concerns.   Patient reports there has been no change in risk factors since their last session.     Patientreports there has been no change in protective factors since their last session.     Recommended that patient call 911 or go to the local ED should there be a change in any of these risk factors.     Appearance:   Appropriate    Eye Contact:   Good    Psychomotor Behavior: Normal    Attitude:   Attentive   Orientation:   All   Speech    Rate / Production: Normal     Volume:  Normal    Mood:    Angry  Anxious    Affect:    Appropriate    Thought Content:  Clear    Thought Form:  Coherent  Logical    Insight:    Good      Medication Review:   No changes to current psychiatric medication(s)     Medication Compliance:   Yes     Changes in Health Issues:   None reported     Chemical Use Review:   Substance Use: Chemical use reviewed, no active concerns identified      Tobacco Use: No current tobacco use.      Diagnosis:  Adjustment disorder with anxiety    Collateral Reports Completed:   Not Applicable    PLAN: (Patient Tasks / Therapist Tasks / Other)  Client will use cognitive distortion and negative self-talk identification, reality checking and coping strategies.      Chung Martinez MA, LMFT                                                          ______________________________________________________________________                                                 Treatment Plan    Client's Name: Laya Zuleta  YOB: 1970    Date: July 30, 2024    DSM-V Diagnoses: 309.24 - Adjustment Disorder with Anxiety  ; V71.09 - No Diagnosis  Psychosocial / Contextual Factors: daughter with bone disease  WHODAS: 13    Referral / Collaboration:  Referral to another professional/service is not indicated at this time..    Anticipated number of session or this episode of care: maintenance    Measurable Treatment Goal(s) related to diagnosis / functional impairment(s)   I will know I've met my goal when I learn tools to cope with and lower my anxiety.     Goal 1: Client will experience a significant reduction in ZAIRA-7 scores.     Objective #A   Client will learn and integrate DBT/CBT strategies to more effectively manage emotions and relationships.   Status: Continued: July 30, 2024  Intervention(s)   Therapist will teach emotional regulation, distress tolerance, interpersonal effectiveness, and mindfulness skills. Meditation resources will be offered. Therapist will teach TIPP skills: Temperature, Intense exercise, Paced breathing, and Paired Muscle Relaxation.    Objective #B   Client will identify cognitive distortions and negative self-talk contributing to depression and anxiety.   Status: Continued: July 30, 2024  Intervention(s)   Therapist will teach cognitive distortion identification and coping strategies.    Objective #C   Client will engage in positive self-care.  Status: Continued: July 30, 2024  Intervention(s)   Therapist will teach self-care goals:  Maintain balance in schedule (time for self/others, relaxation/activities, leisure/tasks, home/out of the house), assert needs and set limits with others, challenge negative thoughts/use affirming and encouraging self-talk, engage with support people  on regular basis, practice behavior activation behaviors (sleep hygiene, balanced eating, physical activity, medical needs, personal hygiene), acknowledge and accept your feelings.    Objective #D  Client will learn and use Love and Logic parenting skills with their children.  Status: Continued: July 30, 2024  Intervention(s)  Therapist will teach Love and Logic parenting skills.      Patient has reviewed and agreed to the above plan.    Rich Martinez MA, LMFT  July 30, 2024

## 2024-10-15 SDOH — HEALTH STABILITY: PHYSICAL HEALTH: ON AVERAGE, HOW MANY MINUTES DO YOU ENGAGE IN EXERCISE AT THIS LEVEL?: 20 MIN

## 2024-10-15 SDOH — HEALTH STABILITY: PHYSICAL HEALTH: ON AVERAGE, HOW MANY DAYS PER WEEK DO YOU ENGAGE IN MODERATE TO STRENUOUS EXERCISE (LIKE A BRISK WALK)?: 3 DAYS

## 2024-10-15 ASSESSMENT — SOCIAL DETERMINANTS OF HEALTH (SDOH): HOW OFTEN DO YOU GET TOGETHER WITH FRIENDS OR RELATIVES?: ONCE A WEEK

## 2024-10-17 ENCOUNTER — OFFICE VISIT (OUTPATIENT)
Dept: PEDIATRICS | Facility: CLINIC | Age: 54
End: 2024-10-17
Attending: NURSE PRACTITIONER
Payer: COMMERCIAL

## 2024-10-17 VITALS
HEIGHT: 67 IN | DIASTOLIC BLOOD PRESSURE: 70 MMHG | WEIGHT: 154.5 LBS | OXYGEN SATURATION: 99 % | SYSTOLIC BLOOD PRESSURE: 106 MMHG | RESPIRATION RATE: 20 BRPM | HEART RATE: 88 BPM | TEMPERATURE: 97.8 F | BODY MASS INDEX: 24.25 KG/M2

## 2024-10-17 DIAGNOSIS — E66.3 OVERWEIGHT (BMI 25.0-29.9): ICD-10-CM

## 2024-10-17 PROCEDURE — 99214 OFFICE O/P EST MOD 30 MIN: CPT | Mod: 25 | Performed by: NURSE PRACTITIONER

## 2024-10-17 PROCEDURE — 91320 SARSCV2 VAC 30MCG TRS-SUC IM: CPT | Performed by: NURSE PRACTITIONER

## 2024-10-17 PROCEDURE — 90471 IMMUNIZATION ADMIN: CPT | Performed by: NURSE PRACTITIONER

## 2024-10-17 PROCEDURE — 90673 RIV3 VACCINE NO PRESERV IM: CPT | Performed by: NURSE PRACTITIONER

## 2024-10-17 PROCEDURE — 90480 ADMN SARSCOV2 VAC 1/ONLY CMP: CPT | Performed by: NURSE PRACTITIONER

## 2024-10-17 RX ORDER — SEMAGLUTIDE 1.7 MG/.75ML
1.7 INJECTION, SOLUTION SUBCUTANEOUS WEEKLY
COMMUNITY
Start: 2024-09-27 | End: 2024-10-17

## 2024-10-17 RX ORDER — SEMAGLUTIDE 1.7 MG/.75ML
1.7 INJECTION, SOLUTION SUBCUTANEOUS WEEKLY
Qty: 3 ML | Refills: 1 | Status: SHIPPED | OUTPATIENT
Start: 2024-10-17 | End: 2024-11-12

## 2024-10-17 ASSESSMENT — PAIN SCALES - GENERAL: PAINLEVEL: NO PAIN (0)

## 2024-10-17 NOTE — PROGRESS NOTES
"  ASSESSMENT/PLAN:  (E66.3) Overweight (BMI 25.0-29.9)  Comment: Has hit a plateau but is at a healthy weight. Unfortunately, she is not exercising or tracking protein  Plan:   -Emphasized that is is critical for her to start exercising again to maintain/re-gain muscle mass  -Start tracking protein. Goal 3x per day  -Recheck lipids at upcoming physical  The longitudinal plan of care for the diagnosis(es)/condition(s) as documented were addressed during this visit. Due to the added complexity in care, I will continue to support Laya in the subsequent management and with ongoing continuity of care.  Subjective:  Has started to plateau x 3 months and has increased hunger  No longer exercising    ROS: const/endo/msk otherwise negative     OBJECTIVE:  /70 (BP Location: Right arm, Patient Position: Sitting, Cuff Size: Adult Regular)   Pulse 88   Temp 97.8  F (36.6  C) (Temporal)   Resp 20   Ht 1.714 m (5' 7.48\")   Wt 70.1 kg (154 lb 8 oz)   LMP 09/15/2015 (LMP Unknown)   SpO2 99%   BMI 23.86 kg/m    CONSTITUTIONAL: Alert, well-nourished, well-groomed, NAD    30 minutes spent on this encounter    GLORIA Tyson-DNP.      "

## 2024-11-05 ENCOUNTER — OFFICE VISIT (OUTPATIENT)
Dept: PSYCHOLOGY | Facility: CLINIC | Age: 54
End: 2024-11-05
Payer: COMMERCIAL

## 2024-11-05 DIAGNOSIS — F43.22 ADJUSTMENT DISORDER WITH ANXIETY: Primary | ICD-10-CM

## 2024-11-05 PROCEDURE — 90834 PSYTX W PT 45 MINUTES: CPT | Performed by: MARRIAGE & FAMILY THERAPIST

## 2024-11-06 NOTE — PROGRESS NOTES
[facility] Surgical Follow up Visit   Date of visit: 1/4/2021  Physician: EFRAIN Bahena MD, MD  Primary Care is Suzanna Smith.  Laya Zuleta   50 year old  female     Initial Weight: 193#  Initial BMI: 29.78  Today's Weight:   Wt Readings from Last 1 Encounters:   01/04/21 83.5 kg (184 lb)     Body mass index is 28.39 kg/m .           Assessment and Plan   Assessment: Laya is a 50 year old year old female who presents for medical weight management.      Plan:     Diagnosis Comments   1. Overweight (BMI 25.0-29.9)  Patient was congratulated on her success thus far. Healthy habits to assist with further weight loss were discussed. She will try to gradually increase her exercise again now that her hips are feeling better. She will try to increase her water and work on getting adequate protein at lunch. She will continue the phentermine.   2. Trochanteric bursitis Improved with injections. She will gradually and carefully resume activity.        Follow up in 3 months with myself           INTERIM HISTORY  Patient started phentermine and feels that it is helping her. She is not having any side effects. It is helping to decrease snacking. Occasionally she doesn't take and notices that she is more hungry. She is sometimes planning her meals ahead of time    DIETARY HISTORY  Meals Per Day: 2-3  Eating Protein First?: usually  Food Diary: B:mar shrestha delite L:fruit, cottage cheese, popcorn D:chicken and vegetables  Snacks Per Day: varies  Typical Snack: varies  Fluid Intake: working on it  Portion Control: yes- using smaller plate  Calorie Containing Beverages: rare wine  Typical Protein Food Choices:turkey sausage, eggs, chicken, cottage cheese  Choosing Whole Grains: not discussed  Meals at Restaurant per week:0=2 x per week      Positive Changes Since Last Visit: working on meal planning  Struggling With: hip pain- exercise has decreased, water intake, getting adequate  "protein for lunch    Knowledgeable in Reading Food Labels: yes  Getting Adequate Protein: sometimes  Sleeping 7-8 hours/day yes  Stress management not discussed    PHYSICAL ACTIVITY PATTERNS:  Cardiovascular: walks- is struggling with bursitis in her hips, she was walking 5-6 days per week for 30-60 minutes- this has slowed down due to hip, she got cortisone shots 2 weeks ago , rare oculus training  Strength Training: plans to subscribe to a oculus training    REVIEW OF SYSTEMS  GENERAL/CONSTITUTIONAL:  No distress  CARDIOVASCULAR:  Chest Pain with Exertion: no  PULMONARY:  Dyspnea on exertion: sometimes  PSYCHIATRIC:  Moods: stable  MUSCULOSKELETAL/RHEUMATOLOGIC  Arthralgias: yes  Myalgias: no  ENDOCRINE:  Monitoring Blood Sugars: na  Sugars Well Controlled: na       Patient Profile   Social History     Social History Narrative     Not on file        Past Medical History   Past Medical History:   Diagnosis Date     Acne 11/29/2010     CARDIOVASCULAR SCREENING; LDL GOAL LESS THAN 160 11/29/2010     Patient Active Problem List   Diagnosis     Chronic maxillary sinusitis     Acne     Family history of breast cancer     Overweight (BMI 25.0-29.9)     Premenstrual dysphoric disorder     [unfilled]    Past Surgical History  She has a past surgical history that includes sinus surgery; Laparoscopic appendectomy child; and Colonoscopy (N/A, 11/19/2018).     Examination   Ht 1.715 m (5' 7.5\")   Wt 83.5 kg (184 lb)   BMI 28.39 kg/m    GENERAL: Healthy, alert and no distress  EYES: Eyes grossly normal to inspection.  No discharge or erythema, or obvious scleral/conjunctival abnormalities.  RESP: No audible wheeze, cough, or visible cyanosis.  No visible retractions or increased work of breathing.    SKIN: Visible skin clear. No significant rash, abnormal pigmentation or lesions.  NEURO: Cranial nerves grossly intact.  Mentation and speech appropriate for age.  PSYCH: Mentation appears normal, affect normal/bright, " Detail Level: Detailed Detail Level: Zone judgement and insight intact, normal speech and appearance well-groomed.         Counseling:   We reviewed the important post op bariatric recommendations:  -eating 3 meals daily  -eating protein first, getting >60gm protein daily  -eating slowly, chewing food well  -avoiding/limiting calorie containing beverages  -limiting starchy vegetables and carbohydrates, choosing wheat, not white with breads,   crackers, pastas, josue, bagels, tortillas, rice  -limiting restaurant or cafeteria eating to twice a week or less    We discussed the importance of restorative sleep and stress management in maintaining a healthy weight.  We discussed the National Weight Control Registry healthy weight maintenance strategies and ways to optimize metabolism.  We discussed the importance of physical activity including cardiovascular and strength training in maintaining a healthier weight.    TIME: 30 min spent on pre-visit planning,evaluation, management, counseling, education, & motivational interviewing and charting with greater than 50 % of the total time was spent on counseling and coordinating care        EFRAIN Bahena MD  [facility]

## 2024-11-07 ASSESSMENT — COLUMBIA-SUICIDE SEVERITY RATING SCALE - C-SSRS
SUICIDE, SINCE LAST CONTACT: NO
2. HAVE YOU ACTUALLY HAD ANY THOUGHTS OF KILLING YOURSELF?: NO
1. SINCE LAST CONTACT, HAVE YOU WISHED YOU WERE DEAD OR WISHED YOU COULD GO TO SLEEP AND NOT WAKE UP?: NO
6. HAVE YOU EVER DONE ANYTHING, STARTED TO DO ANYTHING, OR PREPARED TO DO ANYTHING TO END YOUR LIFE?: NO
TOTAL  NUMBER OF INTERRUPTED ATTEMPTS SINCE LAST CONTACT: NO
ATTEMPT SINCE LAST CONTACT: NO
TOTAL  NUMBER OF ABORTED OR SELF INTERRUPTED ATTEMPTS SINCE LAST CONTACT: NO

## 2024-11-07 ASSESSMENT — ANXIETY QUESTIONNAIRES
IF YOU CHECKED OFF ANY PROBLEMS ON THIS QUESTIONNAIRE, HOW DIFFICULT HAVE THESE PROBLEMS MADE IT FOR YOU TO DO YOUR WORK, TAKE CARE OF THINGS AT HOME, OR GET ALONG WITH OTHER PEOPLE: SOMEWHAT DIFFICULT
6. BECOMING EASILY ANNOYED OR IRRITABLE: NOT AT ALL
GAD7 TOTAL SCORE: 2
2. NOT BEING ABLE TO STOP OR CONTROL WORRYING: NOT AT ALL
7. FEELING AFRAID AS IF SOMETHING AWFUL MIGHT HAPPEN: NOT AT ALL
3. WORRYING TOO MUCH ABOUT DIFFERENT THINGS: SEVERAL DAYS
5. BEING SO RESTLESS THAT IT IS HARD TO SIT STILL: NOT AT ALL
GAD7 TOTAL SCORE: 2
1. FEELING NERVOUS, ANXIOUS, OR ON EDGE: SEVERAL DAYS

## 2024-11-07 ASSESSMENT — PATIENT HEALTH QUESTIONNAIRE - PHQ9
5. POOR APPETITE OR OVEREATING: NOT AT ALL
SUM OF ALL RESPONSES TO PHQ QUESTIONS 1-9: 0

## 2024-11-07 NOTE — PROGRESS NOTES
[facility]                                     Progress Note    Patient Name: Laya Zuleta  Date: 11/05/24           Service Type: Individual      Session Start Time: 11:02  Session End Time: 11:49     Session Length: 38 - 52 minutes    Session #: 41    Attendees: Client attended alone    Service Modality:  In Person     Treatment Plan Last Reviewed: November 5, 2024       DATA  Interactive Complexity: No  Crisis: No       Progress Since Last Session (Related to Symptoms / Goals / Homework):   Symptoms: Improving .    Homework: Achieved / completed to satisfaction with strong self-care performance.      Episode of Care Goals: Satisfactory progress - ACTION (Actively working towards change); Intervened by reinforcing change plan / affirming steps taken     Current / Ongoing Stressors and Concerns:   Low self-esteem  Family boundaries issues   Daughter's medical and mental health diagnoses   Relationship conflict   Family of origin conflict     Treatment Objective(s) Addressed in This Session:   Client will identify cognitive distortions and negative self-talk contributing to depression and anxiety.       Intervention:   Reviewed self-care goals, performance and behavioral activation strategies to overcome obstacles. Client reports her daughter has been rejeced by her small friend group over a misunderstood political comment. Discussed ays to support her duaghter. Processed emotions in session, validated, supportive counseling. Guidance offered to better utilize client's coping skills.    Assessments completed prior to visit:  The following assessments were completed by patient for this visit:   PHQ9:       10/19/2022    11:33 AM 6/8/2023     1:49 PM 10/6/2023     9:50 AM 1/19/2024    12:04 PM 4/25/2024     3:35 PM 8/1/2024    12:33 PM 11/7/2024    11:08 AM   PHQ-9 SCORE   PHQ-9 Total Score MyChart 0         PHQ-9 Total Score 0 0 0 0 0 0 0     GAD7:       7/2/2021     3:59 PM 6/8/2023      1:49 PM 10/6/2023     9:50 AM 1/19/2024    12:04 PM 4/25/2024     3:35 PM 8/1/2024    12:33 PM 11/7/2024    11:08 AM   ZAIRA-7 SCORE   Total Score 0 2 3 3 2 3 2     PROMIS 10-Global Health (only subscores and total score):       7/11/2023    11:29 AM 11/7/2024    11:08 AM   PROMIS-10 Scores Only   Global Mental Health Score 13    13 13   Global Physical Health Score 15    15 15   PROMIS TOTAL - SUBSCORES 28    28 28     Love Suicide Severity Rating Scale (Short Version)      1/15/2021    10:27 AM 7/2/2021     4:00 PM 6/8/2023     1:50 PM 10/6/2023     9:51 AM 1/19/2024    12:05 PM 8/1/2024    12:34 PM 11/7/2024    11:10 AM   Love Suicide Severity Rating (Short Version)   Over the past 2 weeks have you felt down, depressed, or hopeless? yes no        Over the past 2 weeks have you had thoughts of killing yourself? no no        Have you ever attempted to kill yourself? no no        1. Wish to be Dead (Since Last Contact)   N N N N N   2. Non-Specific Active Suicidal Thoughts (Since Last Contact)   N N N N N   Actual Attempt (Since Last Contact)   N N N N N   Has subject engaged in non-suicidal self-injurious behavior? (Since Last Contact)   N N N N N   Interrupted Attempts (Since Last Contact)   N N N N N   Aborted or Self-Interrupted Attempt (Since Last Contact)   N N N N N   Preparatory Acts or Behavior (Since Last Contact)   N N N N N   Suicide (Since Last Contact)   N N N N N   Calculated C-SSRS Risk Score (Since Last Contact)   No Risk Indicated No Risk Indicated No Risk Indicated No Risk Indicated No Risk Indicated         ASSESSMENT: Current Emotional / Mental Status (status of significant symptoms):   Risk status (Self / Other harm or suicidal ideation)   Patient denies current fears or concerns for personal safety.   Patient denies current or recent suicidal ideation or behaviors.   Patientdenies current or recent homicidal ideation or behaviors.   Patient denies current or recent self injurious  behavior or ideation.   Patient denies other safety concerns.   Patient reports there has been no change in risk factors since their last session.     Patientreports there has been no change in protective factors since their last session.     Recommended that patient call 911 or go to the local ED should there be a change in any of these risk factors     Appearance:   Appropriate    Eye Contact:   Good    Psychomotor Behavior: Normal    Attitude:   Interested   Orientation:   All   Speech    Rate / Production: Normal     Volume:  Normal    Mood:    Angry  Anxious    Affect:    Appropriate    Thought Content:  Clear    Thought Form:  Coherent  Logical    Insight:    Good      Medication Review:   No changes to current psychiatric medication(s)     Medication Compliance:   Yes     Changes in Health Issues:   None reported     Chemical Use Review:   Substance Use: Chemical use reviewed, no active concerns identified      Tobacco Use: No current tobacco use.      Diagnosis:  Adjustment disorder with anxiety    Collateral Reports Completed:   Not Applicable    PLAN: (Patient Tasks / Therapist Tasks / Other)  Client will  achieve 80% of self-care goals.        Chung Martinez MA, LMFT                                                         ______________________________________________________________________                                                 Treatment Plan    Client's Name: Laya Zuleta  YOB: 1970    Date: November 5, 2024    DSM-V Diagnoses: 309.24 - Adjustment Disorder with Anxiety  ; V71.09 - No Diagnosis  Psychosocial / Contextual Factors: daughter with bone disease  WHODAS: 13    Referral / Collaboration:  Referral to another professional/service is not indicated at this time..    Anticipated number of session or this episode of care: maintenance    Measurable Treatment Goal(s) related to diagnosis / functional impairment(s)   I will know I've met my goal when I learn tools to  cope with and lower my anxiety.     Goal 1: Client will experience a significant reduction in ZAIRA-7 scores.     Objective #A   Client will learn and integrate DBT/CBT strategies to more effectively manage emotions and relationships.   Status: Continued: November 5, 2024  Intervention(s)   Therapist will teach emotional regulation, distress tolerance, interpersonal effectiveness, and mindfulness skills. Meditation resources will be offered. Therapist will teach TIPP skills: Temperature, Intense exercise, Paced breathing, and Paired Muscle Relaxation.    Objective #B   Client will identify cognitive distortions and negative self-talk contributing to depression and anxiety.   Status: Continued: November 5, 2024  Intervention(s)   Therapist will teach cognitive distortion identification and coping strategies.    Objective #C   Client will engage in positive self-care.  Status: Continued: November 5, 2024  Intervention(s)   Therapist will teach self-care goals:  Maintain balance in schedule (time for self/others, relaxation/activities, leisure/tasks, home/out of the house), assert needs and set limits with others, challenge negative thoughts/use affirming and encouraging self-talk, engage with support people on regular basis, practice behavior activation behaviors (sleep hygiene, balanced eating, physical activity, medical needs, personal hygiene), acknowledge and accept your feelings.    Objective #D  Client will learn and use Love and Logic parenting skills with their children.  Status: Continued: November 5, 2024  Intervention(s)  Therapist will teach Love and Logic parenting skills.      Patient has reviewed and agreed to the above plan.    Rich Martinez MA, LMFT  July 30, 2024

## 2024-11-12 ENCOUNTER — ANCILLARY PROCEDURE (OUTPATIENT)
Dept: MAMMOGRAPHY | Facility: CLINIC | Age: 54
End: 2024-11-12
Attending: NURSE PRACTITIONER
Payer: COMMERCIAL

## 2024-11-12 ENCOUNTER — MYC REFILL (OUTPATIENT)
Dept: PEDIATRICS | Facility: CLINIC | Age: 54
End: 2024-11-12

## 2024-11-12 DIAGNOSIS — E66.3 OVERWEIGHT (BMI 25.0-29.9): ICD-10-CM

## 2024-11-12 DIAGNOSIS — Z12.31 VISIT FOR SCREENING MAMMOGRAM: ICD-10-CM

## 2024-11-12 PROCEDURE — 77067 SCR MAMMO BI INCL CAD: CPT | Mod: TC | Performed by: RADIOLOGY

## 2024-11-12 PROCEDURE — 77063 BREAST TOMOSYNTHESIS BI: CPT | Mod: TC | Performed by: RADIOLOGY

## 2024-11-13 RX ORDER — SEMAGLUTIDE 1.7 MG/.75ML
1.7 INJECTION, SOLUTION SUBCUTANEOUS WEEKLY
Qty: 3 ML | Refills: 1 | Status: SHIPPED | OUTPATIENT
Start: 2024-11-13

## 2024-12-03 ENCOUNTER — OFFICE VISIT (OUTPATIENT)
Dept: PSYCHOLOGY | Facility: CLINIC | Age: 54
End: 2024-12-03
Payer: COMMERCIAL

## 2024-12-03 DIAGNOSIS — F43.22 ADJUSTMENT DISORDER WITH ANXIETY: Primary | ICD-10-CM

## 2024-12-03 PROCEDURE — 90834 PSYTX W PT 45 MINUTES: CPT | Performed by: MARRIAGE & FAMILY THERAPIST

## 2024-12-26 SDOH — HEALTH STABILITY: PHYSICAL HEALTH: ON AVERAGE, HOW MANY MINUTES DO YOU ENGAGE IN EXERCISE AT THIS LEVEL?: 30 MIN

## 2024-12-26 SDOH — HEALTH STABILITY: PHYSICAL HEALTH: ON AVERAGE, HOW MANY DAYS PER WEEK DO YOU ENGAGE IN MODERATE TO STRENUOUS EXERCISE (LIKE A BRISK WALK)?: 2 DAYS

## 2024-12-26 ASSESSMENT — SOCIAL DETERMINANTS OF HEALTH (SDOH): HOW OFTEN DO YOU GET TOGETHER WITH FRIENDS OR RELATIVES?: TWICE A WEEK

## 2024-12-30 PROBLEM — F32.81 PREMENSTRUAL DYSPHORIC DISORDER: Status: RESOLVED | Noted: 2020-10-05 | Resolved: 2024-12-30

## 2024-12-31 ENCOUNTER — OFFICE VISIT (OUTPATIENT)
Dept: PSYCHOLOGY | Facility: CLINIC | Age: 54
End: 2024-12-31
Payer: COMMERCIAL

## 2024-12-31 ENCOUNTER — OFFICE VISIT (OUTPATIENT)
Dept: PEDIATRICS | Facility: CLINIC | Age: 54
End: 2024-12-31
Payer: COMMERCIAL

## 2024-12-31 VITALS
BODY MASS INDEX: 24.27 KG/M2 | OXYGEN SATURATION: 98 % | WEIGHT: 154.6 LBS | TEMPERATURE: 98 F | HEART RATE: 86 BPM | SYSTOLIC BLOOD PRESSURE: 116 MMHG | DIASTOLIC BLOOD PRESSURE: 82 MMHG | RESPIRATION RATE: 18 BRPM | HEIGHT: 67 IN

## 2024-12-31 DIAGNOSIS — Z78.0 POSTMENOPAUSAL STATUS: ICD-10-CM

## 2024-12-31 DIAGNOSIS — E66.3 OVERWEIGHT (BMI 25.0-29.9): ICD-10-CM

## 2024-12-31 DIAGNOSIS — R29.890 LOSS OF HEIGHT: ICD-10-CM

## 2024-12-31 DIAGNOSIS — Z00.00 HEALTHCARE MAINTENANCE: Primary | ICD-10-CM

## 2024-12-31 DIAGNOSIS — R73.01 IMPAIRED FASTING GLUCOSE: ICD-10-CM

## 2024-12-31 DIAGNOSIS — L70.9 ACNE, UNSPECIFIED ACNE TYPE: ICD-10-CM

## 2024-12-31 DIAGNOSIS — F43.22 ADJUSTMENT DISORDER WITH ANXIETY: Primary | ICD-10-CM

## 2024-12-31 LAB
CHOLEST SERPL-MCNC: 276 MG/DL
FASTING STATUS PATIENT QL REPORTED: NO
HDLC SERPL-MCNC: 56 MG/DL
LDLC SERPL CALC-MCNC: 189 MG/DL
NONHDLC SERPL-MCNC: 220 MG/DL
TRIGL SERPL-MCNC: 153 MG/DL

## 2024-12-31 PROCEDURE — 99214 OFFICE O/P EST MOD 30 MIN: CPT | Mod: 25 | Performed by: NURSE PRACTITIONER

## 2024-12-31 PROCEDURE — 99396 PREV VISIT EST AGE 40-64: CPT | Performed by: NURSE PRACTITIONER

## 2024-12-31 PROCEDURE — 90834 PSYTX W PT 45 MINUTES: CPT | Performed by: MARRIAGE & FAMILY THERAPIST

## 2024-12-31 PROCEDURE — 36415 COLL VENOUS BLD VENIPUNCTURE: CPT | Performed by: NURSE PRACTITIONER

## 2024-12-31 PROCEDURE — 80061 LIPID PANEL: CPT | Performed by: NURSE PRACTITIONER

## 2024-12-31 ASSESSMENT — PAIN SCALES - GENERAL: PAINLEVEL_OUTOF10: NO PAIN (0)

## 2024-12-31 NOTE — PATIENT INSTRUCTIONS
Muscle mass  Exercise and 30g protein three times a day      Z78.0 postmenopausal status  R29.890 height loss  DEXA scan

## 2024-12-31 NOTE — PROGRESS NOTES
"Preventive Care Visit  [facility]  JORGE A WHITEFLORENCE CONNOR CNP, Family Medicine  Dec 31, 2024    [place] when done  DEXA?  Spot on back- need derm?  Assessment & Plan     Healthcare maintenance    Overweight (BMI 25.0-29.9)  On Wegovy and stable at BMI<25. Pt interested in increasing due to \"food noise\" coming back. However, given BMI being under 25 and the pt not exercising or meeting protein goals, I feel the risks of increasing outweigh any potential benefit  -30g protein TID and strength training  -Follow-up 6 months, sooner if losing or gaining any weight  - Lipid panel reflex to direct LDL Fasting; Future  - Lipid panel reflex to direct LDL Fasting    Impaired fasting glucose  Resolved on Wegovy    Postmenopausal status  Loss of height  Pt requesting DEXA  - DX Bone Density; Future            Counseling  Appropriate preventive services were addressed with this patient via screening, questionnaire, or discussion as appropriate for fall prevention, nutrition, physical activity, Tobacco-use cessation, social engagement, weight loss and cognition.  Checklist reviewing preventive services available has been given to the patient.  Reviewed patient's diet, addressing concerns and/or questions.   She is at risk for lack of exercise and has been provided with information to increase physical activity for the benefit of her well-being.           Reyes Asif is a 54 year old, presenting for the following:  Annual Visit        12/31/2024     9:15 AM   Additional Questions   Roomed by alban   Accompanied by deion         12/31/2024     9:15 AM   Patient Reported Additional Medications   Patient reports taking the following new medications deion          HPI                12/26/2024   General Health   How would you rate your overall physical health? Good   Feel stress (tense, anxious, or unable to sleep) Only a little   (!) STRESS CONCERN      12/26/2024   Nutrition   Three or more " servings of calcium each day? (!) NO   Diet: Regular (no restrictions)   How many servings of fruit and vegetables per day? (!) 0-1   How many sweetened beverages each day? 0-1         12/26/2024   Exercise   Days per week of moderate/strenous exercise 2 days   Average minutes spent exercising at this level 30 min   (!) EXERCISE CONCERN      12/26/2024   Social Factors   Frequency of gathering with friends or relatives Twice a week   Worry food won't last until get money to buy more No   Food not last or not have enough money for food? No   Do you have housing? (Housing is defined as stable permanent housing and does not include staying ouside in a car, in a tent, in an abandoned building, in an overnight shelter, or couch-surfing.) Yes   Are you worried about losing your housing? No   Lack of transportation? No   Unable to get utilities (heat,electricity)? No         12/26/2024   Fall Risk   Fallen 2 or more times in the past year? No   Trouble with walking or balance? No          12/26/2024   Dental   Dentist two times every year? Yes         10/15/2024   TB Screening   Were you born outside of the [place]? No           12/26/2024   Substance Use   Alcohol more than 3/day or more than 7/wk No   Do you use any other substances recreationally? No     Social History     Tobacco Use    Smoking status: Never     Passive exposure: Never    Smokeless tobacco: Never   Vaping Use    Vaping status: Never Used   Substance Use Topics    Alcohol use: Yes     Comment: 2 times per week, 2 per time    Drug use: No           11/12/2024   LAST FHS-7 RESULTS   1st degree relative breast or ovarian cancer No   Any relative bilateral breast cancer No   Any male have breast cancer No   Any ONE woman have BOTH breast AND ovarian cancer No   Any woman with breast cancer before 50yrs No   2 or more relatives with breast AND/OR ovarian cancer No   2 or more relatives with breast AND/OR bowel cancer Yes                12/26/2024   STI Screening  "  New sexual partner(s) since last STI/HIV test? No           Latest Ref Rng & Units 2/22/2022    11:55 AM 7/13/2017     1:46 PM 7/13/2017    11:48 AM   PAP / HPV   PAP  Negative for Intraepithelial Lesion or Malignancy (NILM)      PAP (Historical)    NIL    HPV 16 DNA Negative Negative  Negative     HPV 18 DNA Negative Negative  Negative     Other HR HPV Negative Negative  Negative       ASCVD Risk   The 10-year ASCVD risk score (Heena ALLEN, et al., 2019) is: 1.9%    Values used to calculate the score:      Age: 54 years      Sex: Female      Is Non- : No      Diabetic: No      Tobacco smoker: No      Systolic Blood Pressure: 106 mmHg      Is BP treated: No      HDL Cholesterol: 51 mg/dL      Total Cholesterol: 250 mg/dL           Reviewed and updated as needed this visit by Provider                          Review of Systems  Constitutional, neuro, ENT, endocrine, pulmonary, cardiac, gastrointestinal, genitourinary, musculoskeletal, integument and psychiatric systems are negative, except as otherwise noted.     Objective    Exam  LMP 09/15/2015 (LMP Unknown)    Estimated body mass index is 23.86 kg/m  as calculated from the following:    Height as of 10/17/24: 1.714 m (5' 7.48\").    Weight as of 10/17/24: 70.1 kg (154 lb 8 oz).    Physical Exam  GENERAL: alert and no distress  EYES: Eyes grossly normal to inspection, PERRL and conjunctivae and sclerae normal  HENT: ear canals and TM's normal, nose and mouth without ulcers or lesions  NECK: no adenopathy, no asymmetry, masses, or scars  RESP: lungs clear to auscultation - no rales, rhonchi or wheezes  CV: regular rate and rhythm, normal S1 S2, no S3 or S4, no murmur, click or rub, no peripheral edema  ABDOMEN: soft, nontender, no hepatosplenomegaly, no masses and bowel sounds normal  MS: no gross musculoskeletal defects noted, no edema  SKIN: no suspicious lesions or rashes  NEURO: Normal strength and tone, mentation intact and " speech normal  PSYCH: mentation appears normal, affect normal/bright        Signed Electronically by: FLORENCE JACOME CNP

## 2025-01-02 ENCOUNTER — PATIENT OUTREACH (OUTPATIENT)
Dept: CARE COORDINATION | Facility: CLINIC | Age: 55
End: 2025-01-02
Payer: COMMERCIAL

## 2025-01-06 NOTE — PROGRESS NOTES
[facility]                                     Progress Note    Patient Name: Laya Zuleta  Date: 12/31/24           Service Type: Individual      Session Start Time: 11:00  Session End Time: 11:46     Session Length: 38 - 52 minutes    Session #: 45    Attendees: Client attended alone    Service Modality:  In Person     Treatment Plan Last Reviewed: November 5, 2024       DATA  Interactive Complexity: No  Crisis: No       Progress Since Last Session (Related to Symptoms / Goals / Homework):   Symptoms: Improving .    Homework: Achieved / completed to satisfaction with strong self-care performance.      Episode of Care Goals: Satisfactory progress - ACTION (Actively working towards change); Intervened by reinforcing change plan / affirming steps taken     Current / Ongoing Stressors and Concerns:   Low self-esteem  Family boundaries issues   Daughter's medical and mental health diagnoses   Relationship conflict   Family of origin conflict     Treatment Objective(s) Addressed in This Session:   Client will learn and integrate DBT/CBT strategies to more effectively manage emotions and relationships.       Intervention:   Taught/reviewed/role-played interpersonal effectiveness, communication, negotiation and boundary setting skills. Client reports she was invited to a holiday dinner of family and friends. She says at the dinner two people brought and had prepared luxury food items that they did not share with anyone else. She says she is concerned that her intense reaction and subsequent difficulty letting this go is over-reacting. Discussed amplifying factors from her history and discussed next steps. Processed emotions in session, validated, supportive counseling. Guidance offered to better utilize client's coping skills.    Assessments completed prior to visit:  The following assessments were completed by patient for this visit:   PHQ9:       10/19/2022    11:33 AM 6/8/2023     1:49 PM  10/6/2023     9:50 AM 1/19/2024    12:04 PM 4/25/2024     3:35 PM 8/1/2024    12:33 PM 11/7/2024    11:08 AM   PHQ-9 SCORE   PHQ-9 Total Score MyChart 0         PHQ-9 Total Score 0 0 0 0 0 0 0     GAD7:       7/2/2021     3:59 PM 6/8/2023     1:49 PM 10/6/2023     9:50 AM 1/19/2024    12:04 PM 4/25/2024     3:35 PM 8/1/2024    12:33 PM 11/7/2024    11:08 AM   ZAIRA-7 SCORE   Total Score 0 2 3 3 2 3 2     PROMIS 10-Global Health (only subscores and total score):       7/11/2023    11:29 AM 11/7/2024    11:08 AM 12/26/2024     9:08 AM   PROMIS-10 Scores Only   Global Mental Health Score 13    13 13 13    Global Physical Health Score 15    15 15 18    PROMIS TOTAL - SUBSCORES 28    28 28 31        Patient-reported     [place] Suicide Severity Rating Scale (Short Version)      1/15/2021    10:27 AM 7/2/2021     4:00 PM 6/8/2023     1:50 PM 10/6/2023     9:51 AM 1/19/2024    12:05 PM 8/1/2024    12:34 PM 11/7/2024    11:10 AM   [place] Suicide Severity Rating (Short Version)   Over the past 2 weeks have you felt down, depressed, or hopeless? yes no        Over the past 2 weeks have you had thoughts of killing yourself? no no        Have you ever attempted to kill yourself? no no        1. Wish to be Dead (Since Last Contact)   N N N N N   2. Non-Specific Active Suicidal Thoughts (Since Last Contact)   N N N N N   Actual Attempt (Since Last Contact)   N N N N N   Has subject engaged in non-suicidal self-injurious behavior? (Since Last Contact)   N N N N N   Interrupted Attempts (Since Last Contact)   N N N N N   Aborted or Self-Interrupted Attempt (Since Last Contact)   N N N N N   Preparatory Acts or Behavior (Since Last Contact)   N N N N N   Suicide (Since Last Contact)   N N N N N   Calculated C-SSRS Risk Score (Since Last Contact)   No Risk Indicated No Risk Indicated No Risk Indicated No Risk Indicated No Risk Indicated         ASSESSMENT: Current Emotional / Mental Status (status of significant symptoms):   Risk  status (Self / Other harm or suicidal ideation)   Patient denies current fears or concerns for personal safety.   Patient denies current or recent suicidal ideation or behaviors.   Patientdenies current or recent homicidal ideation or behaviors.   Patient denies current or recent self injurious behavior or ideation.   Patient denies other safety concerns.   Patient reports there has been no change in risk factors since their last session.     Patientreports there has been no change in protective factors since their last session.     Recommended that patient call 911 or go to the local ED should there be a change in any of these risk factors     Appearance:   Appropriate    Eye Contact:   Good    Psychomotor Behavior: Normal    Attitude:   Ricardo   Orientation:   All   Speech    Rate / Production: Normal     Volume:  Normal    Mood:    Angry    Affect:    Appropriate    Thought Content:  Clear    Thought Form:  Coherent  Logical    Insight:    Good      Medication Review:   No changes to current psychiatric medication(s)     Medication Compliance:   Yes     Changes in Health Issues:   None reported     Chemical Use Review:   Substance Use: Chemical use reviewed, no active concerns identified      Tobacco Use: No current tobacco use.      Diagnosis:  Adjustment disorder with anxiety    Collateral Reports Completed:   Not Applicable    PLAN: (Patient Tasks / Therapist Tasks / Other)  Client will  use interpersonal effectiveness, communication, negotiation and boundary setting skills regarding social invitations.      Chung Martinez MA, LMFT                                                         ______________________________________________________________________                                                 Treatment Plan    Client's Name: Laya Zuleta  YOB: 1970    Date: November 5, 2024    DSM-V Diagnoses: 309.24 - Adjustment Disorder with Anxiety  ; V71.09 - No Diagnosis  Psychosocial /  Contextual Factors: daughter with bone disease  WHODAS: 13    Referral / Collaboration:  Referral to another professional/service is not indicated at this time..    Anticipated number of session or this episode of care: maintenance    Measurable Treatment Goal(s) related to diagnosis / functional impairment(s)   I will know I've met my goal when I learn tools to cope with and lower my anxiety.     Goal 1: Client will experience a significant reduction in ZAIRA-7 scores.     Objective #A   Client will learn and integrate DBT/CBT strategies to more effectively manage emotions and relationships.   Status: Continued: November 5, 2024  Intervention(s)   Therapist will teach emotional regulation, distress tolerance, interpersonal effectiveness, and mindfulness skills. Meditation resources will be offered. Therapist will teach TIPP skills: Temperature, Intense exercise, Paced breathing, and Paired Muscle Relaxation.    Objective #B   Client will identify cognitive distortions and negative self-talk contributing to depression and anxiety.   Status: Continued: November 5, 2024  Intervention(s)   Therapist will teach cognitive distortion identification and coping strategies.    Objective #C   Client will engage in positive self-care.  Status: Continued: November 5, 2024  Intervention(s)   Therapist will teach self-care goals:  Maintain balance in schedule (time for self/others, relaxation/activities, leisure/tasks, home/out of the house), assert needs and set limits with others, challenge negative thoughts/use affirming and encouraging self-talk, engage with support people on regular basis, practice behavior activation behaviors (sleep hygiene, balanced eating, physical activity, medical needs, personal hygiene), acknowledge and accept your feelings.    Objective #D  Client will learn and use Love and Logic parenting skills with their children.  Status: Continued: November 5, 2024  Intervention(s)  Therapist will teach Love and  Logic parenting skills.      Patient has reviewed and agreed to the above plan.    Rich Martinez MA, LMFT  November 5, 2024

## 2025-01-13 ENCOUNTER — MYC REFILL (OUTPATIENT)
Dept: PEDIATRICS | Facility: CLINIC | Age: 55
End: 2025-01-13
Payer: COMMERCIAL

## 2025-01-13 DIAGNOSIS — E66.3 OVERWEIGHT (BMI 25.0-29.9): ICD-10-CM

## 2025-01-13 RX ORDER — SEMAGLUTIDE 1.7 MG/.75ML
1.7 INJECTION, SOLUTION SUBCUTANEOUS WEEKLY
Qty: 4 ML | Refills: 0 | Status: SHIPPED | OUTPATIENT
Start: 2025-01-13

## 2025-01-13 RX ORDER — SEMAGLUTIDE 1.7 MG/.75ML
1.7 INJECTION, SOLUTION SUBCUTANEOUS WEEKLY
Qty: 3 ML | Refills: 1 | OUTPATIENT
Start: 2025-01-13

## 2025-01-14 ENCOUNTER — OFFICE VISIT (OUTPATIENT)
Dept: PSYCHOLOGY | Facility: CLINIC | Age: 55
End: 2025-01-14
Payer: COMMERCIAL

## 2025-01-14 DIAGNOSIS — F43.22 ADJUSTMENT DISORDER WITH ANXIETY: Primary | ICD-10-CM

## 2025-01-14 PROCEDURE — 90834 PSYTX W PT 45 MINUTES: CPT | Performed by: MARRIAGE & FAMILY THERAPIST

## 2025-01-16 NOTE — PROGRESS NOTES
[facility]                                     Progress Note    Patient Name: Laya Zuleta  Date: 1/14/25           Service Type: Individual      Session Start Time: 12:00  Session End Time: 12:48     Session Length: 38 - 52 minutes    Session #: 45    Attendees: Client attended alone    Service Modality:  In Person     Treatment Plan Last Reviewed: November 5, 2024       DATA  Interactive Complexity: No  Crisis: No       Progress Since Last Session (Related to Symptoms / Goals / Homework):   Symptoms: Improving .    Homework: Achieved / completed to satisfaction with strong self-care performance.      Episode of Care Goals: Satisfactory progress - ACTION (Actively working towards change); Intervened by reinforcing change plan / affirming steps taken     Current / Ongoing Stressors and Concerns:   Low self-esteem  Family boundaries issues   Daughter's medical and mental health diagnoses   Relationship conflict   Family of origin conflict     Treatment Objective(s) Addressed in This Session:   Client will learn and use Love and Logic parenting skills with their children.     Intervention:   Taught/reviewed Love and Logic parenting skills. Client reports she is questioning what behaviors of her daughter's are typical for her age and what is due to autism. Chain analyses of recent parenting interactions in light of Love and Logic techniques. Processed emotions in session, validated, supportive counseling. Guidance offered to better utilize client's coping skills.    Assessments completed prior to visit:  The following assessments were completed by patient for this visit:   PHQ9:       10/19/2022    11:33 AM 6/8/2023     1:49 PM 10/6/2023     9:50 AM 1/19/2024    12:04 PM 4/25/2024     3:35 PM 8/1/2024    12:33 PM 11/7/2024    11:08 AM   PHQ-9 SCORE   PHQ-9 Total Score MyChart 0         PHQ-9 Total Score 0 0 0 0 0 0 0     GAD7:       7/2/2021     3:59 PM 6/8/2023     1:49 PM 10/6/2023      9:50 AM 1/19/2024    12:04 PM 4/25/2024     3:35 PM 8/1/2024    12:33 PM 11/7/2024    11:08 AM   ZAIRA-7 SCORE   Total Score 0 2 3 3 2 3 2     PROMIS 10-Global Health (only subscores and total score):       7/11/2023    11:29 AM 11/7/2024    11:08 AM 12/26/2024     9:08 AM   PROMIS-10 Scores Only   Global Mental Health Score 13    13 13 13    Global Physical Health Score 15    15 15 18    PROMIS TOTAL - SUBSCORES 28    28 28 31        Patient-reported     [place] Suicide Severity Rating Scale (Short Version)      1/15/2021    10:27 AM 7/2/2021     4:00 PM 6/8/2023     1:50 PM 10/6/2023     9:51 AM 1/19/2024    12:05 PM 8/1/2024    12:34 PM 11/7/2024    11:10 AM   [place] Suicide Severity Rating (Short Version)   Over the past 2 weeks have you felt down, depressed, or hopeless? yes no        Over the past 2 weeks have you had thoughts of killing yourself? no no        Have you ever attempted to kill yourself? no no        1. Wish to be Dead (Since Last Contact)   N N N N N   2. Non-Specific Active Suicidal Thoughts (Since Last Contact)   N N N N N   Actual Attempt (Since Last Contact)   N N N N N   Has subject engaged in non-suicidal self-injurious behavior? (Since Last Contact)   N N N N N   Interrupted Attempts (Since Last Contact)   N N N N N   Aborted or Self-Interrupted Attempt (Since Last Contact)   N N N N N   Preparatory Acts or Behavior (Since Last Contact)   N N N N N   Suicide (Since Last Contact)   N N N N N   Calculated C-SSRS Risk Score (Since Last Contact)   No Risk Indicated No Risk Indicated No Risk Indicated No Risk Indicated No Risk Indicated         ASSESSMENT: Current Emotional / Mental Status (status of significant symptoms):   Risk status (Self / Other harm or suicidal ideation)   Patient denies current fears or concerns for personal safety.   Patient denies current or recent suicidal ideation or behaviors.   Patientdenies current or recent homicidal ideation or behaviors.   Patient denies  current or recent self injurious behavior or ideation.   Patient denies other safety concerns.   Patient reports there has been no change in risk factors since their last session.     Patientreports there has been no change in protective factors since their last session.     Recommended that patient call 911 or go to the local ED should there be a change in any of these risk factors     Appearance:   Appropriate    Eye Contact:   Good    Psychomotor Behavior: Normal    Attitude:   Attentive   Orientation:   All   Speech    Rate / Production: Normal     Volume:  Normal    Mood:    Anxious    Affect:    Appropriate    Thought Content:  Clear    Thought Form:  Coherent  Logical    Insight:    Good      Medication Review:   No changes to current psychiatric medication(s)     Medication Compliance:   Yes     Changes in Health Issues:   None reported     Chemical Use Review:   Substance Use: Chemical use reviewed, no active concerns identified      Tobacco Use: No current tobacco use.      Diagnosis:  Adjustment disorder with anxiety    Collateral Reports Completed:   Not Applicable    PLAN: (Patient Tasks / Therapist Tasks / Other)  Client will  use Love and Logic parenting skills with her daughter.        Chung Martinez MA, [facility]                                                         ______________________________________________________________________                                                 Treatment Plan    Client's Name: Laya Zuleta  YOB: 1970    Date: November 5, 2024    DSM-V Diagnoses: 309.24 - Adjustment Disorder with Anxiety  ; V71.09 - No Diagnosis  Psychosocial / Contextual Factors: daughter with bone disease  WHODAS: 13    Referral / Collaboration:  Referral to another professional/service is not indicated at this time..    Anticipated number of session or this episode of care: maintenance    Measurable Treatment Goal(s) related to diagnosis / functional impairment(s)   I  will know I've met my goal when I learn tools to cope with and lower my anxiety.     Goal 1: Client will experience a significant reduction in ZAIRA-7 scores.     Objective #A   Client will learn and integrate DBT/CBT strategies to more effectively manage emotions and relationships.   Status: Continued: November 5, 2024  Intervention(s)   Therapist will teach emotional regulation, distress tolerance, interpersonal effectiveness, and mindfulness skills. Meditation resources will be offered. Therapist will teach TIPP skills: Temperature, Intense exercise, Paced breathing, and Paired Muscle Relaxation.    Objective #B   Client will identify cognitive distortions and negative self-talk contributing to depression and anxiety.   Status: Continued: November 5, 2024  Intervention(s)   Therapist will teach cognitive distortion identification and coping strategies.    Objective #C   Client will engage in positive self-care.  Status: Continued: November 5, 2024  Intervention(s)   Therapist will teach self-care goals:  Maintain balance in schedule (time for self/others, relaxation/activities, leisure/tasks, home/out of the house), assert needs and set limits with others, challenge negative thoughts/use affirming and encouraging self-talk, engage with support people on regular basis, practice behavior activation behaviors (sleep hygiene, balanced eating, physical activity, medical needs, personal hygiene), acknowledge and accept your feelings.    Objective #D  Client will learn and use Love and Logic parenting skills with their children.  Status: Continued: November 5, 2024  Intervention(s)  Therapist will teach Love and Logic parenting skills.      Patient has reviewed and agreed to the above plan.    Rich Martinez MA, LMFT  November 5, 2024

## 2025-02-17 RX ORDER — BUPROPION HYDROCHLORIDE 300 MG/1
300 TABLET ORAL EVERY MORNING
Qty: 90 TABLET | Refills: 0 | Status: SHIPPED | OUTPATIENT
Start: 2025-02-17

## 2025-02-17 NOTE — TELEPHONE ENCOUNTER
Looks like pt has been on this for weight  Also now on GLP-1  Pt can continue on this or if she would like to discuss tapering off would recommend scheduling virtual visit with me or Suzanna to discuss  FLORENCE Briones, CNP

## 2025-02-17 NOTE — TELEPHONE ENCOUNTER
Called the patient at [phone] and left a message requesting a call back   - Provided call back number   - Awaiting a call back at this time     Cadence GARCIA RN   [facility]

## 2025-02-18 ENCOUNTER — MYC MEDICAL ADVICE (OUTPATIENT)
Dept: PEDIATRICS | Facility: CLINIC | Age: 55
End: 2025-02-18
Payer: COMMERCIAL

## 2025-02-18 NOTE — TELEPHONE ENCOUNTER
She can stay on both if she'd like  I just thought maybe she would want to get rid of the wellbutrin now that she is on the GLP-1 and weight is stable  She can just follow-up for her regular physical if she doesn't want to change anything

## 2025-02-18 NOTE — TELEPHONE ENCOUNTER
RN attempted to reach patient. Left voicemail to call back and speak with a nurse.   MC message sent.     When patient calls back   - relay Lesly's message    Conchis Sears RN

## 2025-02-18 NOTE — TELEPHONE ENCOUNTER
Sent a 24 Quan message to the patient   - Informed the patient of Lesly Orozco NP's comments/recommendations     Cadence GARCIA RN   [facility]

## 2025-02-20 ENCOUNTER — ANCILLARY PROCEDURE (OUTPATIENT)
Dept: BONE DENSITY | Facility: CLINIC | Age: 55
End: 2025-02-20
Attending: NURSE PRACTITIONER
Payer: COMMERCIAL

## 2025-02-20 DIAGNOSIS — Z78.0 POSTMENOPAUSAL STATUS: ICD-10-CM

## 2025-02-20 DIAGNOSIS — R29.890 LOSS OF HEIGHT: ICD-10-CM

## 2025-02-20 PROCEDURE — 77080 DXA BONE DENSITY AXIAL: CPT | Mod: TC | Performed by: PHYSICIAN ASSISTANT

## 2025-02-21 PROBLEM — M85.89 OSTEOPENIA OF MULTIPLE SITES: Status: ACTIVE | Noted: 2025-02-21

## 2025-03-11 ENCOUNTER — OFFICE VISIT (OUTPATIENT)
Dept: PSYCHOLOGY | Facility: CLINIC | Age: 55
End: 2025-03-11
Payer: COMMERCIAL

## 2025-03-11 ENCOUNTER — MYC REFILL (OUTPATIENT)
Dept: PEDIATRICS | Facility: CLINIC | Age: 55
End: 2025-03-11
Payer: COMMERCIAL

## 2025-03-11 DIAGNOSIS — E66.3 OVERWEIGHT (BMI 25.0-29.9): ICD-10-CM

## 2025-03-11 DIAGNOSIS — F43.22 ADJUSTMENT DISORDER WITH ANXIETY: Primary | ICD-10-CM

## 2025-03-11 PROCEDURE — 90834 PSYTX W PT 45 MINUTES: CPT | Performed by: MARRIAGE & FAMILY THERAPIST

## 2025-03-11 RX ORDER — SEMAGLUTIDE 1.7 MG/.75ML
1.7 INJECTION, SOLUTION SUBCUTANEOUS WEEKLY
Qty: 4 ML | Refills: 0 | Status: SHIPPED | OUTPATIENT
Start: 2025-03-11

## 2025-03-12 NOTE — PROGRESS NOTES
[facility]                                     Progress Note    Patient Name: Laya Zuleta  Date: 3/11/25           Service Type: Individual      Session Start Time: 12:00  Session End Time: 12:39     Session Length: 38 - 52 minutes    Session #: 47    Attendees: Client attended alone    Service Modality:  In Person     Treatment Plan Last Reviewed: February 18, 2025        DATA  Interactive Complexity: No  Crisis: No       Progress Since Last Session (Related to Symptoms / Goals / Homework):   Symptoms: Improving .    Homework: Achieved / completed to satisfaction with strong self-care performance.      Episode of Care Goals: Satisfactory progress - ACTION (Actively working towards change); Intervened by reinforcing change plan / affirming steps taken     Current / Ongoing Stressors and Concerns:   Low self-esteem  Family boundaries issues   Daughter's medical and mental health diagnoses   Relationship conflict   Family of origin conflict     Treatment Objective(s) Addressed in This Session:   Client will learn and integrate DBT/CBT strategies to more effectively manage emotions and relationships.      Intervention:   Taught/reviewed/role-played interpersonal effectiveness, communication, negotiation and boundary setting skills. Client reports she is concerned about a social gathering playing bridge which will likely trigger her 's visible irritability that is not well hidden or diplomatic. She says that despite his attempts to curtail this behavior, he has difficulty controlling it. Discussed options. She says she is concerns about the logistics associated with a gathering of women's friend in [place] and how sleeping arrangements are negotiated. Processed emotions in session, validated, supportive counseling. Guidance offered to better utilize client's coping skills.    Assessments completed prior to visit:  The following assessments were completed by patient for this visit:    PHQ9:       6/8/2023     1:49 PM 10/6/2023     9:50 AM 1/19/2024    12:04 PM 4/25/2024     3:35 PM 8/1/2024    12:33 PM 11/7/2024    11:08 AM 2/21/2025     9:19 AM   PHQ-9 SCORE   PHQ-9 Total Score 0 0 0 0 0 0 0     GAD7:       6/8/2023     1:49 PM 10/6/2023     9:50 AM 1/19/2024    12:04 PM 4/25/2024     3:35 PM 8/1/2024    12:33 PM 11/7/2024    11:08 AM 2/21/2025     9:19 AM   ZAIRA-7 SCORE   Total Score 2 3 3 2 3 2 3     PROMIS 10-Global Health (only subscores and total score):       7/11/2023    11:29 AM 11/7/2024    11:08 AM 12/26/2024     9:08 AM   PROMIS-10 Scores Only   Global Mental Health Score 13    13 13 13    Global Physical Health Score 15    15 15 18    PROMIS TOTAL - SUBSCORES 28    28 28 31        Patient-reported     [place] Suicide Severity Rating Scale (Short Version)      1/15/2021    10:27 AM 7/2/2021     4:00 PM 6/8/2023     1:50 PM 10/6/2023     9:51 AM 1/19/2024    12:05 PM 8/1/2024    12:34 PM 11/7/2024    11:10 AM   [place] Suicide Severity Rating (Short Version)   Over the past 2 weeks have you felt down, depressed, or hopeless? yes no        Over the past 2 weeks have you had thoughts of killing yourself? no no        Have you ever attempted to kill yourself? no no        1. Wish to be Dead (Since Last Contact)   N N N N N   2. Non-Specific Active Suicidal Thoughts (Since Last Contact)   N N N N N   Actual Attempt (Since Last Contact)   N N N N N   Has subject engaged in non-suicidal self-injurious behavior? (Since Last Contact)   N N N N N   Interrupted Attempts (Since Last Contact)   N N N N N   Aborted or Self-Interrupted Attempt (Since Last Contact)   N N N N N   Preparatory Acts or Behavior (Since Last Contact)   N N N N N   Suicide (Since Last Contact)   N N N N N   Calculated C-SSRS Risk Score (Since Last Contact)   No Risk Indicated No Risk Indicated No Risk Indicated No Risk Indicated No Risk Indicated         ASSESSMENT: Current Emotional / Mental Status (status of  significant symptoms):   Risk status (Self / Other harm or suicidal ideation)   Patient denies current fears or concerns for personal safety.   Patient denies current or recent suicidal ideation or behaviors.   Patientdenies current or recent homicidal ideation or behaviors.   Patient denies current or recent self injurious behavior or ideation.   Patient denies other safety concerns.   Patient reports there has been no change in risk factors since their last session.     Patientreports there has been no change in protective factors since their last session.     Recommended that patient call 911 or go to the local ED should there be a change in any of these risk factors     Appearance:   Appropriate    Eye Contact:   Good    Psychomotor Behavior: Normal    Attitude:   Attentive   Orientation:   All   Speech    Rate / Production: Normal     Volume:  Normal    Mood:    Anxious    Affect:    Appropriate    Thought Content:  Clear    Thought Form:  Coherent  Logical    Insight:    Good      Medication Review:   No changes to current psychiatric medication(s)     Medication Compliance:   Yes     Changes in Health Issues:   None reported     Chemical Use Review:   Substance Use: Chemical use reviewed, no active concerns identified      Tobacco Use: No current tobacco use.      Diagnosis:  Adjustment disorder with anxiety    Collateral Reports Completed:   Not Applicable    PLAN: (Patient Tasks / Therapist Tasks / Other)  Client will  use interpersonal effectiveness, communication, negotiation and boundary setting skills with her  and friend group.        Chung Martinez MA, LMFT                                                         ______________________________________________________________________                                                 Treatment Plan    Client's Name: Laya Zuelta  YOB: 1970    Date: February 18, 2025    DSM-V Diagnoses: 309.24 - Adjustment Disorder with  Anxiety  ; V71.09 - No Diagnosis  Psychosocial / Contextual Factors: daughter with bone disease  WHODAS: 13    Referral / Collaboration:  Referral to another professional/service is not indicated at this time..    Anticipated number of session or this episode of care: maintenance    Measurable Treatment Goal(s) related to diagnosis / functional impairment(s)   I will know I've met my goal when I learn tools to cope with and lower my anxiety.     Goal 1: Client will experience a significant reduction in ZAIRA-7 scores.     Objective #A   Client will learn and integrate DBT/CBT strategies to more effectively manage emotions and relationships.   Status: Continued: February 18, 2025  Intervention(s)   Therapist will teach emotional regulation, distress tolerance, interpersonal effectiveness, and mindfulness skills. Meditation resources will be offered. Therapist will teach TIPP skills: Temperature, Intense exercise, Paced breathing, and Paired Muscle Relaxation.    Objective #B   Client will identify cognitive distortions and negative self-talk contributing to depression and anxiety.   Status: Continued: February 18, 2025  Intervention(s)   Therapist will teach cognitive distortion identification and coping strategies.    Objective #C   Client will engage in positive self-care.  Status: Continued: February 18, 2025  Intervention(s)   Therapist will teach self-care goals:  Maintain balance in schedule (time for self/others, relaxation/activities, leisure/tasks, home/out of the house), assert needs and set limits with others, challenge negative thoughts/use affirming and encouraging self-talk, engage with support people on regular basis, practice behavior activation behaviors (sleep hygiene, balanced eating, physical activity, medical needs, personal hygiene), acknowledge and accept your feelings.    Objective #D  Client will learn and use Love and Logic parenting skills with their children.  Status: Continued: February 18,  2025  Intervention(s)  Therapist will teach Love and Logic parenting skills.      Patient has reviewed and agreed to the above plan.    Rich Martinez MA, LMFT  February 18, 2025

## 2025-03-25 ENCOUNTER — OFFICE VISIT (OUTPATIENT)
Dept: PSYCHOLOGY | Facility: CLINIC | Age: 55
End: 2025-03-25
Payer: COMMERCIAL

## 2025-03-25 DIAGNOSIS — F43.22 ADJUSTMENT DISORDER WITH ANXIETY: Primary | ICD-10-CM

## 2025-03-25 PROCEDURE — 90834 PSYTX W PT 45 MINUTES: CPT | Performed by: MARRIAGE & FAMILY THERAPIST

## 2025-03-26 NOTE — PROGRESS NOTES
[facility]                                     Progress Note    Patient Name: Laya Zuleta  Date: 3/25/25           Service Type: Individual      Session Start Time: 12:02  Session End Time: 12:50     Session Length: 38 - 52 minutes    Session #: 48    Attendees: Client attended alone    Service Modality:  In Person     Treatment Plan Last Reviewed: February 18, 2025        DATA  Interactive Complexity: No  Crisis: No       Progress Since Last Session (Related to Symptoms / Goals / Homework):   Symptoms:  Stable     Homework: Achieved / completed to satisfaction with strong self-care performance.      Episode of Care Goals: Satisfactory progress - ACTION (Actively working towards change); Intervened by reinforcing change plan / affirming steps taken     Current / Ongoing Stressors and Concerns:   Low self-esteem  Family boundaries issues   Daughter's medical and mental health diagnoses   Relationship conflict   Family of origin conflict     Treatment Objective(s) Addressed in This Session:   Client will learn and integrate DBT/CBT strategies to more effectively manage emotions and relationships.      Intervention:   Taught/reviewed/role-played interpersonal effectiveness, communication, negotiation and boundary setting skills. Client reports she is concerned about a social gathering gathering of women friend [facility] and how sleeping arrangements are negotiated. Processed emotions in session, validated, supportive counseling. Guidance offered to better utilize client's coping skills.    Assessments completed prior to visit:  The following assessments were completed by patient for this visit:   PHQ9:       6/8/2023     1:49 PM 10/6/2023     9:50 AM 1/19/2024    12:04 PM 4/25/2024     3:35 PM 8/1/2024    12:33 PM 11/7/2024    11:08 AM 2/21/2025     9:19 AM   PHQ-9 SCORE   PHQ-9 Total Score 0 0 0 0 0 0 0     GAD7:       6/8/2023     1:49 PM 10/6/2023     9:50 AM 1/19/2024    12:04 PM  4/25/2024     3:35 PM 8/1/2024    12:33 PM 11/7/2024    11:08 AM 2/21/2025     9:19 AM   ZAIRA-7 SCORE   Total Score 2 3 3 2 3 2 3     PROMIS 10-Global Health (only subscores and total score):       7/11/2023    11:29 AM 11/7/2024    11:08 AM 12/26/2024     9:08 AM   PROMIS-10 Scores Only   Global Mental Health Score 13    13 13 13    Global Physical Health Score 15    15 15 18    PROMIS TOTAL - SUBSCORES 28    28 28 31        Patient-reported     [place] Suicide Severity Rating Scale (Short Version)      1/15/2021    10:27 AM 7/2/2021     4:00 PM 6/8/2023     1:50 PM 10/6/2023     9:51 AM 1/19/2024    12:05 PM 8/1/2024    12:34 PM 11/7/2024    11:10 AM   [place] Suicide Severity Rating (Short Version)   Over the past 2 weeks have you felt down, depressed, or hopeless? yes no        Over the past 2 weeks have you had thoughts of killing yourself? no no        Have you ever attempted to kill yourself? no no        1. Wish to be Dead (Since Last Contact)   N N N N N   2. Non-Specific Active Suicidal Thoughts (Since Last Contact)   N N N N N   Actual Attempt (Since Last Contact)   N N N N N   Has subject engaged in non-suicidal self-injurious behavior? (Since Last Contact)   N N N N N   Interrupted Attempts (Since Last Contact)   N N N N N   Aborted or Self-Interrupted Attempt (Since Last Contact)   N N N N N   Preparatory Acts or Behavior (Since Last Contact)   N N N N N   Suicide (Since Last Contact)   N N N N N   Calculated C-SSRS Risk Score (Since Last Contact)   No Risk Indicated No Risk Indicated No Risk Indicated No Risk Indicated No Risk Indicated         ASSESSMENT: Current Emotional / Mental Status (status of significant symptoms):   Risk status (Self / Other harm or suicidal ideation)   Patient denies current fears or concerns for personal safety.   Patient denies current or recent suicidal ideation or behaviors.   Patientdenies current or recent homicidal ideation or behaviors.   Patient denies current or  recent self injurious behavior or ideation.   Patient denies other safety concerns.   Patient reports there has been no change in risk factors since their last session.     Patientreports there has been no change in protective factors since their last session.     Recommended that patient call 911 or go to the local ED should there be a change in any of these risk factors     Appearance:   Appropriate    Eye Contact:   Good    Psychomotor Behavior: Normal    Attitude:   Friendly   Orientation:   All   Speech    Rate / Production: Normal     Volume:  Normal    Mood:    Anxious    Affect:    Appropriate    Thought Content:  Clear    Thought Form:  Coherent  Logical    Insight:    Good      Medication Review:   No changes to current psychiatric medication(s)     Medication Compliance:   Yes     Changes in Health Issues:   None reported     Chemical Use Review:   Substance Use: Chemical use reviewed, no active concerns identified      Tobacco Use: No current tobacco use.      Diagnosis:  Adjustment disorder with anxiety    Collateral Reports Completed:   Not Applicable    PLAN: (Patient Tasks / Therapist Tasks / Other)  Client will  use interpersonal effectiveness, communication, negotiation and boundary setting skills with her  and friend group.        Chung Martinez MA, [facility]                                                         ______________________________________________________________________                                                 Treatment Plan    Client's Name: Laya Zuleta  YOB: 1970    Date: February 18, 2025    DSM-V Diagnoses: 309.24 - Adjustment Disorder with Anxiety  ; V71.09 - No Diagnosis  Psychosocial / Contextual Factors: daughter with bone disease  WHODAS: 13    Referral / Collaboration:  Referral to another professional/service is not indicated at this time..    Anticipated number of session or this episode of care: maintenance    Measurable Treatment  Goal(s) related to diagnosis / functional impairment(s)   I will know I've met my goal when I learn tools to cope with and lower my anxiety.     Goal 1: Client will experience a significant reduction in ZAIRA-7 scores.     Objective #A   Client will learn and integrate DBT/CBT strategies to more effectively manage emotions and relationships.   Status: Continued: February 18, 2025  Intervention(s)   Therapist will teach emotional regulation, distress tolerance, interpersonal effectiveness, and mindfulness skills. Meditation resources will be offered. Therapist will teach TIPP skills: Temperature, Intense exercise, Paced breathing, and Paired Muscle Relaxation.    Objective #B   Client will identify cognitive distortions and negative self-talk contributing to depression and anxiety.   Status: Continued: February 18, 2025  Intervention(s)   Therapist will teach cognitive distortion identification and coping strategies.    Objective #C   Client will engage in positive self-care.  Status: Continued: February 18, 2025  Intervention(s)   Therapist will teach self-care goals:  Maintain balance in schedule (time for self/others, relaxation/activities, leisure/tasks, home/out of the house), assert needs and set limits with others, challenge negative thoughts/use affirming and encouraging self-talk, engage with support people on regular basis, practice behavior activation behaviors (sleep hygiene, balanced eating, physical activity, medical needs, personal hygiene), acknowledge and accept your feelings.    Objective #D  Client will learn and use Love and Logic parenting skills with their children.  Status: Continued: February 18, 2025  Intervention(s)  Therapist will teach Love and Logic parenting skills.      Patient has reviewed and agreed to the above plan.    Rich Martinez MA, LMFT  February 18, 2025

## 2025-04-02 ENCOUNTER — E-VISIT (OUTPATIENT)
Dept: URGENT CARE | Facility: CLINIC | Age: 55
End: 2025-04-02
Payer: COMMERCIAL

## 2025-04-02 DIAGNOSIS — J01.90 ACUTE SINUSITIS WITH SYMPTOMS > 10 DAYS: Primary | ICD-10-CM

## 2025-04-02 NOTE — PATIENT INSTRUCTIONS
Dear Laya Zuleta    After reviewing your responses, I've been able to diagnose you with Acute sinusitis with symptoms > 10 days.      Based on your responses and diagnosis, I have prescribed   Orders Placed This Encounter   Medications     amoxicillin-clavulanate (AUGMENTIN) 875-125 MG tablet     Sig: Take 1 tablet by mouth 2 times daily for 7 days.     Dispense:  14 tablet     Refill:  0      to treat your symptoms. I have sent this to your pharmacy.?     It is also important to stay well hydrated, get lots of rest and take over-the-counter decongestants,?tylenol?or ibuprofen if you?are able to?take those medications per your primary care provider to help relieve discomfort.?     It is important that you take?all of?your prescribed medication even if your symptoms are improving after a few doses.? Taking?all of?your medicine helps prevent the symptoms from returning.?     If your symptoms worsen, you develop severe headache, vomiting, high fever (>102), or are not improving in 7 days, please contact your primary care provider for an appointment or visit any of our convenient Walk-in Care or Urgent Care Centers to be seen which can be found on our website?here.?     Thanks again for choosing?us?as your health care partner,?   ?  FLORENCE Osborne CNP?

## 2025-04-04 ENCOUNTER — MYC REFILL (OUTPATIENT)
Dept: PEDIATRICS | Facility: CLINIC | Age: 55
End: 2025-04-04

## 2025-04-04 DIAGNOSIS — E66.3 OVERWEIGHT (BMI 25.0-29.9): ICD-10-CM

## 2025-04-07 RX ORDER — SEMAGLUTIDE 1.7 MG/.75ML
1.7 INJECTION, SOLUTION SUBCUTANEOUS WEEKLY
Qty: 4 ML | Refills: 0 | Status: SHIPPED | OUTPATIENT
Start: 2025-04-07

## 2025-04-08 ENCOUNTER — OFFICE VISIT (OUTPATIENT)
Dept: PSYCHOLOGY | Facility: CLINIC | Age: 55
End: 2025-04-08
Payer: COMMERCIAL

## 2025-04-08 DIAGNOSIS — F43.22 ADJUSTMENT DISORDER WITH ANXIETY: Primary | ICD-10-CM

## 2025-04-08 PROCEDURE — 90834 PSYTX W PT 45 MINUTES: CPT | Performed by: MARRIAGE & FAMILY THERAPIST

## 2025-04-09 NOTE — PROGRESS NOTES
[facility]                                     Progress Note    Patient Name: Laya Zuleta  Date: 4/08/25           Service Type: Individual      Session Start Time: 12:03  Session End Time: 12:49     Session Length: 38 - 52 minutes    Session #: 49    Attendees: Client attended alone    Service Modality:  In Person     Treatment Plan Last Reviewed: February 18, 2025        DATA  Interactive Complexity: No  Crisis: No       Progress Since Last Session (Related to Symptoms / Goals / Homework):   Symptoms: No change .    Homework: Achieved / completed to satisfaction with strong self-care performance.      Episode of Care Goals: Satisfactory progress - ACTION (Actively working towards change); Intervened by reinforcing change plan / affirming steps taken     Current / Ongoing Stressors and Concerns:   Low self-esteem  Family boundaries issues   Daughter's medical and mental health diagnoses   Relationship conflict   Family of origin conflict     Treatment Objective(s) Addressed in This Session:   Client will identify cognitive distortions and negative self-talk contributing to depression and anxiety.       Intervention:   Taught/reviewed cognitive distortion and negative self-talk identification, reality checking and coping strategies. Client reports she is stressed by the developmental delays of her daughter as she approaches high school graduation in a few weeks. She says her daughter hs plans for community college and living at home, and client is catastrophizing that she will live at home until client passes away. Processed emotions in session, validated, supportive counseling. Guidance offered to better utilize client's coping skills.    Assessments completed prior to visit:  The following assessments were completed by patient for this visit:   PHQ9:       6/8/2023     1:49 PM 10/6/2023     9:50 AM 1/19/2024    12:04 PM 4/25/2024     3:35 PM 8/1/2024    12:33 PM 11/7/2024    11:08  AM 2/21/2025     9:19 AM   PHQ-9 SCORE   PHQ-9 Total Score 0 0 0 0 0 0 0     GAD7:       6/8/2023     1:49 PM 10/6/2023     9:50 AM 1/19/2024    12:04 PM 4/25/2024     3:35 PM 8/1/2024    12:33 PM 11/7/2024    11:08 AM 2/21/2025     9:19 AM   ZAIRA-7 SCORE   Total Score 2 3 3 2 3 2 3     PROMIS 10-Global Health (only subscores and total score):       7/11/2023    11:29 AM 11/7/2024    11:08 AM 12/26/2024     9:08 AM 4/8/2025    11:57 AM   PROMIS-10 Scores Only   Global Mental Health Score 13    13 13 13  13    Global Physical Health Score 15    15 15 18  14    PROMIS TOTAL - SUBSCORES 28    28 28 31  27        Patient-reported     [place] Suicide Severity Rating Scale (Short Version)      1/15/2021    10:27 AM 7/2/2021     4:00 PM 6/8/2023     1:50 PM 10/6/2023     9:51 AM 1/19/2024    12:05 PM 8/1/2024    12:34 PM 11/7/2024    11:10 AM   [place] Suicide Severity Rating (Short Version)   Over the past 2 weeks have you felt down, depressed, or hopeless? yes no        Over the past 2 weeks have you had thoughts of killing yourself? no no        Have you ever attempted to kill yourself? no no        1. Wish to be Dead (Since Last Contact)   N N N N N   2. Non-Specific Active Suicidal Thoughts (Since Last Contact)   N N N N N   Actual Attempt (Since Last Contact)   N N N N N   Has subject engaged in non-suicidal self-injurious behavior? (Since Last Contact)   N N N N N   Interrupted Attempts (Since Last Contact)   N N N N N   Aborted or Self-Interrupted Attempt (Since Last Contact)   N N N N N   Preparatory Acts or Behavior (Since Last Contact)   N N N N N   Suicide (Since Last Contact)   N N N N N   Calculated C-SSRS Risk Score (Since Last Contact)   No Risk Indicated No Risk Indicated No Risk Indicated No Risk Indicated No Risk Indicated         ASSESSMENT: Current Emotional / Mental Status (status of significant symptoms):   Risk status (Self / Other harm or suicidal ideation)   Patient denies current fears or  concerns for personal safety.   Patient denies current or recent suicidal ideation or behaviors.   Patientdenies current or recent homicidal ideation or behaviors.   Patient denies current or recent self injurious behavior or ideation.   Patient denies other safety concerns.   Patient reports there has been no change in risk factors since their last session.     Patientreports there has been no change in protective factors since their last session.     Recommended that patient call 911 or go to the local ED should there be a change in any of these risk factors     Appearance:   Appropriate    Eye Contact:   Good    Psychomotor Behavior: Normal    Attitude:   Interested   Orientation:   All   Speech    Rate / Production: Normal     Volume:  Normal    Mood:    Anxious    Affect:    Appropriate    Thought Content:  Clear    Thought Form:  Coherent  Logical    Insight:    Good      Medication Review:   No changes to current psychiatric medication(s)     Medication Compliance:   Yes     Changes in Health Issues:   None reported     Chemical Use Review:   Substance Use: Chemical use reviewed, no active concerns identified      Tobacco Use: No current tobacco use.      Diagnosis:  Adjustment disorder with anxiety    Collateral Reports Completed:   Not Applicable    PLAN: (Patient Tasks / Therapist Tasks / Other)  Client will  use cognitive distortion and negative self-talk identification, reality checking and coping strategies.        Chung Martinez MA, LMFT                                                         ______________________________________________________________________                                                 Treatment Plan    Client's Name: Laya Zuleta  YOB: 1970    Date: February 18, 2025    DSM-V Diagnoses: 309.24 - Adjustment Disorder with Anxiety  ; V71.09 - No Diagnosis  Psychosocial / Contextual Factors: daughter with bone disease  WHODAS: 13    Referral /  Collaboration:  Referral to another professional/service is not indicated at this time..    Anticipated number of session or this episode of care: maintenance    Measurable Treatment Goal(s) related to diagnosis / functional impairment(s)   I will know I've met my goal when I learn tools to cope with and lower my anxiety.     Goal 1: Client will experience a significant reduction in ZAIRA-7 scores.     Objective #A   Client will learn and integrate DBT/CBT strategies to more effectively manage emotions and relationships.   Status: Continued: February 18, 2025  Intervention(s)   Therapist will teach emotional regulation, distress tolerance, interpersonal effectiveness, and mindfulness skills. Meditation resources will be offered. Therapist will teach TIPP skills: Temperature, Intense exercise, Paced breathing, and Paired Muscle Relaxation.    Objective #B   Client will identify cognitive distortions and negative self-talk contributing to depression and anxiety.   Status: Continued: February 18, 2025  Intervention(s)   Therapist will teach cognitive distortion identification and coping strategies.    Objective #C   Client will engage in positive self-care.  Status: Continued: February 18, 2025  Intervention(s)   Therapist will teach self-care goals:  Maintain balance in schedule (time for self/others, relaxation/activities, leisure/tasks, home/out of the house), assert needs and set limits with others, challenge negative thoughts/use affirming and encouraging self-talk, engage with support people on regular basis, practice behavior activation behaviors (sleep hygiene, balanced eating, physical activity, medical needs, personal hygiene), acknowledge and accept your feelings.    Objective #D  Client will learn and use Love and Logic parenting skills with their children.  Status: Continued: February 18, 2025  Intervention(s)  Therapist will teach Love and Logic parenting skills.      Patient has reviewed and agreed to the  above plan.    Rich Martinez MA, LMFT  February 18, 2025

## 2025-04-11 ENCOUNTER — OFFICE VISIT (OUTPATIENT)
Dept: PEDIATRICS | Facility: CLINIC | Age: 55
End: 2025-04-11
Payer: COMMERCIAL

## 2025-04-11 VITALS
HEIGHT: 67 IN | HEART RATE: 81 BPM | OXYGEN SATURATION: 98 % | RESPIRATION RATE: 18 BRPM | BODY MASS INDEX: 24.63 KG/M2 | DIASTOLIC BLOOD PRESSURE: 82 MMHG | SYSTOLIC BLOOD PRESSURE: 121 MMHG | WEIGHT: 156.9 LBS | TEMPERATURE: 98 F

## 2025-04-11 DIAGNOSIS — N89.8 VAGINAL DISCHARGE: Primary | ICD-10-CM

## 2025-04-11 DIAGNOSIS — E66.3 OVERWEIGHT (BMI 25.0-29.9): ICD-10-CM

## 2025-04-11 DIAGNOSIS — E78.00 ELEVATED LDL CHOLESTEROL LEVEL: ICD-10-CM

## 2025-04-11 DIAGNOSIS — N76.0 VAGINITIS AND VULVOVAGINITIS: ICD-10-CM

## 2025-04-11 PROCEDURE — 3079F DIAST BP 80-89 MM HG: CPT | Performed by: NURSE PRACTITIONER

## 2025-04-11 PROCEDURE — 3074F SYST BP LT 130 MM HG: CPT | Performed by: NURSE PRACTITIONER

## 2025-04-11 PROCEDURE — 87210 SMEAR WET MOUNT SALINE/INK: CPT | Performed by: NURSE PRACTITIONER

## 2025-04-11 PROCEDURE — 1126F AMNT PAIN NOTED NONE PRSNT: CPT | Performed by: NURSE PRACTITIONER

## 2025-04-11 PROCEDURE — 98014 SYNCH AUDIO-ONLY EST MOD 30: CPT | Performed by: NURSE PRACTITIONER

## 2025-04-11 RX ORDER — FLUCONAZOLE 150 MG/1
150 TABLET ORAL
Qty: 3 TABLET | Refills: 0 | Status: SHIPPED | OUTPATIENT
Start: 2025-04-11 | End: 2025-04-17

## 2025-04-11 RX ORDER — SEMAGLUTIDE 1.7 MG/.75ML
1.7 INJECTION, SOLUTION SUBCUTANEOUS WEEKLY
Qty: 12 ML | Refills: 0 | Status: SHIPPED | OUTPATIENT
Start: 2025-04-11

## 2025-04-11 RX ORDER — BUPROPION HYDROCHLORIDE 300 MG/1
300 TABLET ORAL EVERY MORNING
Qty: 90 TABLET | Refills: 0 | Status: SHIPPED | OUTPATIENT
Start: 2025-04-11

## 2025-04-11 ASSESSMENT — PAIN SCALES - GENERAL: PAINLEVEL_OUTOF10: NO PAIN (0)

## 2025-04-11 NOTE — PROGRESS NOTES
Assessment & Plan   Vaginal discharge  Vaginitis and vulvovaginitis  Symptom onset after abx use. Had x1 dose of diflucan and also used topical monistat. Reasonable to treat with addtl diflucan even with neg swab. Needs follow-up in clinic if not improving as expected.  - Wet prep - lab collect; Future  - Wet prep - lab collect  - fluconazole (DIFLUCAN) 150 MG tablet; Take 1 tablet (150 mg) by mouth every 3 days for 3 doses. Ok to stop after 1 dose if symptoms resolved    Overweight (BMI 25.0-29.9)  Tolerating meds, refills given. Has followup scheduled with PCP  - buPROPion (WELLBUTRIN XL) 300 MG 24 hr tablet; Take 1 tablet (300 mg) by mouth every morning.  - Lipid panel reflex to direct LDL Fasting; Future  - Basic metabolic panel  (Ca, Cl, CO2, Creat, Gluc, K, Na, BUN); Future  - WEGOVY 1.7 MG/0.75ML pen; Inject 1.7 mg subcutaneously once a week.    Elevated LDL cholesterol level  Recheck fasting labs before her upcoming visit with PCP. She is hesitant to take statin, would be a good candidate for CAC if LDL remains elevated.  - Lipid panel reflex to direct LDL Fasting; Future  - Basic metabolic panel  (Ca, Cl, CO2, Creat, Gluc, K, Na, BUN); Future    This pt's vitals were collected in person my MA staff. Pt left before I saw her in clinic so the remainder of the history/physical/plan was done virtually via telephone from myself.      The longitudinal plan of care for the diagnosis(es)/condition(s) as documented were addressed during this visit. Due to the added complexity in care, I will continue to support Laya in the subsequent management and with ongoing continuity of care in partnership with PCP      There are no Patient Instructions on file for this visit.    Subjective   Laya is a 54 year old, presenting for the following health issues:  Vaginal Problem    Finished antibiotic a week ago. Pt started getting symptoms a few days after starting the antibiotics. Took one pill for the yeast  "infection    4/11/2025     9:49 AM   Additional Questions   Roomed by KAYLA   Accompanied by CORAZON         4/11/2025     9:49 AM   Patient Reported Additional Medications   Patient reports taking the following new medications na     History of Present Illness       Reason for visit:  I have a yeast infection from taking antibiotics that will not go away.  Symptom onset:  1-2 weeks ago  Symptoms include:  Burning, itching, discharge  Symptom intensity:  Moderate  Symptom progression:  Staying the same  Had these symptoms before:  Yes  Has tried/received treatment for these symptoms:  Yes  Previous treatment was successful:  No   She is taking medications regularly.      Treated with diflucan via Evisit 4/4/25  Also did 1 day monistat  Denies any chance of STI  Feels \"swollen\" to labia but no lesions or rash            Objective    /82 (BP Location: Right arm, Patient Position: Sitting, Cuff Size: Adult Regular)   Pulse 81   Temp 98  F (36.7  C) (Tympanic)   Resp 18   Ht 1.702 m (5' 7\")   Wt 71.2 kg (156 lb 14.4 oz)   LMP 09/15/2015 (LMP Unknown)   SpO2 98%   BMI 24.57 kg/m    Body mass index is 24.57 kg/m .  Physical Exam   GENERAL: alert and no distress          PHONE VISIT DURATION 10 MINUTES  Signed Electronically by: Lesly Orozco NP    "

## 2025-04-17 ENCOUNTER — OFFICE VISIT (OUTPATIENT)
Dept: PEDIATRICS | Facility: CLINIC | Age: 55
End: 2025-04-17
Payer: COMMERCIAL

## 2025-04-17 VITALS
WEIGHT: 154.6 LBS | HEIGHT: 67 IN | SYSTOLIC BLOOD PRESSURE: 110 MMHG | OXYGEN SATURATION: 98 % | HEART RATE: 63 BPM | RESPIRATION RATE: 20 BRPM | DIASTOLIC BLOOD PRESSURE: 72 MMHG | BODY MASS INDEX: 24.27 KG/M2 | TEMPERATURE: 97.1 F

## 2025-04-17 DIAGNOSIS — B37.31 YEAST INFECTION OF THE VAGINA: ICD-10-CM

## 2025-04-17 DIAGNOSIS — N89.8 VAGINAL IRRITATION: Primary | ICD-10-CM

## 2025-04-17 RX ORDER — ESTRADIOL 0.1 MG/G
CREAM VAGINAL
Qty: 42.5 G | Refills: 1 | Status: SHIPPED | OUTPATIENT
Start: 2025-04-17

## 2025-04-17 ASSESSMENT — PAIN SCALES - GENERAL: PAINLEVEL_OUTOF10: NO PAIN (0)

## 2025-04-17 NOTE — PROGRESS NOTES
Assessment & Plan   Vaginal irritation  Persistent symptoms even with treatment for possible yeast infection. Begin topical estrogen, no contraindications. Follow-up if not improving in the next 2 weeks via MyChart and would consider referral to OBGYN at that time.  - estradiol (ESTRACE) 0.1 MG/GM vaginal cream; Apply 0.5 g of cream intravaginally administered once daily for 2 weeks, then reduce to twice weekly    Yeast infection of the vagina  Pt initially possibly had yeast infection after she took abx. Now has had x2 neg wet preps and not improving after 4 doses of diflucan so do not think cause of her persistent symptoms, see above.  - Wet prep - Clinic Collect    The longitudinal plan of care for the diagnosis(es)/condition(s) as documented were addressed during this visit. Due to the added complexity in care, I will continue to support Laya in the subsequent management and with ongoing continuity of care in partnership with PCP      Follow-up   No follow-ups on file.        Subjective   Laya is a 54 year old, presenting for the following health issues:  Vaginal Problem        4/17/2025    10:41 AM   Additional Questions   Roomed by Mey WADSWORTH   Accompanied by N/A         4/17/2025    10:41 AM   Patient Reported Additional Medications   Patient reports taking the following new medications No     History of Present Illness       Reason for visit:  I have a yeast infection from taking antibiotics that will not go away.  Symptom onset:  1-2 weeks ago  Symptoms include:  Burning, itching, discharge  Symptom intensity:  Moderate  Symptom progression:  Staying the same  Had these symptoms before:  Yes  Has tried/received treatment for these symptoms:  Yes  Previous treatment was successful:  No   She is taking medications regularly.      Pt seen in clinic on 4/11 for ongoing vag discharge/irritation-had already taken x1 dose of diflucan and did topical monistat. Had negative wet prep at that time but given  "additional 3 doses of diflucan    Feels symptoms are not improving  Onset after taking abx  Has taken x4 doses of diflucan  Denies any chance of STI  No vaginal bleeding  Denies any pain with intercourse          Objective    /72 (BP Location: Right arm, Patient Position: Sitting, Cuff Size: Adult Regular)   Pulse 63   Temp 97.1  F (36.2  C) (Temporal)   Resp 20   Ht 1.702 m (5' 7\")   Wt 70.1 kg (154 lb 9.6 oz)   LMP 09/15/2015 (LMP Unknown)   SpO2 98%   BMI 24.21 kg/m    Body mass index is 24.21 kg/m .  Physical Exam   GENERAL: alert and no distress   (female): normal female external genitalia, normal urethral meatus, normal vaginal mucosa            Signed Electronically by: Lesly Orozco NP    "

## 2025-05-13 ENCOUNTER — OFFICE VISIT (OUTPATIENT)
Dept: PSYCHOLOGY | Facility: CLINIC | Age: 55
End: 2025-05-13
Payer: COMMERCIAL

## 2025-05-13 DIAGNOSIS — F43.22 ADJUSTMENT DISORDER WITH ANXIETY: Primary | ICD-10-CM

## 2025-05-13 PROCEDURE — 90834 PSYTX W PT 45 MINUTES: CPT | Performed by: MARRIAGE & FAMILY THERAPIST

## 2025-05-15 NOTE — PROGRESS NOTES
[facility]                                     Progress Note    Patient Name: Laya Zuleta  Date: 5/13/25           Service Type: Individual      Session Start Time: 10:00  Session End Time: 1:46     Session Length: 38 - 52 minutes    Session #: 50    Attendees: Client attended alone    Service Modality:  In Person     Treatment Plan Last Reviewed: February 18, 2025        DATA  Interactive Complexity: No  Crisis: No       Progress Since Last Session (Related to Symptoms / Goals / Homework):   Symptoms: No change .    Homework: Achieved / completed to satisfaction with strong self-care performance.      Episode of Care Goals: Satisfactory progress - ACTION (Actively working towards change); Intervened by reinforcing change plan / affirming steps taken     Current / Ongoing Stressors and Concerns:   Low self-esteem  Family boundaries issues   Daughter's medical and mental health diagnoses and development   Relationship conflict   Family of origin conflict     Treatment Objective(s) Addressed in This Session:   Client will identify cognitive distortions and negative self-talk contributing to depression and anxiety.       Intervention:   Taught/reviewed cognitive distortion and negative self-talk identification, reality checking and coping strategies. Client reports she continues to be anxious by the developmental challenges of her daughter as she approaches high school graduation. She says her daughter attended [employer] by herself and called home to be picked while lying about being bullied at the event. Taught/reviewed Love and Logic parenting skills. Processed emotions in session, validated, supportive counseling. Guidance offered to better utilize client's coping skills.    Assessments completed prior to visit:  The following assessments were completed by patient for this visit:   PHQ9:       6/8/2023     1:49 PM 10/6/2023     9:50 AM 1/19/2024    12:04 PM 4/25/2024     3:35 PM 8/1/2024     12:33 PM 11/7/2024    11:08 AM 2/21/2025     9:19 AM   PHQ-9 SCORE   PHQ-9 Total Score 0 0 0 0 0 0 0     GAD7:       6/8/2023     1:49 PM 10/6/2023     9:50 AM 1/19/2024    12:04 PM 4/25/2024     3:35 PM 8/1/2024    12:33 PM 11/7/2024    11:08 AM 2/21/2025     9:19 AM   ZAIRA-7 SCORE   Total Score 2 3 3 2 3 2 3     PROMIS 10-Global Health (only subscores and total score):       7/11/2023    11:29 AM 11/7/2024    11:08 AM 12/26/2024     9:08 AM 4/8/2025    11:57 AM   PROMIS-10 Scores Only   Global Mental Health Score 13    13 13 13  13    Global Physical Health Score 15    15 15 18  14    PROMIS TOTAL - SUBSCORES 28    28 28 31  27        Patient-reported     [place] Suicide Severity Rating Scale (Short Version)      1/15/2021    10:27 AM 7/2/2021     4:00 PM 6/8/2023     1:50 PM 10/6/2023     9:51 AM 1/19/2024    12:05 PM 8/1/2024    12:34 PM 11/7/2024    11:10 AM   [place] Suicide Severity Rating (Short Version)   Over the past 2 weeks have you felt down, depressed, or hopeless? yes no        Over the past 2 weeks have you had thoughts of killing yourself? no no        Have you ever attempted to kill yourself? no no        1. Wish to be Dead (Since Last Contact)   N N N N N   2. Non-Specific Active Suicidal Thoughts (Since Last Contact)   N N N N N   Actual Attempt (Since Last Contact)   N N N N N   Has subject engaged in non-suicidal self-injurious behavior? (Since Last Contact)   N N N N N   Interrupted Attempts (Since Last Contact)   N N N N N   Aborted or Self-Interrupted Attempt (Since Last Contact)   N N N N N   Preparatory Acts or Behavior (Since Last Contact)   N N N N N   Suicide (Since Last Contact)   N N N N N   Calculated C-SSRS Risk Score (Since Last Contact)   No Risk Indicated No Risk Indicated No Risk Indicated No Risk Indicated No Risk Indicated         ASSESSMENT: Current Emotional / Mental Status (status of significant symptoms):   Risk status (Self / Other harm or suicidal ideation)   Patient  denies current fears or concerns for personal safety.   Patient denies current or recent suicidal ideation or behaviors.   Patientdenies current or recent homicidal ideation or behaviors.   Patient denies current or recent self injurious behavior or ideation.   Patient denies other safety concerns.   Patient reports there has been no change in risk factors since their last session.     Patientreports there has been no change in protective factors since their last session.     Recommended that patient call 911 or go to the local ED should there be a change in any of these risk factors     Appearance:   Appropriate    Eye Contact:   Good    Psychomotor Behavior: Normal    Attitude:   Cooperative    Orientation:   All   Speech    Rate / Production: Normal     Volume:  Normal    Mood:    Anxious    Affect:    Appropriate    Thought Content:  Clear    Thought Form:  Coherent  Logical    Insight:    Good      Medication Review:   No changes to current psychiatric medication(s)     Medication Compliance:   Yes     Changes in Health Issues:   None reported     Chemical Use Review:   Substance Use: Chemical use reviewed, no active concerns identified      Tobacco Use: No current tobacco use.      Diagnosis:  Adjustment disorder with anxiety    Collateral Reports Completed:   Not Applicable    PLAN: (Patient Tasks / Therapist Tasks / Other)  Client will  use cognitive distortion and negative self-talk identification, reality checking and coping strategies. She will employ Love and Logic parenting skills with her daughter.        Chung Martinez MA, LMFT                                                         ______________________________________________________________________                                                 Treatment Plan    Client's Name: Laya Zuleta  YOB: 1970    Date: February 18, 2025    DSM-V Diagnoses: 309.24 - Adjustment Disorder with Anxiety  ; V71.09 - No  Diagnosis  Psychosocial / Contextual Factors: daughter with bone disease  WHODAS: 13    Referral / Collaboration:  Referral to another professional/service is not indicated at this time..    Anticipated number of session or this episode of care: maintenance    Measurable Treatment Goal(s) related to diagnosis / functional impairment(s)   I will know I've met my goal when I learn tools to cope with and lower my anxiety.     Goal 1: Client will experience a significant reduction in ZAIRA-7 scores.     Objective #A   Client will learn and integrate DBT/CBT strategies to more effectively manage emotions and relationships.   Status: Continued: February 18, 2025  Intervention(s)   Therapist will teach emotional regulation, distress tolerance, interpersonal effectiveness, and mindfulness skills. Meditation resources will be offered. Therapist will teach TIPP skills: Temperature, Intense exercise, Paced breathing, and Paired Muscle Relaxation.    Objective #B   Client will identify cognitive distortions and negative self-talk contributing to depression and anxiety.   Status: Continued: February 18, 2025  Intervention(s)   Therapist will teach cognitive distortion identification and coping strategies.    Objective #C   Client will engage in positive self-care.  Status: Continued: February 18, 2025  Intervention(s)   Therapist will teach self-care goals:  Maintain balance in schedule (time for self/others, relaxation/activities, leisure/tasks, home/out of the house), assert needs and set limits with others, challenge negative thoughts/use affirming and encouraging self-talk, engage with support people on regular basis, practice behavior activation behaviors (sleep hygiene, balanced eating, physical activity, medical needs, personal hygiene), acknowledge and accept your feelings.    Objective #D  Client will learn and use Love and Logic parenting skills with their children.  Status: Continued: February 18,  2025  Intervention(s)  Therapist will teach Love and Logic parenting skills.      Patient has reviewed and agreed to the above plan.    Rich Martinez MA, LMFT  February 18, 2025

## 2025-06-03 ENCOUNTER — OFFICE VISIT (OUTPATIENT)
Dept: PSYCHOLOGY | Facility: CLINIC | Age: 55
End: 2025-06-03
Payer: COMMERCIAL

## 2025-06-03 DIAGNOSIS — F43.22 ADJUSTMENT DISORDER WITH ANXIETY: Primary | ICD-10-CM

## 2025-06-03 PROCEDURE — 90834 PSYTX W PT 45 MINUTES: CPT | Performed by: MARRIAGE & FAMILY THERAPIST

## 2025-06-05 ASSESSMENT — COLUMBIA-SUICIDE SEVERITY RATING SCALE - C-SSRS
1. SINCE LAST CONTACT, HAVE YOU WISHED YOU WERE DEAD OR WISHED YOU COULD GO TO SLEEP AND NOT WAKE UP?: NO
SUICIDE, SINCE LAST CONTACT: NO
TOTAL  NUMBER OF INTERRUPTED ATTEMPTS SINCE LAST CONTACT: NO
ATTEMPT SINCE LAST CONTACT: NO
TOTAL  NUMBER OF ABORTED OR SELF INTERRUPTED ATTEMPTS SINCE LAST CONTACT: NO
2. HAVE YOU ACTUALLY HAD ANY THOUGHTS OF KILLING YOURSELF?: NO
6. HAVE YOU EVER DONE ANYTHING, STARTED TO DO ANYTHING, OR PREPARED TO DO ANYTHING TO END YOUR LIFE?: NO

## 2025-06-05 ASSESSMENT — ANXIETY QUESTIONNAIRES
7. FEELING AFRAID AS IF SOMETHING AWFUL MIGHT HAPPEN: SEVERAL DAYS
3. WORRYING TOO MUCH ABOUT DIFFERENT THINGS: SEVERAL DAYS
5. BEING SO RESTLESS THAT IT IS HARD TO SIT STILL: NOT AT ALL
2. NOT BEING ABLE TO STOP OR CONTROL WORRYING: NOT AT ALL
GAD7 TOTAL SCORE: 4
IF YOU CHECKED OFF ANY PROBLEMS ON THIS QUESTIONNAIRE, HOW DIFFICULT HAVE THESE PROBLEMS MADE IT FOR YOU TO DO YOUR WORK, TAKE CARE OF THINGS AT HOME, OR GET ALONG WITH OTHER PEOPLE: SOMEWHAT DIFFICULT
GAD7 TOTAL SCORE: 4
6. BECOMING EASILY ANNOYED OR IRRITABLE: SEVERAL DAYS
1. FEELING NERVOUS, ANXIOUS, OR ON EDGE: SEVERAL DAYS

## 2025-06-05 ASSESSMENT — PATIENT HEALTH QUESTIONNAIRE - PHQ9
5. POOR APPETITE OR OVEREATING: NOT AT ALL
SUM OF ALL RESPONSES TO PHQ QUESTIONS 1-9: 0

## 2025-06-05 NOTE — PROGRESS NOTES
[facility]                                     Progress Note    Patient Name: Laya Zuleta  Date: 6/03/25           Service Type: Individual      Session Start Time: 12:02  Session End Time: 12:50     Session Length: 38 - 52 minutes    Session #: 51    Attendees: Client attended alone    Service Modality:  In Person     Treatment Plan Last Reviewed: Martha 3, 2025         DATA  Interactive Complexity: No  Crisis: No       Progress Since Last Session (Related to Symptoms / Goals / Homework):   Symptoms: No change .    Homework: Achieved / completed to satisfaction with strong self-care performance.      Episode of Care Goals: Satisfactory progress - ACTION (Actively working towards change); Intervened by reinforcing change plan / affirming steps taken     Current / Ongoing Stressors and Concerns:   Low self-esteem  Family boundaries issues   Daughter's medical and mental health diagnoses and development   Relationship conflict   Family of origin conflict     Treatment Objective(s) Addressed in This Session:   Client will increase understanding of ambiguous loss/grief and strategies to cope with a loss.       Intervention:   Taught/reviewed grief process and ambiguous loss coping strategies, including strong self-care behaviors.. Client reports she continues to be anxious by the developmental challenges of her daughter as she approaches high school graduation. She says she is very sad that her daughter is not developmentally/socially normal and her autism prevents some functioning. Processed emotions in session, validated, supportive counseling. Guidance offered to better utilize client's coping skills.    Assessments completed prior to visit:  The following assessments were completed by patient for this visit:   PHQ9:       10/6/2023     9:50 AM 1/19/2024    12:04 PM 4/25/2024     3:35 PM 8/1/2024    12:33 PM 11/7/2024    11:08 AM 2/21/2025     9:19 AM 6/5/2025     1:38 PM   PHQ-9 SCORE    PHQ-9 Total Score 0 0 0 0 0 0 0     GAD7:       10/6/2023     9:50 AM 1/19/2024    12:04 PM 4/25/2024     3:35 PM 8/1/2024    12:33 PM 11/7/2024    11:08 AM 2/21/2025     9:19 AM 6/5/2025     1:38 PM   ZAIRA-7 SCORE   Total Score 3 3 2 3 2 3 4     PROMIS 10-Global Health (only subscores and total score):       7/11/2023    11:29 AM 11/7/2024    11:08 AM 12/26/2024     9:08 AM 4/8/2025    11:57 AM   PROMIS-10 Scores Only   Global Mental Health Score 13    13 13 13  13    Global Physical Health Score 15    15 15 18  14    PROMIS TOTAL - SUBSCORES 28    28 28 31  27        Patient-reported     [place] Suicide Severity Rating Scale (Short Version)      7/2/2021     4:00 PM 6/8/2023     1:50 PM 10/6/2023     9:51 AM 1/19/2024    12:05 PM 8/1/2024    12:34 PM 11/7/2024    11:10 AM 6/5/2025     1:38 PM   [place] Suicide Severity Rating (Short Version)   Over the past 2 weeks have you felt down, depressed, or hopeless? no         Over the past 2 weeks have you had thoughts of killing yourself? no         Have you ever attempted to kill yourself? no         1. Wish to be Dead (Since Last Contact)  N N N N N N   2. Non-Specific Active Suicidal Thoughts (Since Last Contact)  N N N N N N   Actual Attempt (Since Last Contact)  N N N N N N   Has subject engaged in non-suicidal self-injurious behavior? (Since Last Contact)  N N N N N N   Interrupted Attempts (Since Last Contact)  N N N N N N   Aborted or Self-Interrupted Attempt (Since Last Contact)  N N N N N N   Preparatory Acts or Behavior (Since Last Contact)  N N N N N N   Suicide (Since Last Contact)  N N N N N N   Calculated C-SSRS Risk Score (Since Last Contact)  No Risk Indicated No Risk Indicated No Risk Indicated No Risk Indicated No Risk Indicated No Risk Indicated         ASSESSMENT: Current Emotional / Mental Status (status of significant symptoms):   Risk status (Self / Other harm or suicidal ideation)   Patient denies current fears or concerns for personal  safety.   Patient denies current or recent suicidal ideation or behaviors.   Patientdenies current or recent homicidal ideation or behaviors.   Patient denies current or recent self injurious behavior or ideation.   Patient denies other safety concerns.   Patient reports there has been no change in risk factors since their last session.     Patientreports there has been no change in protective factors since their last session.     Recommended that patient call 911 or go to the local ED should there be a change in any of these risk factors     Appearance:   Appropriate    Eye Contact:   Good    Psychomotor Behavior: Normal    Attitude:   Interested   Orientation:   All   Speech    Rate / Production: Normal     Volume:  Normal    Mood:    Grieving   Affect:    Appropriate    Thought Content:  Clear    Thought Form:  Coherent  Logical    Insight:    Good      Medication Review:   No changes to current psychiatric medication(s)     Medication Compliance:   Yes     Changes in Health Issues:   None reported     Chemical Use Review:   Substance Use: Chemical use reviewed, no active concerns identified      Tobacco Use: No current tobacco use.      Diagnosis:  Adjustment disorder with anxiety    Collateral Reports Completed:   Not Applicable    PLAN: (Patient Tasks / Therapist Tasks / Other)  Client will  use grief and ambiguous loss coping strategies, including strong self-care behaviors.      Chung Martinez MA, LMFT                                                         ______________________________________________________________________                                                 Treatment Plan    Client's Name: Laya Zuleta  YOB: 1970    Date: Martha 3, 2025    DSM-V Diagnoses: 309.24 - Adjustment Disorder with Anxiety  ; V71.09 - No Diagnosis  Psychosocial / Contextual Factors: daughter with bone disease  WHODAS: 13    Referral / Collaboration:  Referral to another professional/service is  not indicated at this time..    Anticipated number of session or this episode of care: maintenance    Measurable Treatment Goal(s) related to diagnosis / functional impairment(s)   I will know I've met my goal when I learn tools to cope with and lower my anxiety.     Goal 1: Client will experience a significant reduction in ZAIRA-7 scores.     Objective #A   Client will learn and integrate DBT/CBT strategies to more effectively manage emotions and relationships.   Status: Continued: Martha 3, 2025  Intervention(s)   Therapist will teach emotional regulation, distress tolerance, interpersonal effectiveness, and mindfulness skills. Meditation resources will be offered. Therapist will teach TIPP skills: Temperature, Intense exercise, Paced breathing, and Paired Muscle Relaxation.    Objective #B   Client will identify cognitive distortions and negative self-talk contributing to depression and anxiety.   Status: Continued: Martha 3, 2025  Intervention(s)   Therapist will teach cognitive distortion identification and coping strategies.    Objective #C   Client will engage in positive self-care.  Status: Continued: Martha 3, 2025  Intervention(s)   Therapist will teach self-care goals:  Maintain balance in schedule (time for self/others, relaxation/activities, leisure/tasks, home/out of the house), assert needs and set limits with others, challenge negative thoughts/use affirming and encouraging self-talk, engage with support people on regular basis, practice behavior activation behaviors (sleep hygiene, balanced eating, physical activity, medical needs, personal hygiene), acknowledge and accept your feelings.    Objective #D  Client will learn and use Love and Logic parenting skills with their children.  Status: Continued: Martha 3, 2025  Intervention(s)  Therapist will teach Love and Logic parenting skills.      Patient has reviewed and agreed to the above plan.    Rich Martinez MA, LMFT  Martha 3, 2025

## 2025-06-17 ENCOUNTER — OFFICE VISIT (OUTPATIENT)
Dept: PSYCHOLOGY | Facility: CLINIC | Age: 55
End: 2025-06-17
Payer: COMMERCIAL

## 2025-06-17 DIAGNOSIS — F43.22 ADJUSTMENT DISORDER WITH ANXIETY: Primary | ICD-10-CM

## 2025-06-17 PROCEDURE — 90834 PSYTX W PT 45 MINUTES: CPT | Performed by: MARRIAGE & FAMILY THERAPIST

## 2025-06-19 NOTE — PROGRESS NOTES
[facility]                                     Progress Note    Patient Name: Laya Zuleta  Date: 6/17/25           Service Type: Individual      Session Start Time: 12:00  Session End Time: 12:47     Session Length: 38 - 52 minutes    Session #: 52    Attendees: Client attended alone    Service Modality:  In Person     Treatment Plan Last Reviewed: Martha 3, 2025         DATA  Interactive Complexity: No  Crisis: No       Progress Since Last Session (Related to Symptoms / Goals / Homework):   Symptoms: No change .    Homework: Achieved / completed to satisfaction with strong self-care performance.      Episode of Care Goals: Satisfactory progress - ACTION (Actively working towards change); Intervened by reinforcing change plan / affirming steps taken     Current / Ongoing Stressors and Concerns:   Low self-esteem  Family boundaries issues   Daughter's medical and mental health diagnoses and development   Relationship conflict   Family of origin conflict     Treatment Objective(s) Addressed in This Session:   Client will learn and integrate DBT/CBT strategies to more effectively manage emotions and relationships.       Intervention:   Taught/reviewed mindfulness and present moment skills and strategies for daily use. Client reports she was able to communicate well and keep good boundaries during her trip to [place] with friends. Chain analysis of successful transactions. She says her daughter;s high school graduation party is coming up soon an client is frustrated by her daughter's late sleeping and not contributing to the household, Love and Logic principles reviewed. Processed emotions in session, validated, supportive counseling. Guidance offered to better utilize client's coping skills.    Assessments completed prior to visit:  The following assessments were completed by patient for this visit:   PHQ9:       10/6/2023     9:50 AM 1/19/2024    12:04 PM 4/25/2024     3:35 PM 8/1/2024     12:33 PM 11/7/2024    11:08 AM 2/21/2025     9:19 AM 6/5/2025     1:38 PM   PHQ-9 SCORE   PHQ-9 Total Score 0 0 0 0 0 0 0     GAD7:       10/6/2023     9:50 AM 1/19/2024    12:04 PM 4/25/2024     3:35 PM 8/1/2024    12:33 PM 11/7/2024    11:08 AM 2/21/2025     9:19 AM 6/5/2025     1:38 PM   ZAIRA-7 SCORE   Total Score 3 3 2 3 2 3 4     PROMIS 10-Global Health (only subscores and total score):       7/11/2023    11:29 AM 11/7/2024    11:08 AM 12/26/2024     9:08 AM 4/8/2025    11:57 AM   PROMIS-10 Scores Only   Global Mental Health Score 13    13 13 13  13    Global Physical Health Score 15    15 15 18  14    PROMIS TOTAL - SUBSCORES 28    28 28 31  27        Patient-reported     [place] Suicide Severity Rating Scale (Short Version)      7/2/2021     4:00 PM 6/8/2023     1:50 PM 10/6/2023     9:51 AM 1/19/2024    12:05 PM 8/1/2024    12:34 PM 11/7/2024    11:10 AM 6/5/2025     1:38 PM   [place] Suicide Severity Rating (Short Version)   Over the past 2 weeks have you felt down, depressed, or hopeless? no         Over the past 2 weeks have you had thoughts of killing yourself? no         Have you ever attempted to kill yourself? no         1. Wish to be Dead (Since Last Contact)  N N N N N N   2. Non-Specific Active Suicidal Thoughts (Since Last Contact)  N N N N N N   Actual Attempt (Since Last Contact)  N N N N N N   Has subject engaged in non-suicidal self-injurious behavior? (Since Last Contact)  N N N N N N   Interrupted Attempts (Since Last Contact)  N N N N N N   Aborted or Self-Interrupted Attempt (Since Last Contact)  N N N N N N   Preparatory Acts or Behavior (Since Last Contact)  N N N N N N   Suicide (Since Last Contact)  N N N N N N   Calculated C-SSRS Risk Score (Since Last Contact)  No Risk Indicated No Risk Indicated No Risk Indicated No Risk Indicated No Risk Indicated No Risk Indicated         ASSESSMENT: Current Emotional / Mental Status (status of significant symptoms):   Risk status (Self / Other  harm or suicidal ideation)   Patient denies current fears or concerns for personal safety.   Patient denies current or recent suicidal ideation or behaviors.   Patientdenies current or recent homicidal ideation or behaviors.   Patient denies current or recent self injurious behavior or ideation.   Patient denies other safety concerns.   Patient reports there has been no change in risk factors since their last session.     Patientreports there has been no change in protective factors since their last session.     Recommended that patient call 911 or go to the local ED should there be a change in any of these risk factors     Appearance:   Appropriate    Eye Contact:   Good    Psychomotor Behavior: Normal    Attitude:   Cooperative    Orientation:   All   Speech    Rate / Production: Normal     Volume:  Normal    Mood:    Angry  Anxious    Affect:    Appropriate    Thought Content:  Clear    Thought Form:  Coherent  Logical    Insight:    Good      Medication Review:   No changes to current psychiatric medication(s)     Medication Compliance:   Yes     Changes in Health Issues:   None reported     Chemical Use Review:   Substance Use: Chemical use reviewed, no active concerns identified      Tobacco Use: No current tobacco use.      Diagnosis:  Adjustment disorder with anxiety    Collateral Reports Completed:   Not Applicable    PLAN: (Patient Tasks / Therapist Tasks / Other)  Client will  use Love and Logic practices with her daughter.        Chung Martinez MA, LMFT                                                         ______________________________________________________________________                                                 Treatment Plan    Client's Name: Laya Zuleta  YOB: 1970    Date: Martha 3, 2025    DSM-V Diagnoses: 309.24 - Adjustment Disorder with Anxiety  ; V71.09 - No Diagnosis  Psychosocial / Contextual Factors: daughter with bone disease  WHODAS: 13    Referral /  Collaboration:  Referral to another professional/service is not indicated at this time..    Anticipated number of session or this episode of care: maintenance    Measurable Treatment Goal(s) related to diagnosis / functional impairment(s)   I will know I've met my goal when I learn tools to cope with and lower my anxiety.     Goal 1: Client will experience a significant reduction in ZAIRA-7 scores.     Objective #A   Client will learn and integrate DBT/CBT strategies to more effectively manage emotions and relationships.   Status: Continued: Martha 3, 2025  Intervention(s)   Therapist will teach emotional regulation, distress tolerance, interpersonal effectiveness, and mindfulness skills. Meditation resources will be offered. Therapist will teach TIPP skills: Temperature, Intense exercise, Paced breathing, and Paired Muscle Relaxation.    Objective #B   Client will identify cognitive distortions and negative self-talk contributing to depression and anxiety.   Status: Continued: Martha 3, 2025  Intervention(s)   Therapist will teach cognitive distortion identification and coping strategies.    Objective #C   Client will engage in positive self-care.  Status: Continued: Martha 3, 2025  Intervention(s)   Therapist will teach self-care goals:  Maintain balance in schedule (time for self/others, relaxation/activities, leisure/tasks, home/out of the house), assert needs and set limits with others, challenge negative thoughts/use affirming and encouraging self-talk, engage with support people on regular basis, practice behavior activation behaviors (sleep hygiene, balanced eating, physical activity, medical needs, personal hygiene), acknowledge and accept your feelings.    Objective #D  Client will learn and use Love and Logic parenting skills with their children.  Status: Continued: Martha 3, 2025  Intervention(s)  Therapist will teach Love and Logic parenting skills.      Patient has reviewed and agreed to the above plan.    Rich  JOHN Martinez, LMFT  Martha 3, 2025

## 2025-07-01 ENCOUNTER — OFFICE VISIT (OUTPATIENT)
Dept: PSYCHOLOGY | Facility: CLINIC | Age: 55
End: 2025-07-01
Payer: COMMERCIAL

## 2025-07-01 DIAGNOSIS — F43.22 ADJUSTMENT DISORDER WITH ANXIETY: Primary | ICD-10-CM

## 2025-07-01 PROCEDURE — 90834 PSYTX W PT 45 MINUTES: CPT | Performed by: MARRIAGE & FAMILY THERAPIST

## 2025-07-03 NOTE — PROGRESS NOTES
[facility]                                     Progress Note    Patient Name: Laya Zuleta  Date: 7/01/25           Service Type: Individual      Session Start Time: 12:02  Session End Time: 12:47     Session Length: 38 - 52 minutes    Session #: 53    Attendees: Client attended alone    Service Modality:  In Person     Treatment Plan Last Reviewed: Martha 3, 2025         DATA  Interactive Complexity: No  Crisis: No       Progress Since Last Session (Related to Symptoms / Goals / Homework):   Symptoms: No change .    Homework: Achieved / completed to satisfaction with strong self-care performance.      Episode of Care Goals: Satisfactory progress - MAINTENANCE (Working to maintain change, with risk of relapse); Intervened by continuing to positively reinforce healthy behavior choice      Current / Ongoing Stressors and Concerns:   Low self-esteem  Family boundaries issues   Daughter's medical and mental health diagnoses and development   Relationship conflict   Family of origin conflict     Treatment Objective(s) Addressed in This Session:   Client will learn and integrate DBT/CBT strategies to more effectively manage emotions and relationships.       Intervention:   Reviewed emotion regulation, distress tolerance, interpersonal effectiveness, mindfulness and self-care skills and strategies that have been useful to improve symptoms. Client reports she had difficulty managing the emotions and behaviors of her daughter during [place] She says at her daughter's current age of 18, they are considering not traveling with her on major vacation in the future. Love and Logic parenting skills reviewed and role-played. Processed emotions in session, validated, supportive counseling. Guidance offered to better utilize client's coping skills.    Assessments completed prior to visit:  The following assessments were completed by patient for this visit:   PHQ9:        10/6/2023     9:50 AM 1/19/2024    12:04 PM 4/25/2024     3:35 PM 8/1/2024    12:33 PM 11/7/2024    11:08 AM 2/21/2025     9:19 AM 6/5/2025     1:38 PM   PHQ-9 SCORE   PHQ-9 Total Score 0 0 0 0 0 0 0     GAD7:       10/6/2023     9:50 AM 1/19/2024    12:04 PM 4/25/2024     3:35 PM 8/1/2024    12:33 PM 11/7/2024    11:08 AM 2/21/2025     9:19 AM 6/5/2025     1:38 PM   ZAIRA-7 SCORE   Total Score 3 3 2 3 2 3 4     PROMIS 10-Global Health (only subscores and total score):       7/11/2023    11:29 AM 11/7/2024    11:08 AM 12/26/2024     9:08 AM 4/8/2025    11:57 AM   PROMIS-10 Scores Only   Global Mental Health Score 13    13 13 13  13    Global Physical Health Score 15    15 15 18  14    PROMIS TOTAL - SUBSCORES 28    28 28 31  27        Patient-reported     [place] Suicide Severity Rating Scale (Short Version)      7/2/2021     4:00 PM 6/8/2023     1:50 PM 10/6/2023     9:51 AM 1/19/2024    12:05 PM 8/1/2024    12:34 PM 11/7/2024    11:10 AM 6/5/2025     1:38 PM   [place] Suicide Severity Rating (Short Version)   Over the past 2 weeks have you felt down, depressed, or hopeless? no         Over the past 2 weeks have you had thoughts of killing yourself? no         Have you ever attempted to kill yourself? no         1. Wish to be Dead (Since Last Contact)  N N N N N N   2. Non-Specific Active Suicidal Thoughts (Since Last Contact)  N N N N N N   Actual Attempt (Since Last Contact)  N N N N N N   Has subject engaged in non-suicidal self-injurious behavior? (Since Last Contact)  N N N N N N   Interrupted Attempts (Since Last Contact)  N N N N N N   Aborted or Self-Interrupted Attempt (Since Last Contact)  N N N N N N   Preparatory Acts or Behavior (Since Last Contact)  N N N N N N   Suicide (Since Last Contact)  N N N N N N   Calculated C-SSRS Risk Score (Since Last Contact)  No Risk Indicated No Risk Indicated No Risk Indicated No Risk Indicated No Risk Indicated No Risk Indicated         ASSESSMENT: Current  Emotional / Mental Status (status of significant symptoms):   Risk status (Self / Other harm or suicidal ideation)   Patient denies current fears or concerns for personal safety.   Patient denies current or recent suicidal ideation or behaviors.   Patientdenies current or recent homicidal ideation or behaviors.   Patient denies current or recent self injurious behavior or ideation.   Patient denies other safety concerns.   Patient reports there has been no change in risk factors since their last session.     Patientreports there has been no change in protective factors since their last session.     Recommended that patient call 911 or go to the local ED should there be a change in any of these risk factors     Appearance:   Appropriate    Eye Contact:   Good    Psychomotor Behavior: Normal    Attitude:   Interested   Orientation:   All   Speech    Rate / Production: Normal     Volume:  Normal    Mood:    Agitated   Affect:    Appropriate    Thought Content:  Clear    Thought Form:  Coherent  Logical    Insight:    Good      Medication Review:   No changes to current psychiatric medication(s)     Medication Compliance:   Yes     Changes in Health Issues:   None reported     Chemical Use Review:   Substance Use: Chemical use reviewed, no active concerns identified      Tobacco Use: No current tobacco use.      Diagnosis:  Adjustment disorder with anxiety    Collateral Reports Completed:   Not Applicable    PLAN: (Patient Tasks / Therapist Tasks / Other)  Client will  use Love and Logic practices with her daughter.        Chung Martinez MA, LMFT                                                         ______________________________________________________________________                                                 Treatment Plan    Client's Name: Laya Zuleta  YOB: 1970    Date: Martha 3, 2025    DSM-V Diagnoses: 309.24 - Adjustment Disorder with Anxiety  ; V71.09 - No Diagnosis  Psychosocial  / Contextual Factors: daughter with bone disease  WHODAS: 13    Referral / Collaboration:  Referral to another professional/service is not indicated at this time..    Anticipated number of session or this episode of care: maintenance    Measurable Treatment Goal(s) related to diagnosis / functional impairment(s)   I will know I've met my goal when I learn tools to cope with and lower my anxiety.     Goal 1: Client will experience a significant reduction in ZAIRA-7 scores.     Objective #A   Client will learn and integrate DBT/CBT strategies to more effectively manage emotions and relationships.   Status: Continued: Martha 3, 2025  Intervention(s)   Therapist will teach emotional regulation, distress tolerance, interpersonal effectiveness, and mindfulness skills. Meditation resources will be offered. Therapist will teach TIPP skills: Temperature, Intense exercise, Paced breathing, and Paired Muscle Relaxation.    Objective #B   Client will identify cognitive distortions and negative self-talk contributing to depression and anxiety.   Status: Continued: Martha 3, 2025  Intervention(s)   Therapist will teach cognitive distortion identification and coping strategies.    Objective #C   Client will engage in positive self-care.  Status: Continued: Martha 3, 2025  Intervention(s)   Therapist will teach self-care goals:  Maintain balance in schedule (time for self/others, relaxation/activities, leisure/tasks, home/out of the house), assert needs and set limits with others, challenge negative thoughts/use affirming and encouraging self-talk, engage with support people on regular basis, practice behavior activation behaviors (sleep hygiene, balanced eating, physical activity, medical needs, personal hygiene), acknowledge and accept your feelings.    Objective #D  Client will learn and use Love and Logic parenting skills with their children.  Status: Continued: Martha 3, 2025  Intervention(s)  Therapist will teach Love and Logic  parenting skills.      Patient has reviewed and agreed to the above plan.    Rich Martinez MA, LMFT  Martha 3, 2025

## 2025-07-07 NOTE — PATIENT INSTRUCTIONS
At least 300mg gabapentin in the evening to see if pain goes away. Or 300mg three times a day for a few days to see it goes away.   Pls consider the following vaccines:   We recommend that everyone over the age of 50 get vaccination against shingles called Shingrix.  It is more effective than the original shingles vaccination.  We also recommend COVID booster.

## 2025-07-08 ENCOUNTER — OFFICE VISIT (OUTPATIENT)
Dept: PEDIATRICS | Facility: CLINIC | Age: 55
End: 2025-07-08
Payer: COMMERCIAL

## 2025-07-08 VITALS
TEMPERATURE: 97.6 F | OXYGEN SATURATION: 100 % | WEIGHT: 151.4 LBS | BODY MASS INDEX: 22.94 KG/M2 | SYSTOLIC BLOOD PRESSURE: 113 MMHG | HEIGHT: 68 IN | DIASTOLIC BLOOD PRESSURE: 75 MMHG | RESPIRATION RATE: 16 BRPM | HEART RATE: 75 BPM

## 2025-07-08 DIAGNOSIS — E66.3 OVERWEIGHT (BMI 25.0-29.9): ICD-10-CM

## 2025-07-08 DIAGNOSIS — E78.5 HYPERLIPIDEMIA LDL GOAL <100: Primary | ICD-10-CM

## 2025-07-08 PROCEDURE — G2211 COMPLEX E/M VISIT ADD ON: HCPCS | Performed by: NURSE PRACTITIONER

## 2025-07-08 PROCEDURE — 99214 OFFICE O/P EST MOD 30 MIN: CPT | Performed by: NURSE PRACTITIONER

## 2025-07-08 PROCEDURE — 3074F SYST BP LT 130 MM HG: CPT | Performed by: NURSE PRACTITIONER

## 2025-07-08 PROCEDURE — 3078F DIAST BP <80 MM HG: CPT | Performed by: NURSE PRACTITIONER

## 2025-07-08 PROCEDURE — 1126F AMNT PAIN NOTED NONE PRSNT: CPT | Performed by: NURSE PRACTITIONER

## 2025-07-08 RX ORDER — SEMAGLUTIDE 1.7 MG/.75ML
1.7 INJECTION, SOLUTION SUBCUTANEOUS WEEKLY
Qty: 12 ML | Refills: 1 | Status: SHIPPED | OUTPATIENT
Start: 2025-07-08

## 2025-07-08 ASSESSMENT — PAIN SCALES - GENERAL: PAINLEVEL_OUTOF10: NO PAIN (0)

## 2025-07-08 NOTE — PROGRESS NOTES
"  Assessment & Plan     Hyperlipidemia LDL goal <100  Pt with LDL near 190 despite lifestyle change. Pt without 1st degree relative with cardiac dz or stroke. For further risk stratification, pt would like to proceed with the following to determine if she needs a statin  - Lipoprotein (a); Future  - Apolipoprotein B; Future  - Lipid panel reflex to direct LDL Fasting; Future  - CT Coronary Calcium Scan; Future    Overweight (BMI 25.0-29.9)  Continue. Pt has added strength training and other exercise to her regimen and is focusing on protein intake  -Follow-up 6 months  - WEGOVY 1.7 MG/0.75ML pen; Inject 1.7 mg subcutaneously once a week.        Reyes Asif is a 55 year old, presenting for the following health issues:  Follow Up        7/8/2025    11:21 AM   Additional Questions   Roomed by Iris LOPEZ   Accompanied by Self         7/8/2025    11:21 AM   Patient Reported Additional Medications   Patient reports taking the following new medications NA     History of Present Illness       Reason for visit:  Medication follow up  Symptom onset:  Today  Symptoms include:  NA  : NA.  : NA.  : NA.  Prior treatment description:  NA  What makes it worse:  NA  What makes it better:  NA    She eats 2-3 servings of fruits and vegetables daily.She consumes 0 sweetened beverage(s) daily.She exercises with enough effort to increase her heart rate 10 to 19 minutes per day.  She exercises with enough effort to increase her heart rate 3 or less days per week.   She is taking medications regularly.                      Objective    /75 (BP Location: Right arm, Patient Position: Sitting, Cuff Size: Adult Regular)   Pulse 75   Temp 97.6  F (36.4  C) (Oral)   Resp 16   Ht 1.715 m (5' 7.5\")   Wt 68.7 kg (151 lb 6.4 oz)   LMP 09/15/2015 (LMP Unknown)   SpO2 100%   BMI 23.36 kg/m    Body mass index is 23.36 kg/m .  Physical Exam   CONSTITUTIONAL: Alert, well-nourished, well-groomed, NAD          Signed Electronically " by: FLORENCE JACOME CNP  The longitudinal plan of care for the diagnosis(es)/condition(s) as documented were addressed during this visit. Due to the added complexity in care, I will continue to support Laya in the subsequent management and with ongoing continuity of care.

## 2025-07-23 ENCOUNTER — LAB (OUTPATIENT)
Dept: LAB | Facility: CLINIC | Age: 55
End: 2025-07-23
Payer: COMMERCIAL

## 2025-07-23 DIAGNOSIS — E78.5 HYPERLIPIDEMIA LDL GOAL <100: ICD-10-CM

## 2025-07-23 LAB
APO A-I SERPL-MCNC: <6 MG/DL
CHOLEST SERPL-MCNC: 267 MG/DL
FASTING STATUS PATIENT QL REPORTED: YES
HDLC SERPL-MCNC: 54 MG/DL
LDLC SERPL CALC-MCNC: 183 MG/DL
NONHDLC SERPL-MCNC: 213 MG/DL
TRIGL SERPL-MCNC: 148 MG/DL

## 2025-07-23 PROCEDURE — 99000 SPECIMEN HANDLING OFFICE-LAB: CPT

## 2025-07-23 PROCEDURE — 36415 COLL VENOUS BLD VENIPUNCTURE: CPT

## 2025-07-23 PROCEDURE — 82172 ASSAY OF APOLIPOPROTEIN: CPT | Mod: 90

## 2025-07-23 PROCEDURE — 80061 LIPID PANEL: CPT

## 2025-07-23 PROCEDURE — 83695 ASSAY OF LIPOPROTEIN(A): CPT

## 2025-07-29 ENCOUNTER — RESULTS FOLLOW-UP (OUTPATIENT)
Dept: PEDIATRICS | Facility: CLINIC | Age: 55
End: 2025-07-29
Payer: COMMERCIAL

## 2025-07-29 DIAGNOSIS — E78.5 HYPERLIPIDEMIA LDL GOAL <100: Primary | ICD-10-CM

## 2025-07-30 DIAGNOSIS — B37.9 CANDIDA INFECTION: ICD-10-CM

## 2025-07-31 ENCOUNTER — MYC MEDICAL ADVICE (OUTPATIENT)
Dept: PEDIATRICS | Facility: CLINIC | Age: 55
End: 2025-07-31
Payer: COMMERCIAL

## 2025-07-31 RX ORDER — NYSTATIN 100000 U/G
CREAM TOPICAL
Qty: 60 G | Refills: 3 | Status: SHIPPED | OUTPATIENT
Start: 2025-07-31

## 2025-07-31 NOTE — TELEPHONE ENCOUNTER
Called and left voice message for patient to call [phone] and ask to speak to a nurse. Also sent "[employer].    Upon call back please:  -inquire about medication use/symptoms  -another e-visit or OV needed?    Per chart review  5/29/2024 e-visit with Suzanna Cantrell  -nystatin for yeast fungus under breast    nystatin (MYCOSTATIN) 113178 UNIT/GM external cream 60 g 3 5/29/2024 6/12/2024 No   Sig - Route: Apply topically 2 times daily for 14 days - Topical     Candida infection [B37.9]  - Primary        Awaiting call back at this time.    Cadence Suárez, RN

## 2025-07-31 NOTE — TELEPHONE ENCOUNTER
See refill request; patient has been on this medication prior and this is not new. RN suggested patient to disregard the e-visit message until we get update from provider.     Conchis CARVALHO RN on 7/31/2025 at 10:55 AM

## 2025-07-31 NOTE — TELEPHONE ENCOUNTER
RN called and spoke with patient. Per chart review, Suzanna prescribed this medication about 1 year ago for patient to use as needed. Patient uses as needed when she gets the rash under her breasts. She has been using this intermittently over the last year. Routing request for refill to PCP.     If patient needs to submit an e-visit please let us know.     Conchis Sears RN

## 2025-07-31 NOTE — TELEPHONE ENCOUNTER
Clinic RN: Please investigate patient's chart or contact patient if the information cannot be found because the medication is listed as historical or discontinued. Confirm patient is taking this medication. Document findings and route refill encounter to provider for approval or denial.    Delgado Herrera RN on 7/31/2025 at 8:40 AM

## 2025-08-07 ENCOUNTER — HOSPITAL ENCOUNTER (OUTPATIENT)
Dept: CT IMAGING | Facility: CLINIC | Age: 55
End: 2025-08-07
Attending: NURSE PRACTITIONER
Payer: COMMERCIAL

## 2025-08-07 DIAGNOSIS — E78.5 HYPERLIPIDEMIA LDL GOAL <100: ICD-10-CM

## 2025-08-07 LAB
CV CALCIUM SCORE AGATSTON LM: 0
CV CALCIUM SCORING AGATSON LAD: 17
CV CALCIUM SCORING AGATSTON CX: 0
CV CALCIUM SCORING AGATSTON RCA: 0
CV CALCIUM SCORING AGATSTON TOTAL: 17

## 2025-08-07 PROCEDURE — 75571 CT HRT W/O DYE W/CA TEST: CPT

## 2025-08-11 PROBLEM — E78.5 HYPERLIPIDEMIA LDL GOAL <100: Status: ACTIVE | Noted: 2025-08-11

## 2025-08-11 RX ORDER — ATORVASTATIN CALCIUM 20 MG/1
20 TABLET, FILM COATED ORAL DAILY
Qty: 90 TABLET | Refills: 3 | Status: SHIPPED | OUTPATIENT
Start: 2025-08-11

## (undated) DEVICE — KIT ENDO TURNOVER/PROCEDURE W/CLEAN A SCOPE LINERS 103888

## (undated) RX ORDER — FENTANYL CITRATE 50 UG/ML
INJECTION, SOLUTION INTRAMUSCULAR; INTRAVENOUS
Status: DISPENSED
Start: 2018-11-19